# Patient Record
Sex: MALE | Race: WHITE | NOT HISPANIC OR LATINO | Employment: OTHER | URBAN - METROPOLITAN AREA
[De-identification: names, ages, dates, MRNs, and addresses within clinical notes are randomized per-mention and may not be internally consistent; named-entity substitution may affect disease eponyms.]

---

## 2019-05-16 ENCOUNTER — OFFICE VISIT (OUTPATIENT)
Dept: PODIATRY | Facility: CLINIC | Age: 84
End: 2019-05-16
Payer: MEDICARE

## 2019-05-16 VITALS — WEIGHT: 275 LBS | BODY MASS INDEX: 33.49 KG/M2 | OXYGEN SATURATION: 94 % | HEIGHT: 76 IN | HEART RATE: 74 BPM

## 2019-05-16 DIAGNOSIS — B35.1 ONYCHOMYCOSIS DUE TO DERMATOPHYTE: ICD-10-CM

## 2019-05-16 DIAGNOSIS — E11.49 TYPE II DIABETES MELLITUS WITH NEUROLOGICAL MANIFESTATIONS: Primary | ICD-10-CM

## 2019-05-16 PROCEDURE — 1101F PR PT FALLS ASSESS DOC 0-1 FALLS W/OUT INJ PAST YR: ICD-10-PCS | Mod: ,,, | Performed by: PODIATRIST

## 2019-05-16 PROCEDURE — 99213 OFFICE O/P EST LOW 20 MIN: CPT | Mod: ,,, | Performed by: PODIATRIST

## 2019-05-16 PROCEDURE — 1101F PT FALLS ASSESS-DOCD LE1/YR: CPT | Mod: ,,, | Performed by: PODIATRIST

## 2019-05-16 PROCEDURE — 99213 PR OFFICE/OUTPT VISIT, EST, LEVL III, 20-29 MIN: ICD-10-PCS | Mod: ,,, | Performed by: PODIATRIST

## 2019-05-16 RX ORDER — BLOOD SUGAR DIAGNOSTIC
STRIP MISCELLANEOUS
COMMUNITY
Start: 2019-03-09

## 2019-05-16 RX ORDER — PIRFENIDONE 801 MG/1
1 TABLET, COATED ORAL 3 TIMES DAILY
COMMUNITY
Start: 2019-04-19 | End: 2021-12-30 | Stop reason: SDUPTHER

## 2019-05-16 RX ORDER — HUMAN INSULIN 100 [IU]/ML
30 INJECTION, SUSPENSION SUBCUTANEOUS 2 TIMES DAILY
Refills: 5 | COMMUNITY
Start: 2019-05-03

## 2019-05-16 RX ORDER — FUROSEMIDE 40 MG/1
40 TABLET ORAL EVERY OTHER DAY
COMMUNITY
Start: 2019-02-28

## 2019-05-16 RX ORDER — CLOTRIMAZOLE AND BETAMETHASONE DIPROPIONATE 10; .64 MG/G; MG/G
CREAM TOPICAL
Refills: 4 | COMMUNITY
Start: 2019-02-25 | End: 2021-08-09

## 2019-05-16 RX ORDER — LEVOTHYROXINE SODIUM 75 UG/1
TABLET ORAL
Refills: 3 | COMMUNITY
Start: 2019-04-12 | End: 2021-08-09

## 2019-05-16 RX ORDER — BICALUTAMIDE 50 MG/1
TABLET, FILM COATED ORAL
Refills: 3 | COMMUNITY
Start: 2019-02-24 | End: 2021-08-09 | Stop reason: CLARIF

## 2019-05-16 RX ORDER — APIXABAN 5 MG/1
5 TABLET, FILM COATED ORAL 2 TIMES DAILY
Status: ON HOLD | COMMUNITY
Start: 2019-05-14 | End: 2020-09-04 | Stop reason: SDUPTHER

## 2019-05-16 RX ORDER — ESCITALOPRAM OXALATE 10 MG/1
20 TABLET ORAL EVERY MORNING
COMMUNITY
Start: 2019-05-10 | End: 2021-08-09

## 2019-05-16 RX ORDER — METOPROLOL SUCCINATE 50 MG/1
TABLET, EXTENDED RELEASE ORAL
COMMUNITY
Start: 2019-02-28 | End: 2019-09-23

## 2019-05-16 NOTE — PROGRESS NOTES
[address] [facility] [address] [place] [address]  Phone: [phone]   [phone]    Patient's PCP:Primary Doctor No  Referring Provider: No ref. provider found    Subjective:      Chief Complaint:: Toe Pain (bilateral great toenails)    HPI  Manuel Casas is a 86 y.o. male who presents with a complaint of painful great toenails. Current symptoms include painful (when pt can feel it). Treatment to date have included periodic pedicures. Patients rates pain 3/10 on pain scale.    Systemic Doctor: Dr. Michaela Priest  Date Last Seen: 1 month ago  Blood Sugar: 98  Hemoglobin A1c: 6.6 (just about)    Vitals:    05/16/19 1621   Pulse: 74     Shoe Size: 11.5 D/E wide    Past Surgical History:   Procedure Laterality Date    ADENOIDECTOMY      CARDIAC PACEMAKER PLACEMENT      HERNIA REPAIR      left, umbilical    TONSILLECTOMY       Past Medical History:   Diagnosis Date    Cancer     prostate    Congestive heart failure     Diabetes mellitus, type 2     Heart failure     right sided    Hyperlipidemia     Pulmonary fibrosis      Family History   Problem Relation Age of Onset    Heart disease Mother     Diabetes Mother     Hypertension Mother         Social History:   Marital Status:   Alcohol History:  has no alcohol history on file.  Tobacco History:  reports that he has quit smoking. He does not have any smokeless tobacco history on file.  Drug History:  has no drug history on file.    Review of patient's allergies indicates:  No Known Allergies    Current Outpatient Medications   Medication Sig Dispense Refill    ACCU-CHEK SMARTVIEW TEST STRIP Strp       bicalutamide (CASODEX) 50 MG Tab TK 1 T PO QD  3    clotrimazole-betamethasone 1-0.05% (LOTRISONE) cream AIRAM EXT AA BID  4    ELIQUIS 5 mg Tab       ESBRIET 801 mg Tab       escitalopram oxalate (LEXAPRO) 10 MG tablet       furosemide (LASIX) 40 MG tablet       levothyroxine (SYNTHROID) 75 MCG tablet TK 1 T PO  D ON EMPTY STOMACH 1 H  APART FROM FOOD/MEDS  3    metoprolol succinate (TOPROL-XL) 50 MG 24 hr tablet       NOVOLIN N NPH U-100 INSULIN 100 unit/mL injection ADM 20 UNI SC BID  5     No current facility-administered medications for this visit.        Review of Systems      Objective:        Physical Exam:   Foot Exam    General  General Appearance: appears stated age and healthy   Orientation: alert and oriented to person, place, and time   Affect: appropriate   Assistance: cane use       Right Foot/Ankle     Inspection and Palpation  Ecchymosis: none  Tenderness: none   Swelling: (Moderate edema to the lower extremity)  Skin Exam: dry skin; no ulcer and no erythema     Neurovascular  Dorsalis pedis: 1+  Posterior tibial: 1+  Saphenous nerve sensation: diminished  Tibial nerve sensation: diminished  Superficial peroneal nerve sensation: diminished  Deep peroneal nerve sensation: diminished  Sural nerve sensation: diminished    Muscle Strength  Ankle dorsiflexion: 5  Ankle plantar flexion: 5  Ankle inversion: 5  Ankle eversion: 5  Great toe extension: 5  Great toe flexion: 5    Comments  Nails 1 through 5 are thickened, discolored, dystrophic, and elongated with subungual debris      There is slight incurvation of the lateral border of the great toenail. No edema. No erythema. No tenderness to palpation. No purulence.    Left Foot/Ankle      Inspection and Palpation  Ecchymosis: none  Tenderness: none   Swelling: (Moderate edema to the lower extremity)  Skin Exam: dry skin; no ulcer and no erythema     Neurovascular  Dorsalis pedis: 1+  Posterior tibial: absent  Saphenous nerve sensation: diminished  Tibial nerve sensation: diminished  Superficial peroneal nerve sensation: diminished  Deep peroneal nerve sensation: diminished  Sural nerve sensation: diminished    Muscle Strength  Ankle dorsiflexion: 5  Ankle plantar flexion: 5  Ankle inversion: 5  Ankle eversion: 5  Great toe extension: 5  Great toe flexion: 5    Comments  Nails 1  through 5 are thickened, discolored, dystrophic, and elongated with subungual debris      Physical Exam   Cardiovascular:   Pulses:       Dorsalis pedis pulses are 1+ on the right side, and 1+ on the left side.        Posterior tibial pulses are 1+ on the right side, and Absent on the left side.   Feet:   Right Foot:   Skin Integrity: Positive for dry skin. Negative for ulcer or erythema.   Left Foot:   Skin Integrity: Positive for dry skin. Negative for ulcer or erythema.       Imaging: none            Assessment:       1. Type II diabetes mellitus with neurological manifestations    2. Onychomycosis due to dermatophyte      Plan:   Type II diabetes mellitus with neurological manifestations    Onychomycosis due to dermatophyte      Follow up if symptoms worsen or fail to improve.    Procedures - None    Recommend patient continue to get his regular foot care. Explained that if pain returns or if any signs of infection are present to return to clinic for further treatment.    Counseling:     I provided patient education verbally regarding:   Patient diagnosis, treatment options, as well as alternatives, risks, and benefits.     Counseled patient on the aspects of diabetes and how it pertains to the feet.  I explained the importance of proper diabetic foot care and how it is essential for the health of their feet.      Shoe inspection. Patient instructed on proper foot hygeine. We discussed wearing proper shoe gear, daily foot inspections, never walking without protective shoe gear, never putting sharp instruments to feet, routine podiatric nail visits every 2-3 months.      Fungal infection of toenails explained. Treatment options including no treatment, periodic debridement, topical medications, oral medications, and removal of the nail were discussed, as well as success rates and risks of recurrence. We agreed on periodic debridement     This note was created using Dragon voice recognition software that  occasionally misinterpreted phrases or words.

## 2019-05-16 NOTE — PATIENT INSTRUCTIONS
Diabetic Foot Care  Diabetes can lead to a number of foot complications. Fortunately, you can prevent most of these with a little extra foot care. If diabetes is not well controlled, it can cause damage to blood vessels and result in poor circulation to the foot. When the skin does not get enough blood flow, it becomes prone to pressure sores and ulcers, which heal slowly.  Diabetes can also damage nerves, interfering with the ability to feel pain and pressure. When you cant feel your foot normally, it is easy to injure your skin, bones, and joints without knowing it. For these reasons diabetes increases the risk of fungal infections, bunions, and ulcers. An ulcer is a sore or break in the skin. With ulcers, often the skin seems to have worn away. Deep ulcers can lead to bone infection.  Gangrene is the most serious foot complication of diabetes. It usually occurs on the tips of the toes as blackened areas of skin. The black area is dead tissue. In severe cases, gangrene spreads to involve the entire toe, other toes, and the entire foot. Foot or toe amputation may be required. Good foot care and blood sugar control can prevent this.  Home care  · Wear comfortable, well-fitting shoes.  · Wash your feet daily with warm water and mild soap.  · After drying, apply a moisturizing cream or lotion to the top and bottom of your feet. Don't put lotion between toes.  · Check your feet daily for skin breaks, blisters, swelling, or redness. Look between your toes as well. If you cannot see the bottoms of your feet, ask someone to look or use a mirror.  · Wear cotton socks and change them every day.  · Trim toenails carefully, and do not cut your cuticles.  · Strive to keep your blood sugar under control with a combination of medicines, diet, and activity.  · If you smoke and have diabetes, it is very important that you stop. Smoking reduces blood flow to your foot.  · Schedule foot exams at least every year, or more often if  you have foot problems.  · Put your feet up when sitting, wiggle toes, and move ankles to help improve blood flow.  Avoid activities that increase your risk of foot injury:  · Do not walk barefoot.  · Do not use heating pads or hot water bottles on your feet.  · Do not put your foot in a hot tub without first checking the temperature with your hand.  Follow-up care  Follow up with your healthcare provider, or as advised. Be sure to take off your shoes and socks before your appointment starts so your healthcare provider will be sure to check your feet. Report any cut, puncture, scrape, blister, or other injury to your foot. Also report if you have a bunion, hammertoes, ingrown toenail, or ulcer on your foot.  When to seek medical advice  Call your healthcare provider right away if any of these occur:  · Black skin color anywhere on the foot  · Open ulcer with pus draining from the wound  · Increasing foot or leg pain  · New areas of redness or swelling or tender areas of the foot  · Fever of 100.4°F (38°C) or greater  Date Last Reviewed: 5/25/2016  © 6209-2724 [employer]. [address] [place] [address]. All rights reserved. This information is not intended as a substitute for professional medical care. Always follow your healthcare professional's instructions.          Nail Fungal Infection  A nail fungal infection changes the way fingernails and toenails look. They may thicken, discolor, change shape, or split. This condition is hard to treat because nails grow slowly and have limited blood supply. The infection often comes back after treatment.  There are 2 types of medicines used to treat this condition:  · Topical anti-fungal medicines. These are applied to the surface of the skin and nail area. These medicines are not very effective because they cant get deep into the nail.  · Oral antifungal medicines. These medicines work better because they go into the nail from the inside out. But  the infection may still come back. It may take 9 to 12 months for your nail to look normal again. This means you are cured. You can repeat treatment if needed. Most people take these medicines without any problems. It is rare to stop therapy because of side effects. But your healthcare provider may give you some monitoring tests. Talk about possible side effects with your provider before starting treatment.  If medicines fail, the nail can be removed surgically or chemically. These methods physically remove the fungus from the body. This helps medical treatment be more effective.  Home care  · Use medicines exactly as directed for as long as directed. Treating a fungal infection can take longer than other kinds of infections.  · Smoking is a risk factor for fungal infection. This is one more reason to quit.  · Wear absorbent socks, and shoes that let your feet breathe. Sweaty feet increase your risk of fungal infection. They also make an existing infection harder to treat.  · Use footwear when in damp public places like swimming pools, gyms, and shower rooms. This will help you avoid the fungus that grows there.  · Don't share nail clippers or scissors with others.  Follow-up care  Follow up with your healthcare provider, or as advised.  When to seek medical advice  Call your healthcare provider right away if any of these occur:  · Skin by the nail becomes red, swollen, painful, or drains pus (a creamy yellow or white liquid)  · Side effects from oral anti-fungal medicines  Date Last Reviewed: 8/1/2016  © 0844-2172 [employer]. [address] [place] [address], [place] [address]. All rights reserved. This information is not intended as a substitute for professional medical care. Always follow your healthcare professional's instructions.          Treating Peripheral Neuropathy  Peripheral neuropathy is a disease of the nerves. It most often starts in your feet and may also eventually affect the arms. It may cause  pain or may make you unable to sense pain. Sometimes, weakness occurs as well. Lack of pain and weakness makes you more likely to injure yourself without knowing it.  Learn ways to protect your feet. Check your feet daily for wounds you may not have felt. Avoid burns by testing bath water with your elbow before stepping in. Also, always wear shoes to prevent injury.    Regular foot care  If you have foot numbness, you may not notice cutting yourself while trimming your nails. To prevent problems, your doctor may ask you to visit for nail and callus trimming. See your doctor for foot care as often as suggested.  Check your feet daily  Catch problems early by checking your feet every day for changes. Look at the top and bottom of your feet, your heels, and between your toes. It may help to use a mirror. If this is hard, ask someone to check for you. Call your doctor if you notice a wound, ulceration, ingrown nail, or any changes in your feet. This includes increased heat, swelling, and redness.  Wear proper footwear  Always wear shoes and socks, even indoors. Ask your doctor how to choose the right shoe. After buying shoes, bring them to your doctor to be checked for fit. Take new shoes off every hour or so to check for red pressure areas on your feet. Each time you put on your shoes, use your fingers first to feel inside for foreign objects.  Common causes of peripheral neuropathy  Some common causes of peripheral neuropathy include:  · Diabetes or other endocrine disorders  · Toxins (such as alcohol)  · Nutritional deficiencies (such as Vitamin B-12)  · Kidney disease  · Injury  · Repetitive stress (such as carpal tunnel syndrome)  · Autoimmune disease  · Cancer and tumors  · Infection  Diagnosis and treatment  Diagnosis of peripheral neuropathy includes a complete history and physical exam.  Lab tests including blood work and imaging often help determine the cause.  Special nerve tests are often helpful including  nerve conduction velocity studies (NCV), and electromyelography (EMG).  Treatment focuses on treating the underlying disorder and treating symptoms through the use of medicines, injections, TENS (transcutaneous electrical nerve stimulation), acupuncture, massage, and other methods.  Date Last Reviewed: 10/19/2015  © 1932-0609 [employer]. [address] [place] [address], [place] [address]. All rights reserved. This information is not intended as a substitute for professional medical care. Always follow your healthcare professional's instructions.

## 2019-09-23 ENCOUNTER — OFFICE VISIT (OUTPATIENT)
Dept: PODIATRY | Facility: CLINIC | Age: 84
End: 2019-09-23
Payer: MEDICARE

## 2019-09-23 VITALS
WEIGHT: 275 LBS | DIASTOLIC BLOOD PRESSURE: 65 MMHG | HEART RATE: 70 BPM | SYSTOLIC BLOOD PRESSURE: 109 MMHG | HEIGHT: 76 IN | BODY MASS INDEX: 33.49 KG/M2

## 2019-09-23 DIAGNOSIS — B35.1 ONYCHOMYCOSIS DUE TO DERMATOPHYTE: ICD-10-CM

## 2019-09-23 DIAGNOSIS — E11.49 TYPE II DIABETES MELLITUS WITH NEUROLOGICAL MANIFESTATIONS: Primary | ICD-10-CM

## 2019-09-23 PROCEDURE — 99999 PR PBB SHADOW E&M-EST. PATIENT-LVL III: CPT | Mod: PBBFAC,,, | Performed by: PODIATRIST

## 2019-09-23 PROCEDURE — 99999 PR PBB SHADOW E&M-EST. PATIENT-LVL III: ICD-10-PCS | Mod: PBBFAC,,, | Performed by: PODIATRIST

## 2019-09-23 PROCEDURE — 1101F PT FALLS ASSESS-DOCD LE1/YR: CPT | Mod: S$GLB,,, | Performed by: PODIATRIST

## 2019-09-23 PROCEDURE — 99213 OFFICE O/P EST LOW 20 MIN: CPT | Mod: S$GLB,,, | Performed by: PODIATRIST

## 2019-09-23 PROCEDURE — 99213 PR OFFICE/OUTPT VISIT, EST, LEVL III, 20-29 MIN: ICD-10-PCS | Mod: S$GLB,,, | Performed by: PODIATRIST

## 2019-09-23 PROCEDURE — 1101F PR PT FALLS ASSESS DOC 0-1 FALLS W/OUT INJ PAST YR: ICD-10-PCS | Mod: S$GLB,,, | Performed by: PODIATRIST

## 2019-09-23 RX ORDER — PREGABALIN 150 MG/1
CAPSULE ORAL NIGHTLY
COMMUNITY
Start: 2019-05-03 | End: 2021-08-09

## 2019-09-23 RX ORDER — SYRINGE AND NEEDLE,INSULIN,1ML 29 G X1/2"
SYRINGE, EMPTY DISPOSABLE MISCELLANEOUS
COMMUNITY
Start: 2019-09-13

## 2019-09-23 RX ORDER — POTASSIUM CHLORIDE 750 MG/1
10 TABLET, EXTENDED RELEASE ORAL EVERY OTHER DAY
Refills: 11 | COMMUNITY
Start: 2019-07-19

## 2019-09-23 RX ORDER — ALBUTEROL SULFATE 90 UG/1
1-2 AEROSOL, METERED RESPIRATORY (INHALATION)
Refills: 0 | Status: ON HOLD | COMMUNITY
Start: 2019-07-09 | End: 2022-01-26 | Stop reason: CLARIF

## 2019-09-23 NOTE — PROGRESS NOTES
[address] [facility] [address]  [place] [address]  Phone: [phone]   [phone]    Patient's PCP:Primary Doctor No  Referring Provider: No ref. provider found    Subjective:      Chief Complaint:: Ingrown Toenail (left 1st medial border)    HPI  Manuel Casas is a 86 y.o. male who presents with a complaint of possibly ingrown left first medial border lasting for a couple of weeks intermittently and dry skin. Current symptoms include painful when pressure applied.  Aggravating factors are pressure applied. Treatment to date have included topicals for dry skin.     Systemic Doctor: Dr. Michaela Priest  Date Last Seen: about 6 months  Blood Sugar: 92  Hemoglobin A1c: unknown    Vitals:    09/23/19 1147   BP: 109/65   Pulse: 70     Shoe Size: 11.5 wide    Past Surgical History:   Procedure Laterality Date    ADENOIDECTOMY      CARDIAC PACEMAKER PLACEMENT      HERNIA REPAIR      left, umbilical    TONSILLECTOMY       Past Medical History:   Diagnosis Date    Cancer     prostate    Congestive heart failure     Diabetes mellitus, type 2     Heart failure     right sided    Hyperlipidemia     Pulmonary fibrosis      Family History   Problem Relation Age of Onset    Heart disease Mother     Diabetes Mother     Hypertension Mother         Social History:   Marital Status:   Alcohol History:  has no alcohol history on file.  Tobacco History:  reports that he has quit smoking. He does not have any smokeless tobacco history on file.  Drug History:  has no drug history on file.    Review of patient's allergies indicates:  No Known Allergies    Current Outpatient Medications   Medication Sig Dispense Refill    ACCU-CHEK SMARTVIEW TEST STRIP Strp       bicalutamide (CASODEX) 50 MG Tab TK 1 T PO QD  3    clotrimazole-betamethasone 1-0.05% (LOTRISONE) cream AIRAM EXT AA BID  4    ELIQUIS 5 mg Tab 5 mg 2 (two) times daily.       ESBRIET 801 mg Tab 3 (three) times daily.       escitalopram oxalate  "(LEXAPRO) 10 MG tablet 20 mg every morning.       furosemide (LASIX) 40 MG tablet 40 mg every other day.       levothyroxine (SYNTHROID) 75 MCG tablet TK 1 T PO  D ON EMPTY STOMACH 1 H APART FROM FOOD/MEDS  3    NOVOLIN N NPH U-100 INSULIN 100 unit/mL injection 45 Units 2 (two) times daily.   5    potassium chloride (KLOR-CON) 10 MEQ TbSR every other day.   11    pregabalin (LYRICA) 150 MG capsule Take by mouth every evening.       ULTRA COMFORT INSULIN SYRINGE 1 mL 29 gauge x 1/2" Syrg       VENTOLIN HFA 90 mcg/actuation inhaler INL 1 TO 2 PFS PO Q 4 TO 6 H PRN  0     No current facility-administered medications for this visit.        Review of Systems      Objective:        Physical Exam:   Foot Exam    General  General Appearance: appears stated age and healthy   Orientation: alert and oriented to person, place, and time   Affect: appropriate   Assistance: cane use       Right Foot/Ankle     Inspection and Palpation  Ecchymosis: none  Tenderness: none   Swelling: (Moderate edema to the lower extremity)  Skin Exam: dry skin; no ulcer and no erythema     Neurovascular  Dorsalis pedis: 1+  Posterior tibial: 1+  Saphenous nerve sensation: diminished  Tibial nerve sensation: diminished  Superficial peroneal nerve sensation: diminished  Deep peroneal nerve sensation: diminished  Sural nerve sensation: diminished    Muscle Strength  Ankle dorsiflexion: 5  Ankle plantar flexion: 5  Ankle inversion: 5  Ankle eversion: 5  Great toe extension: 5  Great toe flexion: 5    Comments  Nails 1 through 5 are thickened, discolored, dystrophic, and elongated with subungual debris          Left Foot/Ankle      Inspection and Palpation  Ecchymosis: none  Tenderness: none   Swelling: (Moderate edema to the lower extremity)  Skin Exam: dry skin; no ulcer and no erythema     Neurovascular  Dorsalis pedis: 1+  Posterior tibial: absent  Saphenous nerve sensation: diminished  Tibial nerve sensation: diminished  Superficial peroneal " nerve sensation: diminished  Deep peroneal nerve sensation: diminished  Sural nerve sensation: diminished    Muscle Strength  Ankle dorsiflexion: 5  Ankle plantar flexion: 5  Ankle inversion: 5  Ankle eversion: 5  Great toe extension: 5  Great toe flexion: 5    Comments  Nails 1 through 5 are thickened, discolored, dystrophic, and elongated with subungual debris    There is an excess accumulation of subungual debris on the medial aspect of the great toenail.  No signs of ingrowing or infection.    Physical Exam   Cardiovascular:   Pulses:       Dorsalis pedis pulses are 1+ on the right side, and 1+ on the left side.        Posterior tibial pulses are 1+ on the right side, and Absent on the left side.   Feet:   Right Foot:   Skin Integrity: Positive for dry skin. Negative for ulcer or erythema.   Left Foot:   Skin Integrity: Positive for dry skin. Negative for ulcer or erythema.       Imaging: none            Assessment:       1. Type II diabetes mellitus with neurological manifestations    2. Onychomycosis due to dermatophyte      Plan:   Type II diabetes mellitus with neurological manifestations    Onychomycosis due to dermatophyte      Follow up if symptoms worsen or fail to improve.    Procedures - None    Recommend patient continue to get his regular foot care. Explained that if pain returns or if any signs of infection are present to return to clinic for further treatment.    Counseling:     I provided patient education verbally regarding:   Patient diagnosis, treatment options, as well as alternatives, risks, and benefits.     This note was created using Dragon voice recognition software that occasionally misinterpreted phrases or words.

## 2020-08-24 ENCOUNTER — OFFICE VISIT (OUTPATIENT)
Dept: PULMONOLOGY | Facility: CLINIC | Age: 85
End: 2020-08-24
Payer: MEDICARE

## 2020-08-24 DIAGNOSIS — G47.33 OBSTRUCTIVE SLEEP APNEA SYNDROME: ICD-10-CM

## 2020-08-24 DIAGNOSIS — E66.9 OBESITY WITH BODY MASS INDEX 30 OR GREATER: ICD-10-CM

## 2020-08-24 DIAGNOSIS — I48.92 ATRIAL FLUTTER, UNSPECIFIED TYPE: ICD-10-CM

## 2020-08-24 DIAGNOSIS — I50.9 CONGESTIVE HEART FAILURE, UNSPECIFIED HF CHRONICITY, UNSPECIFIED HEART FAILURE TYPE: ICD-10-CM

## 2020-08-24 DIAGNOSIS — R09.02 HYPOXEMIA: ICD-10-CM

## 2020-08-24 DIAGNOSIS — J84.10 FIBROSIS OF LUNG: ICD-10-CM

## 2020-08-24 DIAGNOSIS — Z95.0 PRESENCE OF CARDIAC PACEMAKER: ICD-10-CM

## 2020-08-24 PROBLEM — G47.30 SLEEP APNEA: Status: ACTIVE | Noted: 2020-08-24

## 2020-08-24 PROCEDURE — 99204 OFFICE O/P NEW MOD 45 MIN: CPT | Mod: S$GLB,,, | Performed by: INTERNAL MEDICINE

## 2020-08-24 PROCEDURE — 1159F MED LIST DOCD IN RCRD: CPT | Mod: S$GLB,,, | Performed by: INTERNAL MEDICINE

## 2020-08-24 PROCEDURE — 1159F PR MEDICATION LIST DOCUMENTED IN MEDICAL RECORD: ICD-10-PCS | Mod: S$GLB,,, | Performed by: INTERNAL MEDICINE

## 2020-08-24 PROCEDURE — 99204 PR OFFICE/OUTPT VISIT, NEW, LEVL IV, 45-59 MIN: ICD-10-PCS | Mod: S$GLB,,, | Performed by: INTERNAL MEDICINE

## 2020-08-24 RX ORDER — CELECOXIB 200 MG/1
200 CAPSULE ORAL
COMMUNITY
End: 2021-08-09

## 2020-08-24 RX ORDER — MONTELUKAST SODIUM 10 MG/1
10 TABLET ORAL NIGHTLY
COMMUNITY
End: 2021-08-09

## 2020-08-24 RX ORDER — FAMOTIDINE 20 MG/1
20 TABLET, FILM COATED ORAL 2 TIMES DAILY
Status: ON HOLD | COMMUNITY
End: 2022-01-26 | Stop reason: CLARIF

## 2020-08-24 RX ORDER — PREDNISONE 10 MG/1
TABLET ORAL
Qty: 30 TABLET | Refills: 0 | Status: ON HOLD | OUTPATIENT
Start: 2020-08-24 | End: 2020-09-04 | Stop reason: SDUPTHER

## 2020-08-24 NOTE — ASSESSMENT & PLAN NOTE
· Has ? Recent infection (reviewed CXR but no old films for comparison)  · Still with some increased dyspnea - steroid taper and will see how he does  · RTC 2-3 weeks for recheck  · Continue with ESBRIET  · May need to get repeat studies in 11/2020

## 2020-08-24 NOTE — PROGRESS NOTES
New Office Visit/Consultation Note *    Patient Name: Manuel Casas  MRN: [id number]  : 3/23/1933      Reason for visit: To transfer care, IPF/UIP, sleep apnea, chronic hypoxemia    HPI:     2020 - Pt with h/o IPF/UIP - diagnosed about 3-4 years ago initially on OFEV (had GI side effects), changed to ESBRIET and has no issues with it.  He has been stable (Dr Wade's last note has been reviewed PFT - - TLC -73%, DLCO 80%).   About 2 weeks ago he was sick with respiratory issues - was seen at Urgent Care and was treated with zithromax and feels better now.  He does feel that since stopping zithromax about 1 week ago he has increased SOB, no increased congestion but has had some increased cough.    Past Medical History    Past Medical History:   Diagnosis Date    Atrial flutter     Cancer     prostate    Congestive heart failure     Diabetes mellitus, type 2     Heart failure     right sided    Hyperlipidemia     Pulmonary fibrosis     Sleep apnea        Past Surgical History    Past Surgical History:   Procedure Laterality Date    ADENOIDECTOMY      CARDIAC PACEMAKER PLACEMENT      HERNIA REPAIR      left, umbilical    TONSILLECTOMY         Medications      Current Outpatient Medications:     ACCU-CHEK SMARTVIEW TEST STRIP Strp, , Disp: , Rfl:     bicalutamide (CASODEX) 50 MG Tab, TK 1 T PO QD, Disp: , Rfl: 3    celecoxib (CELEBREX) 200 MG capsule, Take 200 mg by mouth., Disp: , Rfl:     clotrimazole-betamethasone 1-0.05% (LOTRISONE) cream, AIRAM EXT AA BID, Disp: , Rfl: 4    ELIQUIS 5 mg Tab, 5 mg 2 (two) times daily. , Disp: , Rfl:     ESBRIET 801 mg Tab, 3 (three) times daily. , Disp: , Rfl:     escitalopram oxalate (LEXAPRO) 10 MG tablet, 20 mg every morning. , Disp: , Rfl:     famotidine (PEPCID) 20 MG tablet, Take 20 mg by mouth 2 (two) times daily., Disp: , Rfl:     furosemide (LASIX) 40 MG tablet, 40 mg every other day. , Disp: , Rfl:     levothyroxine (SYNTHROID) 75 MCG  "tablet, TK 1 T PO  D ON EMPTY STOMACH 1 H APART FROM FOOD/MEDS, Disp: , Rfl: 3    montelukast (SINGULAIR) 10 mg tablet, Take 10 mg by mouth every evening., Disp: , Rfl:     NOVOLIN N NPH U-100 INSULIN 100 unit/mL injection, 45 Units 2 (two) times daily. , Disp: , Rfl: 5    potassium chloride (KLOR-CON) 10 MEQ TbSR, every other day. , Disp: , Rfl: 11    pregabalin (LYRICA) 150 MG capsule, Take by mouth every evening. , Disp: , Rfl:     ULTRA COMFORT INSULIN SYRINGE 1 mL 29 gauge x 1/2" Syrg, , Disp: , Rfl:     predniSONE (DELTASONE) 10 MG tablet, Take 3 a day x 5 days then 2 a day x 5 days then 1 a day x 5 days, Disp: 30 tablet, Rfl: 0    VENTOLIN HFA 90 mcg/actuation inhaler, INL 1 TO 2 PFS PO Q 4 TO 6 H PRN, Disp: , Rfl: 0    Allergies    Review of patient's allergies indicates:  No Known Allergies    SocHx    Social History     Tobacco Use   Smoking Status Former Smoker   about 30 pack years, quit about 50 years ago    Social History     Substance and Sexual Activity   Alcohol Use None       Drug Use - no  Occupation - retired urologist  Asbestos exposure - no  Pets - cat    FMHx    Family History   Problem Relation Age of Onset    Heart disease Mother     Diabetes Mother     Hypertension Mother          Review of Systems  Review of Systems   Constitutional: Negative for chills, diaphoresis, fever, malaise/fatigue and weight loss.   HENT: Negative for congestion.    Eyes: Negative for pain.   Respiratory: Positive for cough and shortness of breath. Negative for hemoptysis, sputum production, wheezing and stridor.    Cardiovascular: Negative for chest pain, palpitations, orthopnea, claudication, leg swelling and PND.   Gastrointestinal: Negative for abdominal pain, blood in stool, constipation, diarrhea, heartburn, nausea and vomiting.   Genitourinary: Negative for dysuria, frequency, hematuria and urgency.   Musculoskeletal: Negative for back pain, falls, joint pain, myalgias and neck pain.   Skin: " Negative for itching and rash.   Neurological: Negative for dizziness, tingling, tremors, sensory change, speech change, focal weakness, seizures, loss of consciousness, weakness and headaches.   Endo/Heme/Allergies: Negative for environmental allergies.   Psychiatric/Behavioral: Negative for depression, substance abuse and suicidal ideas. The patient is not nervous/anxious.        Physical Exam    Vitals:    08/24/20 0942   BP: (P) 132/76   Pulse: (P) 73   Temp: (P) 97 °F (36.1 °C)   SpO2: (!) (P) 89%   Weight: (P) 130.2 kg (287 lb)       Physical Exam  Vitals signs and nursing note reviewed.   Constitutional:       General: He is not in acute distress.     Appearance: He is well-developed. He is obese. He is not ill-appearing, toxic-appearing or diaphoretic.      Comments: Wearing O2 and mask   HENT:      Head: Normocephalic and atraumatic.      Right Ear: External ear normal.      Left Ear: External ear normal.      Nose: Nose normal.   Eyes:      General: No scleral icterus.        Right eye: No discharge.         Left eye: No discharge.      Extraocular Movements: Extraocular movements intact.      Conjunctiva/sclera: Conjunctivae normal.      Pupils: Pupils are equal, round, and reactive to light.   Neck:      Musculoskeletal: Normal range of motion and neck supple. No neck rigidity or muscular tenderness.      Thyroid: No thyromegaly.      Vascular: No carotid bruit or JVD.      Trachea: No tracheal deviation.   Cardiovascular:      Rate and Rhythm: Normal rate and regular rhythm.      Pulses: Normal pulses.      Heart sounds: Normal heart sounds. No murmur. No friction rub. No gallop.    Pulmonary:      Effort: Pulmonary effort is normal. No respiratory distress.      Breath sounds: No stridor. Rales present. No wheezing or rhonchi.   Chest:      Chest wall: No tenderness.   Abdominal:      General: Bowel sounds are normal. There is no distension.      Palpations: Abdomen is soft.      Tenderness: There is  no abdominal tenderness. There is no guarding.      Comments: obese   Musculoskeletal: Normal range of motion.         General: No swelling, tenderness or deformity.      Right lower leg: Edema (trace) present.      Left lower leg: Edema (trace) present.   Lymphadenopathy:      Cervical: No cervical adenopathy.   Skin:     General: Skin is warm and dry.      Coloration: Skin is not jaundiced.      Findings: No bruising.   Neurological:      General: No focal deficit present.      Mental Status: He is alert and oriented to person, place, and time. Mental status is at baseline.      Cranial Nerves: No cranial nerve deficit.      Deep Tendon Reflexes: Reflexes are normal and symmetric.      Comments: In wheelchair   Psychiatric:         Mood and Affect: Mood normal.         Behavior: Behavior normal.         Thought Content: Thought content normal.         Judgment: Judgment normal.         Labs    No results found for: WBC, HGB, HCT, PLT    No results found for: NA, K, CL, CO2, GLU, BUN, CREATININE, CALCIUM, PROT, ALBUMIN, BILITOT, ALKPHOS, AST, ALT, ANIONGAP    Xrays        Impression/Plan    Problem List Items Addressed This Visit        Pulmonary    Fibrosis of lung     · Has ? Recent infection (reviewed CXR but no old films for comparison)  · Still with some increased dyspnea - steroid taper and will see how he does  · RTC 2-3 weeks for recheck  · Continue with ESBRIET  · May need to get repeat studies in 11/2020           Hypoxemia     · Continue present O2 and reassess as needed            Cardiac/Vascular    Atrial flutter     · By history  · Rate controlled at current visit         Congestive heart failure     · Seems stable  · May need old ECHO reports to evaluate         Presence of cardiac pacemaker     · Aware             Endocrine    Obesity with body mass index 30 or greater     · Could benefit from exercise and weight loss            Other    Sleep apnea     · Has CPAP at home and reports good  compliance               I have spent about 45 minutes with the patient taking the history and examining the patient.  We have discussed the diagnoses and current plan and all questions have been answered.  We have discussed the follow up plan.  The patient and family (if present) know to contact the office with any questions they may have.        Timo Ambrocio MD

## 2020-08-26 ENCOUNTER — HOSPITAL ENCOUNTER (INPATIENT)
Facility: HOSPITAL | Age: 85
LOS: 8 days | Discharge: HOME HEALTH | DRG: 871 | End: 2020-09-04
Attending: EMERGENCY MEDICINE | Admitting: FAMILY MEDICINE
Payer: MEDICARE

## 2020-08-26 DIAGNOSIS — J18.9 PNEUMONIA OF RIGHT LOWER LOBE DUE TO INFECTIOUS ORGANISM: ICD-10-CM

## 2020-08-26 DIAGNOSIS — Z87.09 HISTORY OF PULMONARY FIBROSIS: ICD-10-CM

## 2020-08-26 DIAGNOSIS — E66.9 OBESITY WITH BODY MASS INDEX 30 OR GREATER: ICD-10-CM

## 2020-08-26 DIAGNOSIS — A41.9 SEPSIS, DUE TO UNSPECIFIED ORGANISM, UNSPECIFIED WHETHER ACUTE ORGAN DYSFUNCTION PRESENT: Primary | ICD-10-CM

## 2020-08-26 DIAGNOSIS — J96.21 ACUTE ON CHRONIC RESPIRATORY FAILURE WITH HYPOXIA: ICD-10-CM

## 2020-08-26 DIAGNOSIS — R06.02 SOB (SHORTNESS OF BREATH): ICD-10-CM

## 2020-08-26 DIAGNOSIS — J84.10 PULMONARY FIBROSIS: ICD-10-CM

## 2020-08-26 DIAGNOSIS — Z20.822 SUSPECTED COVID-19 VIRUS INFECTION: ICD-10-CM

## 2020-08-26 DIAGNOSIS — J18.9 PNEUMONIA: ICD-10-CM

## 2020-08-26 DIAGNOSIS — R04.2 HEMOPTYSIS: ICD-10-CM

## 2020-08-26 DIAGNOSIS — R09.02 HYPOXEMIA: ICD-10-CM

## 2020-08-26 DIAGNOSIS — A41.9 SEPSIS: ICD-10-CM

## 2020-08-26 DIAGNOSIS — D69.6 THROMBOCYTOPENIA: ICD-10-CM

## 2020-08-26 DIAGNOSIS — I48.92 ATRIAL FLUTTER, UNSPECIFIED TYPE: ICD-10-CM

## 2020-08-26 DIAGNOSIS — J96.01 ACUTE RESPIRATORY FAILURE WITH HYPOXIA: ICD-10-CM

## 2020-08-26 PROCEDURE — 99291 CRITICAL CARE FIRST HOUR: CPT

## 2020-08-27 ENCOUNTER — CLINICAL SUPPORT (OUTPATIENT)
Dept: CARDIOLOGY | Facility: HOSPITAL | Age: 85
DRG: 871 | End: 2020-08-27
Attending: HOSPITALIST
Payer: MEDICARE

## 2020-08-27 PROBLEM — J18.9 PNEUMONIA: Status: ACTIVE | Noted: 2020-08-27

## 2020-08-27 PROBLEM — E11.9 DIABETES MELLITUS: Status: ACTIVE | Noted: 2020-08-27

## 2020-08-27 PROBLEM — E03.9 HYPOTHYROIDISM: Status: ACTIVE | Noted: 2020-08-27

## 2020-08-27 PROBLEM — D69.6 THROMBOCYTOPENIA: Status: ACTIVE | Noted: 2020-08-27

## 2020-08-27 PROBLEM — J96.21 ACUTE ON CHRONIC RESPIRATORY FAILURE WITH HYPOXIA: Status: ACTIVE | Noted: 2020-08-27

## 2020-08-27 PROBLEM — A41.9 SEPSIS: Status: ACTIVE | Noted: 2020-08-27

## 2020-08-27 LAB
ABO + RH BLD: NORMAL
ALBUMIN SERPL BCP-MCNC: 4.7 G/DL (ref 3.5–5.2)
ALLENS TEST: ABNORMAL
ALLENS TEST: ABNORMAL
ALP SERPL-CCNC: 80 U/L (ref 55–135)
ALT SERPL W/O P-5'-P-CCNC: 39 U/L (ref 10–44)
ANION GAP SERPL CALC-SCNC: 18 MMOL/L (ref 8–16)
APTT PPP: 23.8 SEC (ref 23.6–33.3)
AST SERPL-CCNC: 33 U/L (ref 10–40)
BASOPHILS NFR BLD: 0 % (ref 0–1.9)
BILIRUB SERPL-MCNC: 1.4 MG/DL (ref 0.1–1)
BILIRUB UR QL STRIP: NEGATIVE
BLD GP AB SCN CELLS X3 SERPL QL: NORMAL
BNP SERPL-MCNC: 134 PG/ML (ref 0–99)
BUN SERPL-MCNC: 26 MG/DL (ref 8–23)
CALCIUM SERPL-MCNC: 9.5 MG/DL (ref 8.7–10.5)
CHLORIDE SERPL-SCNC: 99 MMOL/L (ref 95–110)
CK SERPL-CCNC: 55 U/L (ref 20–200)
CLARITY UR: CLEAR
CO2 SERPL-SCNC: 23 MMOL/L (ref 23–29)
COLOR UR: YELLOW
CREAT SERPL-MCNC: 1.4 MG/DL (ref 0.5–1.4)
CRP SERPL-MCNC: 0.25 MG/DL
DELSYS: ABNORMAL
DELSYS: ABNORMAL
DIFFERENTIAL METHOD: ABNORMAL
EOSINOPHIL NFR BLD: 0 % (ref 0–8)
EP: 8
ERYTHROCYTE [DISTWIDTH] IN BLOOD BY AUTOMATED COUNT: 13.6 % (ref 11.5–14.5)
ERYTHROCYTE [SEDIMENTATION RATE] IN BLOOD BY WESTERGREN METHOD: 18 MM/H
EST. GFR  (AFRICAN AMERICAN): 51.9 ML/MIN/1.73 M^2
EST. GFR  (NON AFRICAN AMERICAN): 44.9 ML/MIN/1.73 M^2
FERRITIN SERPL-MCNC: 364 NG/ML (ref 20–300)
FIO2: 95
GLUCOSE SERPL-MCNC: 155 MG/DL (ref 70–110)
GLUCOSE SERPL-MCNC: 175 MG/DL (ref 70–110)
GLUCOSE SERPL-MCNC: 249 MG/DL (ref 70–110)
GLUCOSE SERPL-MCNC: 275 MG/DL (ref 70–110)
GLUCOSE SERPL-MCNC: 297 MG/DL (ref 70–110)
GLUCOSE UR QL STRIP: NEGATIVE
HCO3 UR-SCNC: 18.8 MMOL/L (ref 24–28)
HCO3 UR-SCNC: 22.2 MMOL/L (ref 24–28)
HCT VFR BLD AUTO: 51.9 % (ref 40–54)
HCT VFR BLD CALC: 52 %PCV (ref 36–54)
HGB BLD-MCNC: 17.5 G/DL (ref 14–18)
HGB UR QL STRIP: ABNORMAL
IMM GRANULOCYTES # BLD AUTO: ABNORMAL K/UL (ref 0–0.04)
IMM GRANULOCYTES NFR BLD AUTO: ABNORMAL % (ref 0–0.5)
INR PPP: 1.3
IP: 16
KETONES UR QL STRIP: NEGATIVE
LACTATE SERPL-SCNC: 3 MMOL/L (ref 0.5–1.9)
LACTATE SERPL-SCNC: 3.2 MMOL/L (ref 0.5–1.9)
LACTATE SERPL-SCNC: 3.7 MMOL/L (ref 0.5–1.9)
LACTATE SERPL-SCNC: 4.7 MMOL/L (ref 0.5–1.9)
LACTATE SERPL-SCNC: 4.8 MMOL/L (ref 0.5–1.9)
LACTATE SERPL-SCNC: 8 MMOL/L (ref 0.5–1.9)
LDH SERPL L TO P-CCNC: 296 U/L (ref 110–260)
LEUKOCYTE ESTERASE UR QL STRIP: NEGATIVE
LIPASE SERPL-CCNC: 48 U/L (ref 4–60)
LYMPHOCYTES NFR BLD: 68 % (ref 18–48)
MAGNESIUM SERPL-MCNC: 1.6 MG/DL (ref 1.6–2.6)
MCH RBC QN AUTO: 33.5 PG (ref 27–31)
MCHC RBC AUTO-ENTMCNC: 33.7 G/DL (ref 32–36)
MCV RBC AUTO: 99 FL (ref 82–98)
MODE: ABNORMAL
MONOCYTES NFR BLD: 3 % (ref 4–15)
NEUTROPHILS NFR BLD: 29 % (ref 38–73)
NITRITE UR QL STRIP: NEGATIVE
NRBC BLD-RTO: 1 /100 WBC
PCO2 BLDA: 29.1 MMHG (ref 35–45)
PCO2 BLDA: 29.7 MMHG (ref 35–45)
PH SMN: 7.41 [PH] (ref 7.35–7.45)
PH SMN: 7.49 [PH] (ref 7.35–7.45)
PH UR STRIP: 6 [PH] (ref 5–8)
PLATELET # BLD AUTO: 133 K/UL (ref 150–350)
PMV BLD AUTO: 10.8 FL (ref 9.2–12.9)
PO2 BLDA: 59 MMHG (ref 80–100)
PO2 BLDA: 65 MMHG (ref 80–100)
POC BE: -1 MMOL/L
POC BE: -6 MMOL/L
POC IONIZED CALCIUM: 1.19 MMOL/L (ref 1.06–1.42)
POC SATURATED O2: 91 % (ref 95–100)
POC SATURATED O2: 94 % (ref 95–100)
POC TCO2: 20 MMOL/L (ref 23–27)
POC TCO2: 23 MMOL/L (ref 23–27)
POTASSIUM BLD-SCNC: 4.1 MMOL/L (ref 3.5–5.1)
POTASSIUM SERPL-SCNC: 4.1 MMOL/L (ref 3.5–5.1)
PROCALCITONIN SERPL IA-MCNC: 0.05 NG/ML (ref 0–0.5)
PROT SERPL-MCNC: 6.8 G/DL (ref 6–8.4)
PROT UR QL STRIP: NEGATIVE
PROTHROMBIN TIME: 15.2 SEC (ref 10.6–14.8)
RBC # BLD AUTO: 5.23 M/UL (ref 4.6–6.2)
SAMPLE: ABNORMAL
SAMPLE: ABNORMAL
SARS-COV-2 RDRP RESP QL NAA+PROBE: NEGATIVE
SARS-COV-2 RNA RESP QL NAA+PROBE: NOT DETECTED
SITE: ABNORMAL
SITE: ABNORMAL
SODIUM BLD-SCNC: 138 MMOL/L (ref 136–145)
SODIUM SERPL-SCNC: 140 MMOL/L (ref 136–145)
SP GR UR STRIP: 1.01 (ref 1–1.03)
SP02: 89
SPONT RATE: 19
TROPONIN I SERPL DL<=0.01 NG/ML-MCNC: 0.04 NG/ML
URN SPEC COLLECT METH UR: ABNORMAL
UROBILINOGEN UR STRIP-ACNC: NEGATIVE EU/DL
WBC # BLD AUTO: 12.24 K/UL (ref 3.9–12.7)

## 2020-08-27 PROCEDURE — 83605 ASSAY OF LACTIC ACID: CPT | Mod: 91

## 2020-08-27 PROCEDURE — 99291 CRITICAL CARE FIRST HOUR: CPT | Mod: ,,, | Performed by: INTERNAL MEDICINE

## 2020-08-27 PROCEDURE — 25000003 PHARM REV CODE 250: Performed by: HOSPITALIST

## 2020-08-27 PROCEDURE — 63600175 PHARM REV CODE 636 W HCPCS: Performed by: STUDENT IN AN ORGANIZED HEALTH CARE EDUCATION/TRAINING PROGRAM

## 2020-08-27 PROCEDURE — 82550 ASSAY OF CK (CPK): CPT

## 2020-08-27 PROCEDURE — 93306 ECHO (CUPID ONLY): ICD-10-PCS | Mod: 26,,, | Performed by: INTERNAL MEDICINE

## 2020-08-27 PROCEDURE — 82803 BLOOD GASES ANY COMBINATION: CPT

## 2020-08-27 PROCEDURE — 85007 BL SMEAR W/DIFF WBC COUNT: CPT

## 2020-08-27 PROCEDURE — 85610 PROTHROMBIN TIME: CPT

## 2020-08-27 PROCEDURE — 94761 N-INVAS EAR/PLS OXIMETRY MLT: CPT

## 2020-08-27 PROCEDURE — 85014 HEMATOCRIT: CPT

## 2020-08-27 PROCEDURE — 84295 ASSAY OF SERUM SODIUM: CPT

## 2020-08-27 PROCEDURE — 83615 LACTATE (LD) (LDH) ENZYME: CPT

## 2020-08-27 PROCEDURE — 96374 THER/PROPH/DIAG INJ IV PUSH: CPT

## 2020-08-27 PROCEDURE — 25000242 PHARM REV CODE 250 ALT 637 W/ HCPCS: Performed by: STUDENT IN AN ORGANIZED HEALTH CARE EDUCATION/TRAINING PROGRAM

## 2020-08-27 PROCEDURE — 25000003 PHARM REV CODE 250

## 2020-08-27 PROCEDURE — 83605 ASSAY OF LACTIC ACID: CPT

## 2020-08-27 PROCEDURE — 85027 COMPLETE CBC AUTOMATED: CPT

## 2020-08-27 PROCEDURE — 25000003 PHARM REV CODE 250: Performed by: NURSE PRACTITIONER

## 2020-08-27 PROCEDURE — 94660 CPAP INITIATION&MGMT: CPT

## 2020-08-27 PROCEDURE — 82962 GLUCOSE BLOOD TEST: CPT

## 2020-08-27 PROCEDURE — 63600175 PHARM REV CODE 636 W HCPCS: Performed by: NURSE PRACTITIONER

## 2020-08-27 PROCEDURE — 87077 CULTURE AEROBIC IDENTIFY: CPT

## 2020-08-27 PROCEDURE — 36600 WITHDRAWAL OF ARTERIAL BLOOD: CPT

## 2020-08-27 PROCEDURE — 84145 PROCALCITONIN (PCT): CPT

## 2020-08-27 PROCEDURE — 94640 AIRWAY INHALATION TREATMENT: CPT

## 2020-08-27 PROCEDURE — U0003 INFECTIOUS AGENT DETECTION BY NUCLEIC ACID (DNA OR RNA); SEVERE ACUTE RESPIRATORY SYNDROME CORONAVIRUS 2 (SARS-COV-2) (CORONAVIRUS DISEASE [COVID-19]), AMPLIFIED PROBE TECHNIQUE, MAKING USE OF HIGH THROUGHPUT TECHNOLOGIES AS DESCRIBED BY CMS-2020-01-R: HCPCS

## 2020-08-27 PROCEDURE — 99291 PR CRITICAL CARE, E/M 30-74 MINUTES: ICD-10-PCS | Mod: ,,, | Performed by: INTERNAL MEDICINE

## 2020-08-27 PROCEDURE — 87186 SC STD MICRODIL/AGAR DIL: CPT

## 2020-08-27 PROCEDURE — 96375 TX/PRO/DX INJ NEW DRUG ADDON: CPT

## 2020-08-27 PROCEDURE — 82728 ASSAY OF FERRITIN: CPT

## 2020-08-27 PROCEDURE — 93306 TTE W/DOPPLER COMPLETE: CPT | Mod: 26,,, | Performed by: INTERNAL MEDICINE

## 2020-08-27 PROCEDURE — U0002 COVID-19 LAB TEST NON-CDC: HCPCS

## 2020-08-27 PROCEDURE — 25500020 PHARM REV CODE 255: Performed by: EMERGENCY MEDICINE

## 2020-08-27 PROCEDURE — 63600175 PHARM REV CODE 636 W HCPCS: Performed by: EMERGENCY MEDICINE

## 2020-08-27 PROCEDURE — 84132 ASSAY OF SERUM POTASSIUM: CPT

## 2020-08-27 PROCEDURE — 25000003 PHARM REV CODE 250: Performed by: STUDENT IN AN ORGANIZED HEALTH CARE EDUCATION/TRAINING PROGRAM

## 2020-08-27 PROCEDURE — 36415 COLL VENOUS BLD VENIPUNCTURE: CPT

## 2020-08-27 PROCEDURE — 86140 C-REACTIVE PROTEIN: CPT

## 2020-08-27 PROCEDURE — 27100171 HC OXYGEN HIGH FLOW UP TO 24 HOURS

## 2020-08-27 PROCEDURE — 81003 URINALYSIS AUTO W/O SCOPE: CPT

## 2020-08-27 PROCEDURE — 83880 ASSAY OF NATRIURETIC PEPTIDE: CPT

## 2020-08-27 PROCEDURE — 83735 ASSAY OF MAGNESIUM: CPT

## 2020-08-27 PROCEDURE — 80053 COMPREHEN METABOLIC PANEL: CPT

## 2020-08-27 PROCEDURE — 85730 THROMBOPLASTIN TIME PARTIAL: CPT

## 2020-08-27 PROCEDURE — 99900035 HC TECH TIME PER 15 MIN (STAT)

## 2020-08-27 PROCEDURE — 25500020 PHARM REV CODE 255: Performed by: INTERNAL MEDICINE

## 2020-08-27 PROCEDURE — 82330 ASSAY OF CALCIUM: CPT

## 2020-08-27 PROCEDURE — 93005 ELECTROCARDIOGRAM TRACING: CPT | Performed by: INTERNAL MEDICINE

## 2020-08-27 PROCEDURE — 83690 ASSAY OF LIPASE: CPT

## 2020-08-27 PROCEDURE — 87040 BLOOD CULTURE FOR BACTERIA: CPT | Mod: 59

## 2020-08-27 PROCEDURE — 20000000 HC ICU ROOM

## 2020-08-27 PROCEDURE — 86850 RBC ANTIBODY SCREEN: CPT

## 2020-08-27 PROCEDURE — 84484 ASSAY OF TROPONIN QUANT: CPT

## 2020-08-27 RX ORDER — IBUPROFEN 200 MG
16 TABLET ORAL
Status: DISCONTINUED | OUTPATIENT
Start: 2020-08-27 | End: 2020-09-04 | Stop reason: HOSPADM

## 2020-08-27 RX ORDER — SODIUM CHLORIDE, SODIUM LACTATE, POTASSIUM CHLORIDE, CALCIUM CHLORIDE 600; 310; 30; 20 MG/100ML; MG/100ML; MG/100ML; MG/100ML
INJECTION, SOLUTION INTRAVENOUS CONTINUOUS
Status: DISCONTINUED | OUTPATIENT
Start: 2020-08-27 | End: 2020-08-28

## 2020-08-27 RX ORDER — ACETAMINOPHEN 500 MG
1000 TABLET ORAL
Status: COMPLETED | OUTPATIENT
Start: 2020-08-27 | End: 2020-08-27

## 2020-08-27 RX ORDER — MUPIROCIN 20 MG/G
OINTMENT TOPICAL 2 TIMES DAILY
Status: DISCONTINUED | OUTPATIENT
Start: 2020-08-27 | End: 2020-08-28

## 2020-08-27 RX ORDER — LEVOTHYROXINE SODIUM 25 UG/1
75 TABLET ORAL
Status: DISCONTINUED | OUTPATIENT
Start: 2020-08-27 | End: 2020-09-04 | Stop reason: HOSPADM

## 2020-08-27 RX ORDER — GLUCAGON 1 MG
1 KIT INJECTION
Status: DISCONTINUED | OUTPATIENT
Start: 2020-08-27 | End: 2020-09-04 | Stop reason: HOSPADM

## 2020-08-27 RX ORDER — POTASSIUM CHLORIDE 1.5 G/1.58G
40 POWDER, FOR SOLUTION ORAL
Status: DISCONTINUED | OUTPATIENT
Start: 2020-08-27 | End: 2020-08-30

## 2020-08-27 RX ORDER — SODIUM,POTASSIUM PHOSPHATES 280-250MG
2 POWDER IN PACKET (EA) ORAL
Status: DISCONTINUED | OUTPATIENT
Start: 2020-08-27 | End: 2020-09-04 | Stop reason: HOSPADM

## 2020-08-27 RX ORDER — IBUPROFEN 200 MG
24 TABLET ORAL
Status: DISCONTINUED | OUTPATIENT
Start: 2020-08-27 | End: 2020-09-04 | Stop reason: HOSPADM

## 2020-08-27 RX ORDER — CHLORHEXIDINE GLUCONATE ORAL RINSE 1.2 MG/ML
15 SOLUTION DENTAL 2 TIMES DAILY
Status: DISCONTINUED | OUTPATIENT
Start: 2020-08-27 | End: 2020-08-28

## 2020-08-27 RX ORDER — SODIUM CHLORIDE 0.9 % (FLUSH) 0.9 %
10 SYRINGE (ML) INJECTION
Status: DISCONTINUED | OUTPATIENT
Start: 2020-08-27 | End: 2020-09-04 | Stop reason: HOSPADM

## 2020-08-27 RX ORDER — INSULIN ASPART 100 [IU]/ML
0-5 INJECTION, SOLUTION INTRAVENOUS; SUBCUTANEOUS
Status: DISCONTINUED | OUTPATIENT
Start: 2020-08-27 | End: 2020-09-04 | Stop reason: HOSPADM

## 2020-08-27 RX ORDER — LANOLIN ALCOHOL/MO/W.PET/CERES
800 CREAM (GRAM) TOPICAL
Status: DISCONTINUED | OUTPATIENT
Start: 2020-08-27 | End: 2020-09-04 | Stop reason: HOSPADM

## 2020-08-27 RX ORDER — BISACODYL 10 MG
10 SUPPOSITORY, RECTAL RECTAL DAILY PRN
Status: DISCONTINUED | OUTPATIENT
Start: 2020-08-27 | End: 2020-09-04 | Stop reason: HOSPADM

## 2020-08-27 RX ORDER — SODIUM CHLORIDE, SODIUM LACTATE, POTASSIUM CHLORIDE, CALCIUM CHLORIDE 600; 310; 30; 20 MG/100ML; MG/100ML; MG/100ML; MG/100ML
1000 INJECTION, SOLUTION INTRAVENOUS
Status: DISCONTINUED | OUTPATIENT
Start: 2020-08-27 | End: 2020-08-27

## 2020-08-27 RX ORDER — FAMOTIDINE 20 MG/1
20 TABLET, FILM COATED ORAL 2 TIMES DAILY
Status: DISCONTINUED | OUTPATIENT
Start: 2020-08-27 | End: 2020-09-04 | Stop reason: HOSPADM

## 2020-08-27 RX ORDER — PIRFENIDONE 801 MG/1
801 TABLET, FILM COATED ORAL 3 TIMES DAILY
Status: DISCONTINUED | OUTPATIENT
Start: 2020-08-27 | End: 2020-09-04 | Stop reason: HOSPADM

## 2020-08-27 RX ORDER — DOXYCYCLINE 100 MG/1
100 CAPSULE ORAL EVERY 12 HOURS
Status: DISCONTINUED | OUTPATIENT
Start: 2020-08-27 | End: 2020-08-30

## 2020-08-27 RX ORDER — VANCOMYCIN HCL IN 5 % DEXTROSE 1G/250ML
2000 PLASTIC BAG, INJECTION (ML) INTRAVENOUS ONCE
Status: COMPLETED | OUTPATIENT
Start: 2020-08-27 | End: 2020-08-27

## 2020-08-27 RX ORDER — IPRATROPIUM BROMIDE AND ALBUTEROL SULFATE 2.5; .5 MG/3ML; MG/3ML
3 SOLUTION RESPIRATORY (INHALATION)
Status: COMPLETED | OUTPATIENT
Start: 2020-08-27 | End: 2020-08-27

## 2020-08-27 RX ORDER — CLOTRIMAZOLE AND BETAMETHASONE DIPROPIONATE 10; .64 MG/G; MG/G
CREAM TOPICAL 2 TIMES DAILY
Status: DISCONTINUED | OUTPATIENT
Start: 2020-08-27 | End: 2020-09-04 | Stop reason: HOSPADM

## 2020-08-27 RX ORDER — BICALUTAMIDE 50 MG/1
50 TABLET, FILM COATED ORAL DAILY
Status: DISCONTINUED | OUTPATIENT
Start: 2020-08-27 | End: 2020-09-04 | Stop reason: HOSPADM

## 2020-08-27 RX ORDER — METHYLPREDNISOLONE SOD SUCC 125 MG
125 VIAL (EA) INJECTION
Status: COMPLETED | OUTPATIENT
Start: 2020-08-27 | End: 2020-08-27

## 2020-08-27 RX ORDER — CEFEPIME HYDROCHLORIDE 1 G/50ML
2 INJECTION, SOLUTION INTRAVENOUS
Status: DISCONTINUED | OUTPATIENT
Start: 2020-08-27 | End: 2020-08-30

## 2020-08-27 RX ORDER — MONTELUKAST SODIUM 10 MG/1
10 TABLET ORAL NIGHTLY
Status: DISCONTINUED | OUTPATIENT
Start: 2020-08-27 | End: 2020-09-04 | Stop reason: HOSPADM

## 2020-08-27 RX ORDER — ALBUTEROL SULFATE 90 UG/1
2 AEROSOL, METERED RESPIRATORY (INHALATION) EVERY 4 HOURS PRN
Status: DISCONTINUED | OUTPATIENT
Start: 2020-08-27 | End: 2020-09-04 | Stop reason: HOSPADM

## 2020-08-27 RX ORDER — ACETAMINOPHEN 325 MG/1
650 TABLET ORAL EVERY 4 HOURS PRN
Status: DISCONTINUED | OUTPATIENT
Start: 2020-08-27 | End: 2020-09-04 | Stop reason: HOSPADM

## 2020-08-27 RX ORDER — ESCITALOPRAM OXALATE 10 MG/1
20 TABLET ORAL DAILY
Status: DISCONTINUED | OUTPATIENT
Start: 2020-08-27 | End: 2020-08-30

## 2020-08-27 RX ORDER — PREGABALIN 75 MG/1
150 CAPSULE ORAL NIGHTLY
Status: DISCONTINUED | OUTPATIENT
Start: 2020-08-27 | End: 2020-09-04 | Stop reason: HOSPADM

## 2020-08-27 RX ORDER — ACETAMINOPHEN 325 MG/1
650 TABLET ORAL EVERY 8 HOURS PRN
Status: DISCONTINUED | OUTPATIENT
Start: 2020-08-27 | End: 2020-09-04 | Stop reason: HOSPADM

## 2020-08-27 RX ORDER — ONDANSETRON 2 MG/ML
4 INJECTION INTRAMUSCULAR; INTRAVENOUS EVERY 8 HOURS PRN
Status: DISCONTINUED | OUTPATIENT
Start: 2020-08-27 | End: 2020-09-04 | Stop reason: HOSPADM

## 2020-08-27 RX ORDER — PREDNISONE 20 MG/1
40 TABLET ORAL DAILY
Status: DISCONTINUED | OUTPATIENT
Start: 2020-08-27 | End: 2020-08-31

## 2020-08-27 RX ADMIN — PIRFENIDONE 801 MG: 801 TABLET, FILM COATED ORAL at 08:08

## 2020-08-27 RX ADMIN — SODIUM CHLORIDE, SODIUM LACTATE, POTASSIUM CHLORIDE, AND CALCIUM CHLORIDE: .6; .31; .03; .02 INJECTION, SOLUTION INTRAVENOUS at 05:08

## 2020-08-27 RX ADMIN — PREGABALIN 150 MG: 75 CAPSULE ORAL at 08:08

## 2020-08-27 RX ADMIN — APIXABAN 2.5 MG: 2.5 TABLET, FILM COATED ORAL at 08:08

## 2020-08-27 RX ADMIN — SODIUM CHLORIDE, SODIUM LACTATE, POTASSIUM CHLORIDE, AND CALCIUM CHLORIDE: .6; .31; .03; .02 INJECTION, SOLUTION INTRAVENOUS at 08:08

## 2020-08-27 RX ADMIN — FAMOTIDINE 20 MG: 20 TABLET ORAL at 10:08

## 2020-08-27 RX ADMIN — CLOTRIMAZOLE AND BETAMETHASONE DIPROPIONATE: 10; .5 CREAM TOPICAL at 10:08

## 2020-08-27 RX ADMIN — APIXABAN 2.5 MG: 2.5 TABLET, FILM COATED ORAL at 10:08

## 2020-08-27 RX ADMIN — PIRFENIDONE 801 MG: 801 TABLET, FILM COATED ORAL at 02:08

## 2020-08-27 RX ADMIN — PERFLUTREN 0.4 ML: 6.52 INJECTION, SUSPENSION INTRAVENOUS at 02:08

## 2020-08-27 RX ADMIN — LEVOTHYROXINE SODIUM 75 MCG: 25 TABLET ORAL at 10:08

## 2020-08-27 RX ADMIN — MUPIROCIN: 20 OINTMENT TOPICAL at 10:08

## 2020-08-27 RX ADMIN — CHLORHEXIDINE GLUCONATE 15 ML: 1.2 RINSE ORAL at 08:08

## 2020-08-27 RX ADMIN — INSULIN ASPART 3 UNITS: 100 INJECTION, SOLUTION INTRAVENOUS; SUBCUTANEOUS at 05:08

## 2020-08-27 RX ADMIN — METHYLPREDNISOLONE SODIUM SUCCINATE 125 MG: 125 INJECTION, POWDER, FOR SOLUTION INTRAMUSCULAR; INTRAVENOUS at 12:08

## 2020-08-27 RX ADMIN — IPRATROPIUM BROMIDE AND ALBUTEROL SULFATE 3 ML: .5; 3 SOLUTION RESPIRATORY (INHALATION) at 01:08

## 2020-08-27 RX ADMIN — ESCITALOPRAM OXALATE 20 MG: 10 TABLET ORAL at 10:08

## 2020-08-27 RX ADMIN — FAMOTIDINE 20 MG: 20 TABLET ORAL at 08:08

## 2020-08-27 RX ADMIN — CHLORHEXIDINE GLUCONATE 15 ML: 1.2 RINSE ORAL at 10:08

## 2020-08-27 RX ADMIN — IOHEXOL 100 ML: 350 INJECTION, SOLUTION INTRAVENOUS at 02:08

## 2020-08-27 RX ADMIN — CLOTRIMAZOLE AND BETAMETHASONE DIPROPIONATE: 10; .5 CREAM TOPICAL at 08:08

## 2020-08-27 RX ADMIN — SODIUM CHLORIDE, SODIUM LACTATE, POTASSIUM CHLORIDE, AND CALCIUM CHLORIDE: .6; .31; .03; .02 INJECTION, SOLUTION INTRAVENOUS at 02:08

## 2020-08-27 RX ADMIN — DOXYCYCLINE HYCLATE 100 MG: 100 CAPSULE ORAL at 10:08

## 2020-08-27 RX ADMIN — DOXYCYCLINE HYCLATE 100 MG: 100 CAPSULE ORAL at 08:08

## 2020-08-27 RX ADMIN — HUMAN INSULIN 45 UNITS: 100 INJECTION, SUSPENSION SUBCUTANEOUS at 10:08

## 2020-08-27 RX ADMIN — CEFEPIME HYDROCHLORIDE 2 G: 2 INJECTION, SOLUTION INTRAVENOUS at 12:08

## 2020-08-27 RX ADMIN — HUMAN INSULIN 45 UNITS: 100 INJECTION, SUSPENSION SUBCUTANEOUS at 09:08

## 2020-08-27 RX ADMIN — INSULIN ASPART 3 UNITS: 100 INJECTION, SOLUTION INTRAVENOUS; SUBCUTANEOUS at 12:08

## 2020-08-27 RX ADMIN — ACETAMINOPHEN 1000 MG: 500 TABLET, FILM COATED ORAL at 12:08

## 2020-08-27 RX ADMIN — PREDNISONE 40 MG: 20 TABLET ORAL at 10:08

## 2020-08-27 RX ADMIN — VANCOMYCIN HYDROCHLORIDE 2000 MG: 1 INJECTION, POWDER, LYOPHILIZED, FOR SOLUTION INTRAVENOUS at 03:08

## 2020-08-27 RX ADMIN — MONTELUKAST SODIUM 10 MG: 10 TABLET, COATED ORAL at 08:08

## 2020-08-27 RX ADMIN — CEFEPIME HYDROCHLORIDE 2 G: 2 INJECTION, SOLUTION INTRAVENOUS at 01:08

## 2020-08-27 RX ADMIN — MUPIROCIN: 20 OINTMENT TOPICAL at 08:08

## 2020-08-27 NOTE — PLAN OF CARE
Problem: Oral Intake Inadequate  Goal: Improved Oral Intake  Outcome: Ongoing, Progressing  Intervention: Promote and Optimize Oral Intake  Flowsheets (Taken 8/27/2020 8106)  Oral Nutrition Promotion: (Recommend diet be initiated as medically able. Will monitor NPO status and ability to feed.) other (see comments)

## 2020-08-27 NOTE — ASSESSMENT & PLAN NOTE
In the background of pulmonary fibrosis and chronic hypoxic respiratory failure  Continue Esbriet, his wife will bring medication from home  Currently on BiPAP, titrate as needed  IV solumedrol given in the ED  Prednisone  40 mg PO daily  Follow blood cultures  Consult pulmonology

## 2020-08-27 NOTE — PROGRESS NOTES
Pharmacokinetic Initial Assessment: IV Vancomycin    Assessment/Plan:    Initiate intravenous vancomycin with loading dose of 2000 mg once followed by a maintenance dose of vancomycin 2000 mg IV every 24 hours  Desired empiric serum trough concentration is 10 to 15 mcg/mL  Draw vancomycin trough level 60 min prior to fourth dose on 8/30 at approximately 0200.  Pharmacy will continue to follow and monitor vancomycin.      Please contact pharmacy at extension [phone] with any questions regarding this assessment.     Thank you for the consult,   Modesto Morillo       Patient brief summary:  Manuel Casas is a 87 y.o. male initiated on antimicrobial therapy with IV Vancomycin for treatment of suspected  sepsis, pneumonia    Drug Allergies:   Review of patient's allergies indicates:  No Known Allergies    Actual Body Weight:   131.2 kg    Renal Function:   Estimated Creatinine Clearance: 55 mL/min (based on SCr of 1.4 mg/dL).,     Dialysis Method (if applicable):  N/A    CBC (last 72 hours):  Recent Labs   Lab Result Units 08/27/20  0024   WBC K/uL 12.24   Hemoglobin g/dL 17.5   Hematocrit % 51.9   Platelets K/uL 133*   Gran% % 29.0*   Lymph% % 68.0*   Mono% % 3.0*   Eosinophil% % 0.0   Basophil% % 0.0   Differential Method  Manual       Metabolic Panel (last 72 hours):  Recent Labs   Lab Result Units 08/27/20  0024 08/27/20  0235   Sodium mmol/L 140  --    Potassium mmol/L 4.1  --    Chloride mmol/L 99  --    CO2 mmol/L 23  --    Glucose mg/dL 175*  --    Glucose, UA   --  Negative   BUN, Bld mg/dL 26*  --    Creatinine mg/dL 1.4  --    Albumin g/dL 4.7  --    Total Bilirubin mg/dL 1.4*  --    Alkaline Phosphatase U/L 80  --    AST U/L 33  --    ALT U/L 39  --    Magnesium mg/dL 1.6  --        Drug levels (last 3 results):  No results for input(s): VANCOMYCINRA, VANCOMYCINPE, VANCOMYCINTR in the last 72 hours.    Microbiologic Results:  Microbiology Results (last 7 days)       Procedure Component Value Units Date/Time     Blood culture x two cultures. Draw prior to antibiotics. [[id number]] Collected: 08/27/20 0026    Order Status: Completed Specimen: Blood from Peripheral, Forearm, Right Updated: 08/27/20 0717     Blood Culture, Routine No Growth to date    Narrative:      Aerobic and anaerobic    Blood culture x two cultures. Draw prior to antibiotics. [[id number]] Collected: 08/27/20 0030    Order Status: Completed Specimen: Blood from Peripheral, Forearm, Left Updated: 08/27/20 0717     Blood Culture, Routine No Growth to date    Narrative:      Aerobic and anaerobic

## 2020-08-27 NOTE — ASSESSMENT & PLAN NOTE
Monitor blood glucose  Continue basal insulin  Low-dose sliding scale insulin  Hypoglycemic measures as needed

## 2020-08-27 NOTE — ASSESSMENT & PLAN NOTE
As evidenced by fever, tachypneic, leukocytosis, right lower lobe infiltrates  Initial lactic acid 8.0, repeat lactic acid 4.8  COVID-19 screen negative  IV infusion at 130 mL/hr, IV bolus not given due to questionable history of heart failure and reports of edema and mely gain  IV cefepime and IV vancomycin start in the ED  Repeat lactic acid in 4 hours  NPO  Consult intensivist

## 2020-08-27 NOTE — PROGRESS NOTES
"Pulmonary/Critical Care Consult      Patient name: Manuel Casas  MRN: [id number]  Date: 08/27/2020    Admit Date: 8/26/2020  Consult Requested By: Sheila Addison MD    Reason for Consult: REspiratory failure, pneumonia, pulm fibrosis    HPI:    8/27/2020 - 88 yo male who I met 2 days ago has h/o pulmonary fibrosis (fairly mild but progressive, on ESBRIET, home O2) had recent respiratory illness treated at Urgent Care and when I saw him he was stablle but with some increased dyspnea and I placed him on a short prednisone taper.  Yesterday during the day he felt well, SOB was better and he was active.  That evening he had an episode of chills and rigors and coughed up some bloody sputum.  He then began to develop fever and came to ER.  Covid test was negative and xrays show a RLL infiltrate c/w CAP.  He has needed BiPAP with 100% O2 and is now admitted for further treatment.  He states that he would prefer to not be intubated unless "absolutely necessary".  ROS as below.  He has not had any corona virus exposures and has been practicing social distancing.    Review of Systems    Review of Systems   Constitutional: Positive for chills, fever and malaise/fatigue. Negative for diaphoresis and weight loss.   HENT: Negative for congestion, nosebleeds and sinus pain.    Eyes: Negative for pain.   Respiratory: Positive for cough, hemoptysis, sputum production and shortness of breath. Negative for wheezing and stridor.    Cardiovascular: Positive for leg swelling (chronic and not worse). Negative for chest pain, palpitations, orthopnea, claudication and PND.   Gastrointestinal: Positive for diarrhea. Negative for abdominal pain, blood in stool, constipation, heartburn, nausea and vomiting.   Genitourinary: Negative for dysuria, frequency, hematuria and urgency.   Musculoskeletal: Negative for back pain, falls, joint pain, myalgias and neck pain.   Skin: Negative for itching and rash.   Neurological: Positive for weakness " (some generalized). Negative for dizziness, tingling, tremors, sensory change, speech change, focal weakness, seizures, loss of consciousness and headaches.   Psychiatric/Behavioral: Negative for depression, substance abuse and suicidal ideas. The patient is not nervous/anxious.        Past Medical History    Past Medical History:   Diagnosis Date    Atrial flutter     Cancer     prostate    Congestive heart failure     Diabetes mellitus, type 2     Heart failure     right sided    Hyperlipidemia     Pulmonary fibrosis     Sleep apnea        Past Surgical History    Past Surgical History:   Procedure Laterality Date    ADENOIDECTOMY      CARDIAC PACEMAKER PLACEMENT      HERNIA REPAIR      left, umbilical    TONSILLECTOMY         Medications (scheduled):      bicalutamide  50 mg Oral Daily    ceFEPime (MAXIPIME) IVPB  2 g Intravenous Q12H    clotrimazole-betamethasone 1-0.05%   Topical (Top) BID    escitalopram oxalate  20 mg Oral Daily    famotidine  20 mg Oral BID    insulin NPH  45 Units Subcutaneous BID    levothyroxine  75 mcg Oral Before breakfast    montelukast  10 mg Oral QHS    pirfenidone   Oral TID    predniSONE  40 mg Oral Daily    pregabalin  150 mg Oral QHS       Medications (infusions):      lactated ringers 130 mL/hr at 08/27/20 0834       Medications (prn):     acetaminophen, acetaminophen, albuterol, bisacodyL, dextrose 50%, dextrose 50%, glucagon (human recombinant), glucose, glucose, insulin aspart U-100, magnesium oxide, magnesium oxide, ondansetron, potassium chloride, potassium chloride, potassium chloride, potassium, sodium phosphates, potassium, sodium phosphates, potassium, sodium phosphates, promethazine (PHENERGAN) IVPB, sodium chloride 0.9%, Pharmacy to dose Vancomycin consult **AND** vancomycin - pharmacy to dose    Family History:   Family History   Problem Relation Age of Onset    Heart disease Mother     Diabetes Mother     Hypertension Mother        Social  "History: Tobacco:   Social History     Tobacco Use   Smoking Status Former Smoker                                EtOH:   Social History     Substance and Sexual Activity   Alcohol Use None                                Drugs:   Social History     Substance and Sexual Activity   Drug Use Not on file                                Occupation: retired urologist                             Asbestos exposure: no    Physical Exam    Vital signs:  Temp:  [98.9 °F (37.2 °C)-104.9 °F (40.5 °C)]   Pulse:  [69-70]   Resp:  [17-28]   BP: (101-147)/(53-70)   SpO2:  [89 %-97 %]     Intake/Output: No intake or output data in the 24 hours ending 08/27/20 0848     BMI: Estimated body mass index is 34.93 kg/m² as calculated from the following:    Height as of this encounter: 6' 4" (1.93 m).    Weight as of this encounter: 130.2 kg (287 lb).    Physical Exam   Constitutional: He is oriented to person, place, and time. No distress.   Obese male, wearing BiPAP   HENT:   Head: Normocephalic and atraumatic.   Right Ear: External ear normal.   Left Ear: External ear normal.   Mouth/Throat: Oropharynx is clear and moist.   Wearing BiPAP   Eyes: Pupils are equal, round, and reactive to light. Conjunctivae are normal. Right eye exhibits no discharge. Left eye exhibits no discharge. No scleral icterus.   Neck: Normal range of motion. Neck supple. No JVD present. No tracheal deviation present. No thyromegaly present.   Cardiovascular: Normal rate, regular rhythm, normal heart sounds and intact distal pulses. Exam reveals no gallop and no friction rub.   No murmur heard.  Pulmonary/Chest: Effort normal. No stridor. No respiratory distress. He has no wheezes. He has rales (few posterior rales).   BiPAP  + bnronchial BS RLL   Abdominal: Soft. Bowel sounds are normal. He exhibits no distension. There is no abdominal tenderness. There is no rebound.   obese   Musculoskeletal: Normal range of motion.         General: Edema present. No tenderness. "   Lymphadenopathy:     He has no cervical adenopathy.   Neurological: He is alert and oriented to person, place, and time. GCS score is 15.   Some generalized weakness   Skin: Skin is warm and dry. No rash noted. He is not diaphoretic. No erythema.   Psychiatric: Mood, memory, affect and judgment normal.   Nursing note and vitals reviewed.      Laboratory    Recent Labs   Lab 08/27/20 0024 08/27/20  0054   WBC 12.24  --    RBC 5.23  --    HGB 17.5  --    HCT 51.9 52   *  --    MCV 99*  --    MCH 33.5*  --    MCHC 33.7  --        Recent Labs   Lab 08/27/20 0024   CALCIUM 9.5   PROT 6.8      K 4.1   CO2 23   CL 99   BUN 26*   CREATININE 1.4   ALKPHOS 80   ALT 39   AST 33   BILITOT 1.4*       Recent Labs   Lab 08/27/20 0024   INR 1.3   APTT 23.8       Recent Labs   Lab 08/27/20 0024   CPK 55   TROPONINI 0.036       Additional labs:     LA - 8 -- 4.8    Procalcitonin - 0.05    Covid - neg    UA - noted    Microbiology:       Microbiology Results (last 7 days)     Procedure Component Value Units Date/Time    Blood culture x two cultures. Draw prior to antibiotics. [[id number]] Collected: 08/27/20 0026    Order Status: Completed Specimen: Blood from Peripheral, Forearm, Right Updated: 08/27/20 0717     Blood Culture, Routine No Growth to date    Narrative:      Aerobic and anaerobic    Blood culture x two cultures. Draw prior to antibiotics. [[id number]] Collected: 08/27/20 0030    Order Status: Completed Specimen: Blood from Peripheral, Forearm, Left Updated: 08/27/20 0717     Blood Culture, Routine No Growth to date    Narrative:      Aerobic and anaerobic          Radiology    Imaging Results          CT Chest Abdomen Pelvis With Contrast (Final result)  Result time 08/27/20 01:26:00    Final result by Zulma Cruz MD (08/27/20 01:26:00)                 Narrative:    EXAM:  CT Chest, Abdomen and Pelvis With Intravenous Contrast    CLINICAL HISTORY:  The patient is 87 years old and is Male;  Sepsis    TECHNIQUE:  Axial computed tomography images of the chest, abdomen and pelvis with intravenous contrast.  Sagittal and coronal reformatted images were created and reviewed.  This CT exam was performed using one or more of the following dose reduction techniques:  automated exposure control, adjustment of the mA and/or kV according to patient size, and/or use of iterative reconstruction technique.    COMPARISON:  No relevant prior studies available.    FINDINGS:  CHEST:  LUNGS:  Patchy area of groundglass opacity within the right lower lobe with interlobular septal thickening is present. Dependent atelectasis within the lung bases is also noted.  PLEURAL SPACE:  Small moderate left pleural effusion is present.  No pneumothorax.  HEART:  No cardiomegaly.  No pericardial effusion.  MEDIASTINUM:  The tracheobronchial tree is widely patent.    ABDOMEN:  LIVER:  Unremarkable.  No mass.  GALLBLADDER AND BILE DUCTS:  No calcified stones.  No ductal dilation.  PANCREAS:  No ductal dilation.  No mass.  SPLEEN:  Unremarkable.  ADRENALS:  Unremarkable.  No mass.  KIDNEYS AND URETERS:  Unremarkable. The kidneys enhance symmetrically. No obstructing renal or ureteral calculus is seen. No hydronephrosis or hydroureter. No perinephric fluid or stranding.  STOMACH AND BOWEL:  The stomach is well distended with food contents. The small bowel is normal in caliber. Stool is present throughout the colon. Scattered colonic diverticula are noted without surrounding inflammation. There is no bowel obstruction.    PELVIS:  APPENDIX:  No findings to suggest acute appendicitis.  BLADDER:  The bladder is nearly empty.  REPRODUCTIVE:  Unremarkable as visualized.    CHEST, ABDOMEN and PELVIS:  INTRAPERITONEAL SPACE:  Unremarkable.  No significant fluid collection.  No free air.  BONES/JOINTS:  Multilevel degenerative change of the spine is present.  SOFT TISSUES:  Postsurgical change of the left inguinal region is  present.  VASCULATURE:  Atherosclerosis of the vasculature is present.  No aortic aneurysm.  LYMPH NODES:  Shotty mediastinal lymph nodes are present.  TUBES, LINES AND DEVICES:  A dual-lead left-sided pacemaker is present.    IMPRESSION:  1.  Left pleural effusion with associated bibasilar atelectasis and infectious process within the right lower lobe.  2.  Colonic diverticulosis.    Electronically signed by:  Zulma Cruz MD  8/27/2020 3:01 AM CDT Workstation: [id number]                             X-Ray Chest AP Portable (Final result)  Result time 08/27/20 00:39:02    Final result by Eduin Lam MD (08/27/20 00:39:02)                 Narrative:    HISTORY: Fever, cough, hemoptysis, suspected Covid 19 virus infection.    FINDINGS: Portable chest radiograph at 0041 hours compared to  07/15/2015 shows dual lead left subclavian CCD with distal lead tips  unchanged in position in the right atrium and right ventricle. The  cardiomediastinal silhouette and pulmonary vasculature are normal,  with aortic vascular calcifications.    The lungs are symmetrically expanded, with scattered reticulonodular  and groundglass opacities in both lower lungs, right greater than  left. There is no dense consolidation, large pleural effusion, or  evidence of pulmonary edema. No pneumothorax or acute osseous  abnormality.    IMPRESSION: Scattered bilateral lower lung interstitial opacities  right greater than left, which could reflect Covid 19 pneumonia in the  appropriate clinical setting.    Electronically Signed by Eduin SMALLS on 8/27/2020 6:51 AM                              Additional Studies    EKG (8/27)  Vent. Rate : 070 BPM     Atrial Rate : 119 BPM      P-R Int : 000 ms          QRS Dur : 124 ms       QT Int : 402 ms       P-R-T Axes : 000 -88 083 degrees      QTc Int : 434 ms     Ventricular-paced rhythm   Abnormal ECG   No previous ECGs available    Ventilator Information    Oxygen Concentration (%):  [100] 100          Recent Labs     08/27/20  0532   PH 7.409   PCO2 29.7*   PO2 59*   HCO3 18.8*   POCSATURATED 91*   BE -6         Impression    Active Hospital Problems    Diagnosis  POA    *Sepsis [A41.9]  Yes    Pneumonia [J18.9]  Yes    Hypothyroidism [E03.9]  Yes    Diabetes mellitus [E11.9]  Yes    Acute on chronic respiratory failure with hypoxia [J96.21]  Unknown    Thrombocytopenia [D69.6]  Unknown    Atrial flutter [I48.92]  Yes    Presence of cardiac pacemaker [Z95.0]  Yes    Sleep apnea [G47.30]  Yes    Pulmonary fibrosis [J84.10]  Unknown     · UIP on ESBRIET since about 2015  · PFT (11/19) - TLC - 74%, DLCO - 80%      Obesity with body mass index 30 or greater [E66.9]  Yes      Resolved Hospital Problems   No resolved problems to display.       Plan    · Continue antibiotics  · BiPAP as needed - wean O2 as able, vapotherm as tolerated, will try to avoid intubation  · Await culture results  · Continue oral steroids  · Continue ESBRIET  · Continue ELIQUIS And watch for further hemoptysis  · Monitor platelets  · ICU for now    He does not have Covid infection ion my opinion and has a negative rapid screen.  His presentation is most consistent with a community acquired pneumonia with sepsis and respiratory failure.  I do not know why a second Covid test was done in ER.  I will not place him in the Covid unit because I am concerned about the potential risk of exposure for this patient.    Critical Care Time    I have spent > 35 minutes providing critical care services for this pt for the above diagnoses.  These services have included pt evaluation, pt exam, BiPAP/oxygenation assessment, discussions with staff, chart review, data review, note preparation and .  The patient has life threatening illness with a high risk of decompensation and/or death.      Thank you for this consult.  I will follow with you while the patient is hospitalized.  Please call ([phone]) if you have any  questions.    Timo Ambrocio MD

## 2020-08-27 NOTE — ED PROVIDER NOTES
"Encounter Date: 8/26/2020       History     Chief Complaint   Patient presents with    Hemoptysis    Fever    Cough    Shortness of Breath     History difficult d/t patient confusion, hearing impaired, and acuity of situation.    Manuel Casas is a 87 y.o. male w/ hx of pulmonary fibrosis atrial flutter w/ cardiac pacer and on eloquis, pulmonary fibrosis, HLD, DM2 (per chart) who presents for shortness of breath.    Of note, chart says "CHF". His wife denies CHF and says that was what they thought he had prior to dx of pulmonary fibrosis.     At baseline he lives on 4 L of O2 and typically sats at 94%.   He also has intermittent hemoptysis for over a year, but none recently.    On the 12th he went to an urgent care for worsening cough and SOB. He was negative for COVID, but CXR showed right sided pneumonia; he was discharged on azithromycin.     Since then, he has had progressive SOB. Today he had onset of chills today and return of hemoptysis and worsening SOB. EMS was activated. When EMS found him he was satting 94% on 4L but noted to desat when he moved. His O2 was increased to 6L NC.     Again, he is a difficult historian, but tells me he is confused, SOB, and that he has had hemoptysis. He tells me he had diarrhea earlier today. He endorses baseline mild BLLE, no changes. No CP or abdominal pain.         Review of patient's allergies indicates:  No Known Allergies  Past Medical History:   Diagnosis Date    Atrial flutter     Cancer     prostate    Congestive heart failure     Diabetes mellitus, type 2     Heart failure     right sided    Hyperlipidemia     Pulmonary fibrosis     Sleep apnea      Past Surgical History:   Procedure Laterality Date    ADENOIDECTOMY      CARDIAC PACEMAKER PLACEMENT      HERNIA REPAIR      left, umbilical    TONSILLECTOMY       Family History   Problem Relation Age of Onset    Heart disease Mother     Diabetes Mother     Hypertension Mother      Social History "     Tobacco Use    Smoking status: Former Smoker   Substance Use Topics    Alcohol use: Not on file    Drug use: Not on file     Review of Systems   Unable to perform ROS: Mental status change   Constitutional: Positive for activity change and chills.   Respiratory: Positive for cough and shortness of breath.         Hemoptysis   Cardiovascular: Positive for leg swelling (Chronic, unchanged). Negative for chest pain.   Gastrointestinal: Positive for diarrhea. Negative for abdominal pain.   Psychiatric/Behavioral: Positive for confusion.       Physical Exam     Initial Vitals [08/26/20 2359]   BP Pulse Resp Temp SpO2   (!) 147/70 69 (!) 28 (!) 104.9 °F (40.5 °C) (!) 91 %      MAP       --         Physical Exam    Nursing note and vitals reviewed.  Constitutional: He appears well-developed and well-nourished. He is not diaphoretic.   Moderate respiratory distress. Looks very ill, but makes jokes.   HENT:   Head: Normocephalic and atraumatic.   Mouth/Throat: Oropharynx is clear and moist.   Eyes: EOM are normal. Pupils are equal, round, and reactive to light.   Neck: Normal range of motion. Neck supple.   Cardiovascular: Normal rate, normal heart sounds and intact distal pulses.   Pulmonary/Chest: He is in respiratory distress.   Diffusely coarse breath sounds. Satting 84 on RA and then only 86% on 4L NC -> 90% on 9L NC. Tachypnea.    Abdominal: Soft. There is no abdominal tenderness.   Obese, soft, non-tender.   Musculoskeletal: Normal range of motion. Edema (Mild pretibial edema) present.   Neurological: He is alert.   Appears mildly confused, but difficult to assess d/t baseline hearing impaired.   Skin: Capillary refill takes less than 2 seconds.   Skin very warm to touch         ED Course   Critical Care    Date/Time: 8/27/2020 3:41 AM  Performed by: Nuzhat Gutiérrez MD  Authorized by: Nuzhat Gutiérrez MD   Direct patient critical care time: 35 minutes  Additional history critical care time: 5  minutes  Ordering / reviewing critical care time: 5 minutes  Documentation critical care time: 5 minutes  Consulting other physicians critical care time: 5 minutes  Consult with family critical care time: 5 minutes  Total critical care time (exclusive of procedural time) : 60 minutes  Critical care time was exclusive of separately billable procedures and treating other patients and teaching time.  Critical care was necessary to treat or prevent imminent or life-threatening deterioration of the following conditions: respiratory failure and sepsis.  Critical care was time spent personally by me on the following activities: development of treatment plan with patient or surrogate, discussions with consultants, discussions with primary provider, interpretation of cardiac output measurements, evaluation of patient's response to treatment, examination of patient, obtaining history from patient or surrogate, ordering and performing treatments and interventions, ordering and review of laboratory studies, ordering and review of radiographic studies, pulse oximetry, re-evaluation of patient's condition and review of old charts.        Labs Reviewed   CBC W/ AUTO DIFFERENTIAL - Abnormal; Notable for the following components:       Result Value    Mean Corpuscular Volume 99 (*)     Mean Corpuscular Hemoglobin 33.5 (*)     Platelets 133 (*)     nRBC 1 (*)     Gran% 29.0 (*)     Lymph% 68.0 (*)     Mono% 3.0 (*)     All other components within normal limits   COMPREHENSIVE METABOLIC PANEL - Abnormal; Notable for the following components:    Glucose 175 (*)     BUN, Bld 26 (*)     Total Bilirubin 1.4 (*)     Anion Gap 18 (*)     eGFR if  51.9 (*)     eGFR if non  44.9 (*)     All other components within normal limits   FERRITIN - Abnormal; Notable for the following components:    Ferritin 364 (*)     All other components within normal limits   LACTATE DEHYDROGENASE - Abnormal; Notable for the  following components:     (*)     All other components within normal limits   LACTIC ACID, PLASMA - Abnormal; Notable for the following components:    Lactate (Lactic Acid) 8.0 (*)     All other components within normal limits    Narrative:     Waiting For ER nurse to call back.    Lactic Acid critical result(s) repeated. Called and verbal readback   obtained from Joshua Brock Rn/ER by AS2 08/27/2020 01:33   B-TYPE NATRIURETIC PEPTIDE - Abnormal; Notable for the following components:     (*)     All other components within normal limits   PROTIME-INR - Abnormal; Notable for the following components:    PT 15.2 (*)     All other components within normal limits   URINALYSIS, REFLEX TO URINE CULTURE - Abnormal; Notable for the following components:    Occult Blood UA Trace (*)     All other components within normal limits    Narrative:     Specimen Source->Urine   LACTIC ACID, PLASMA - Abnormal; Notable for the following components:    Lactate (Lactic Acid) 4.7 (*)     All other components within normal limits    Narrative:     Lactic Acid  critical result(s) repeated. Called and verbal readback   obtained from Dr Gutiérrez/ER by AS2 08/27/2020 02:24   ISTAT PROCEDURE - Abnormal; Notable for the following components:    POC PH 7.489 (*)     POC PCO2 29.1 (*)     POC PO2 65 (*)     POC HCO3 22.2 (*)     POC SATURATED O2 94 (*)     POC Glucose 155 (*)     All other components within normal limits   CULTURE, BLOOD   CULTURE, BLOOD   SARS-COV-2 RNA AMPLIFICATION, QUAL   C-REACTIVE PROTEIN   CK   TROPONIN I   PROCALCITONIN   APTT   LIPASE   MAGNESIUM   SARS-COV-2 (COVID-19) QUALITATIVE PCR   TYPE & SCREEN     EKG Readings: (Independently Interpreted)   Rhythm: Ventricularly paced  ythm. Heart Rate: 70. ST Segments: Normal ST Segments. Axis: Left Axis Deviation.       Imaging Results          CT Chest Abdomen Pelvis With Contrast (Final result)  Result time 08/27/20 01:26:00    Final result by Zulma Cruz,  MD (08/27/20 01:26:00)                 Narrative:    EXAM:  CT Chest, Abdomen and Pelvis With Intravenous Contrast    CLINICAL HISTORY:  The patient is 87 years old and is Male; Sepsis    TECHNIQUE:  Axial computed tomography images of the chest, abdomen and pelvis with intravenous contrast.  Sagittal and coronal reformatted images were created and reviewed.  This CT exam was performed using one or more of the following dose reduction techniques:  automated exposure control, adjustment of the mA and/or kV according to patient size, and/or use of iterative reconstruction technique.    COMPARISON:  No relevant prior studies available.    FINDINGS:  CHEST:  LUNGS:  Patchy area of groundglass opacity within the right lower lobe with interlobular septal thickening is present. Dependent atelectasis within the lung bases is also noted.  PLEURAL SPACE:  Small moderate left pleural effusion is present.  No pneumothorax.  HEART:  No cardiomegaly.  No pericardial effusion.  MEDIASTINUM:  The tracheobronchial tree is widely patent.    ABDOMEN:  LIVER:  Unremarkable.  No mass.  GALLBLADDER AND BILE DUCTS:  No calcified stones.  No ductal dilation.  PANCREAS:  No ductal dilation.  No mass.  SPLEEN:  Unremarkable.  ADRENALS:  Unremarkable.  No mass.  KIDNEYS AND URETERS:  Unremarkable. The kidneys enhance symmetrically. No obstructing renal or ureteral calculus is seen. No hydronephrosis or hydroureter. No perinephric fluid or stranding.  STOMACH AND BOWEL:  The stomach is well distended with food contents. The small bowel is normal in caliber. Stool is present throughout the colon. Scattered colonic diverticula are noted without surrounding inflammation. There is no bowel obstruction.    PELVIS:  APPENDIX:  No findings to suggest acute appendicitis.  BLADDER:  The bladder is nearly empty.  REPRODUCTIVE:  Unremarkable as visualized.    CHEST, ABDOMEN and PELVIS:  INTRAPERITONEAL SPACE:  Unremarkable.  No significant fluid  "collection.  No free air.  BONES/JOINTS:  Multilevel degenerative change of the spine is present.  SOFT TISSUES:  Postsurgical change of the left inguinal region is present.  VASCULATURE:  Atherosclerosis of the vasculature is present.  No aortic aneurysm.  LYMPH NODES:  Shotty mediastinal lymph nodes are present.  TUBES, LINES AND DEVICES:  A dual-lead left-sided pacemaker is present.    IMPRESSION:  1.  Left pleural effusion with associated bibasilar atelectasis and infectious process within the right lower lobe.  2.  Colonic diverticulosis.    Electronically signed by:  Zulma Cruz MD  8/27/2020 3:01 AM CDT Workstation: [id number]                             X-Ray Chest AP Portable (In process)                  Medical Decision Making:   Initial Assessment:   Arrives febrile, desatting, tachypneic. Hx significant for pulmonary fibrosis; on home O2 of 4-5 LPM and nightly CPAP @ 10. Recently diagnosed w/ "right sided pneumonia" for which he was prescribed azithromycin. Notes that he had return of his chronic intermittent hemoptysis today along w/ chills and worsening SOB.    On exam, he is tachypneic and desatting. Appears somewhat confused, but is also noted to be very hard of hearing. His lungs are diffusely course. BP and HR stable. Has some blood around mouth and bloody spit in a basin, consistent w/ his complaint of hemoptysis.     On initial presentation, most concerned for sepsis / pneumonia. His abdomen is non-tender, no meningismus, and denies urinary symptoms. Regardless, ordered septic workup, pan cultures, and gave broad spectrum ABX w/ vanc and cefepime. Initially placed on HFNC d/t desaturations and gave round of duonebs.     On reassessment, patient was feeling much better.     Labs most notable for elevated lactic acid. (See other interpretations under ED course). Started on IVF. His CXR consistent w/ RLL pneumonia, but d/t such high fever concerned that there could be other source.  CT chest, " abdomen, pelvis ordered.     On subsequent reassessment, patient feeling even better. Prior to going to CT scanner, transitioned to BiPAP not because he needed escalation from a respiratory standpoint, but because at night he uses home CPAP @ 10.     CT showed pneumonia; no other acute pathology. No UTI on UA.    Patient stable for admission. Discussed case w/ hospitalist team and admit ting patient to the MICU for pneumonia, sepsis.     Toby Hatch MD, Emergency Medicine, PGY-4, 3:26 AM 8/27/2020                  Attending Attestation:   Physician Attestation Statement for Resident:  As the supervising MD   Physician Attestation Statement: I have personally seen and examined this patient.   I agree with the above history. -:   As the supervising MD I agree with the above PE.    As the supervising MD I agree with the above treatment, course, plan, and disposition.   -:   I saw and examined the patient.  I have reviewed and agree with the resident's findings, including all diagnostic interpretations and plans as written.  I was present for the key portions of the separately billed procedures.    87-year-old male with pulmonary fibrosis with chronic respiratory failure presents the emergency department via EMS with hypoxia, fever and cough productive of bloody sputum.  Hypoxia improved on BiPAP.  Fever improved with Tylenol.  Patient has improved dramatically from a clinical standpoint.  His workup is remarkable for a markedly elevated lactic as well as a white blood cell count of 12.  His source seems to be a right lower lobe pneumonia.  He has severe sepsis but no signs of shock at this time.  Given his unclear history of congestive heart failure and concomitant respiratory status, he will be placed on a IV fluid rate of 130 per hour for 30 hours to meet sepsis guidelines.  He was covered with broad-spectrum antibiotics. Rapid COVID negative.  He will be admitted for further management of his sepsis and acute  on chronic respiratory failure secondary to his right lower lobe pneumonia.    Nuzhat Gutiérrez MD  Emergency Medicine  08/27/2020 3:45 AM    I have reviewed and agree with the residents interpretation of the following: lab data, x-rays, EKG and CT scans.  I have reviewed the following: old records at this facility.                    ED Course as of Aug 27 0341   Thu Aug 27, 2020   0043 Looking more comfortable on high flow NC at 25 L and 100%. Satting 92%. Obtaining ABG.     [NS]   0103 ABG notable for PaO2 65 on 100% FiO2. No acidosis. Respiratory alkalosis as expected 2/2 tachypnea. Ordering duonebs.     [NS]   0115 SARS-CoV-2 RNA, Amplification, Qual: Negative [NS]   0115 Procalcitonin: 0.05 [NS]   0115 BNP(!): 134 [NS]   0115 LD(!): 296 [NS]   0115 CRP: 0.25 [NS]   0116 CO2: 23 [NS]   0116 Anion Gap(!): 18 [NS]   0116 WBC: 12.24 [NS]   0116 Hemoglobin: 17.5 [NS]   0116 Hematocrit: 51.9 [NS]   0116 Platelets(!): 133 [NS]   0116 Lipase: 48 [NS]   0133 Labs reviewed, as above. Procal negative. Covid negative. Trop negative and BNP not significantly elevated. Lab called about significantly elevated lactic acid, 8.0. Initiated IVF. Ordered pan scan.    [NS]   0142 Patient reassessed. Doing much better. More conversive. Not confused. Says feeling much better. Unable to transport him to CT w/ the high flow. Currently satting 91%. He says he is on CPAP 10 at night. Will transition him to BiPAP so he can go to the scanner.     [NS]   0301 No UTI   Urinalysis, Reflex to Urine Culture Urine, Clean Catch(!) [NS]   0326 Improving   Lactate, [place](!!): 4.7 [NS]      ED Course User Index  [NS] Toby Hatch MD                Clinical Impression:       ICD-10-CM ICD-9-CM   1. Sepsis, due to unspecified organism, unspecified whether acute organ dysfunction present  A41.9 038.9     995.91   2. Suspected Covid-19 Virus Infection  R68.89    3. Hemoptysis  R04.2 786.30   4. SOB (shortness of breath)  R06.02 786.05   5.  Pneumonia of right lower lobe due to infectious organism  J18.1 486   6. History of pulmonary fibrosis  Z87.09 V12.69   7. Hypoxemia  R09.02 799.02             ED Disposition Condition    Admit                           Toby Hatch MD  Resident  08/27/20 0326       Nuzhat Gutiérrez MD  08/27/20 0346       Nuzaht Gutiérrez MD  08/27/20 0413

## 2020-08-27 NOTE — H&P
[facility] Medicine  History & Physical    Patient Name: Manuel Casas  MRN: [id number]  Admission Date: 8/26/2020  Attending Physician: Sheila Addison MD  Primary Care Provider: Primary Doctor No         Patient information was obtained from patient, spouse/SO, past medical records and ER records.     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 diabetes mellitus type 2, sleep apnea - on CPAP at night.  He presented to the emergency department with hemoptysis, fever, cough, shortness of breath. The patient and his wife report that he was seen at an Urgent care on 8/12/2020 with reparatory problems. He was given azithromycin.  He reports improvement after complete the course.  He saw a pulmonologist, Dr. Ambrocio for the first time on 8/24/2020 and was placed on taper dose if steroids. He reports some improvement.    PTA, he reports having chills and coughing up bright red phlegm.  He had some nausea and vomited twice  His wife reports that he appeared to be very short of breath and confused.  She called her PCP and was advised to activate the EMS. Per ED physician, EMS found him to have oxygen saturation of 94% on 4L but desaturation when he moved. His O2 was increased to 6L NC. On arrival to the ED, he was noted to have saturation of 88%. His oxygen saturation and mental status was significant improved with high flow O2. He is on CPAP at home and currently on bipap. He denies chest pain. He states that he had abdominal cramp and diarrhea earlier. His wife states that she ate peaches purchased at  "[employer]. She was notified that they have Salmonella.  He denies urinary symptoms.     His wife reports that he had 3 negative COVID - 19 screen thus far. He has no sick contacts.    Past Medical History:   Diagnosis Date    Atrial flutter     Cancer     prostate    Congestive heart failure     Diabetes mellitus, type 2     Heart failure     right sided    Hyperlipidemia     Pulmonary fibrosis     Sleep apnea        Past Surgical History:   Procedure Laterality Date    ADENOIDECTOMY      CARDIAC PACEMAKER PLACEMENT      HERNIA REPAIR      left, umbilical    TONSILLECTOMY         Review of patient's allergies indicates:  No Known Allergies    No current facility-administered medications on file prior to encounter.      Current Outpatient Medications on File Prior to Encounter   Medication Sig    ACCU-CHEK SMARTVIEW TEST STRIP Strp     bicalutamide (CASODEX) 50 MG Tab TK 1 T PO QD    celecoxib (CELEBREX) 200 MG capsule Take 200 mg by mouth.    clotrimazole-betamethasone 1-0.05% (LOTRISONE) cream AIRAM EXT AA BID    ELIQUIS 5 mg Tab 5 mg 2 (two) times daily.     ESBRIET 801 mg Tab 3 (three) times daily.     escitalopram oxalate (LEXAPRO) 10 MG tablet 20 mg every morning.     famotidine (PEPCID) 20 MG tablet Take 20 mg by mouth 2 (two) times daily.    furosemide (LASIX) 40 MG tablet 40 mg every other day.     levothyroxine (SYNTHROID) 75 MCG tablet TK 1 T PO  D ON EMPTY STOMACH 1 H APART FROM FOOD/MEDS    montelukast (SINGULAIR) 10 mg tablet Take 10 mg by mouth every evening.    NOVOLIN N NPH U-100 INSULIN 100 unit/mL injection 45 Units 2 (two) times daily.     potassium chloride (KLOR-CON) 10 MEQ TbSR every other day.     predniSONE (DELTASONE) 10 MG tablet Take 3 a day x 5 days then 2 a day x 5 days then 1 a day x 5 days    pregabalin (LYRICA) 150 MG capsule Take by mouth every evening.     ULTRA COMFORT INSULIN SYRINGE 1 mL 29 gauge x 1/2" Syrg     VENTOLIN HFA 90 mcg/actuation inhaler INL 1 " TO 2 PFS PO Q 4 TO 6 H PRN     Family History     Problem Relation (Age of Onset)    Diabetes Mother    Heart disease Mother    Hypertension Mother        Tobacco Use    Smoking status: Former Smoker   Substance and Sexual Activity    Alcohol use: Not on file    Drug use: Not on file    Sexual activity: Not on file     Review of Systems   Constitutional: Unexpected weight change: weight gain.   Respiratory: Positive for cough and shortness of breath.         Hemoptysis   Cardiovascular: Positive for leg swelling.   Gastrointestinal: Positive for diarrhea.   Genitourinary: Negative for difficulty urinating.   Musculoskeletal: Positive for back pain and gait problem.   Neurological: Positive for weakness.   Psychiatric/Behavioral: Positive for confusion.   All other systems reviewed and are negative.    Objective:     Vital Signs (Most Recent):  Temp: (!) 101 °F (38.3 °C) (08/27/20 0327)  Pulse: 70 (08/27/20 0300)  Resp: 19 (08/27/20 0300)  BP: (!) 107/54 (08/27/20 0331)  SpO2: (!) 94 % (08/27/20 0259) Vital Signs (24h Range):  Temp:  [101 °F (38.3 °C)-104.9 °F (40.5 °C)] 101 °F (38.3 °C)  Pulse:  [69-70] 70  Resp:  [17-28] 19  SpO2:  [91 %-97 %] 94 %  BP: (101-147)/(53-70) 107/54     Weight: 130.2 kg (287 lb)  Body mass index is 34.93 kg/m².    Physical Exam  Constitutional:       Comments: Obese   HENT:      Head: Normocephalic and atraumatic.      Right Ear: External ear normal.      Left Ear: External ear normal.      Mouth/Throat:      Pharynx: No oropharyngeal exudate.   Eyes:      General: No scleral icterus.        Right eye: No discharge.         Left eye: No discharge.   Neck:      Musculoskeletal: Normal range of motion and neck supple. No muscular tenderness.   Cardiovascular:      Rate and Rhythm: Normal rate and regular rhythm.      Pulses: Normal pulses.      Heart sounds: Normal heart sounds.   Pulmonary:      Breath sounds: No stridor. Rales present. No wheezing.      Comments: Mildly  tachypneic  Chest:      Chest wall: No tenderness.   Abdominal:      Tenderness: There is no abdominal tenderness.      Comments: Large   Genitourinary:     Penis: Normal.    Musculoskeletal:      Right lower leg: Edema present.      Left lower leg: Edema present.   Lymphadenopathy:      Cervical: No cervical adenopathy.   Skin:     General: Skin is warm and dry.      Capillary Refill: Capillary refill takes less than 2 seconds.   Neurological:      General: No focal deficit present.      Mental Status: He is alert and oriented to person, place, and time.   Psychiatric:         Mood and Affect: Mood normal.         Behavior: Behavior normal.             Significant Labs:   CBC:   Recent Labs   Lab 08/27/20  0024 08/27/20  0054   WBC 12.24  --    HGB 17.5  --    HCT 51.9 52   *  --      CMP:   Recent Labs   Lab 08/27/20  0024      K 4.1   CL 99   CO2 23   *   BUN 26*   CREATININE 1.4   CALCIUM 9.5   PROT 6.8   ALBUMIN 4.7   BILITOT 1.4*   ALKPHOS 80   AST 33   ALT 39   ANIONGAP 18*   EGFRNONAA 44.9*     Cardiac Markers:   Recent Labs   Lab 08/27/20  0024   *     Coagulation:   Recent Labs   Lab 08/27/20  0024   INR 1.3   APTT 23.8     Lactic Acid:   Recent Labs   Lab 08/27/20  0019 08/27/20  0139   LACTATE 8.0* 4.7*     Lipase:   Recent Labs   Lab 08/27/20  0024   LIPASE 48     Magnesium:   Recent Labs   Lab 08/27/20  0024   MG 1.6     Troponin:   Recent Labs   Lab 08/27/20  0024   TROPONINI 0.036     Urine Studies:   Recent Labs   Lab 08/27/20  0235   COLORU Yellow   APPEARANCEUA Clear   PHUR 6.0   SPECGRAV 1.015   PROTEINUA Negative   GLUCUA Negative   KETONESU Negative   BILIRUBINUA Negative   OCCULTUA Trace*   NITRITE Negative   UROBILINOGEN Negative   LEUKOCYTESUR Negative       Significant Imaging: I have reviewed all pertinent imaging results/findings within the past 24 hours.   Ct Chest Abdomen Pelvis With Contrast    Result Date: 8/27/2020  EXAM: CT Chest, Abdomen and Pelvis With  Intravenous Contrast CLINICAL HISTORY: The patient is 87 years old and is Male; Sepsis TECHNIQUE: Axial computed tomography images of the chest, abdomen and pelvis with intravenous contrast.  Sagittal and coronal reformatted images were created and reviewed.  This CT exam was performed using one or more of the following dose reduction techniques:  automated exposure control, adjustment of the mA and/or kV according to patient size, and/or use of iterative reconstruction technique. COMPARISON: No relevant prior studies available. FINDINGS: CHEST: LUNGS:  Patchy area of groundglass opacity within the right lower lobe with interlobular septal thickening is present. Dependent atelectasis within the lung bases is also noted. PLEURAL SPACE:  Small moderate left pleural effusion is present.  No pneumothorax. HEART:  No cardiomegaly.  No pericardial effusion. MEDIASTINUM:  The tracheobronchial tree is widely patent. ABDOMEN: LIVER:  Unremarkable.  No mass. GALLBLADDER AND BILE DUCTS:  No calcified stones.  No ductal dilation. PANCREAS:  No ductal dilation.  No mass. SPLEEN:  Unremarkable. ADRENALS:  Unremarkable.  No mass. KIDNEYS AND URETERS:  Unremarkable. The kidneys enhance symmetrically. No obstructing renal or ureteral calculus is seen. No hydronephrosis or hydroureter. No perinephric fluid or stranding. STOMACH AND BOWEL:  The stomach is well distended with food contents. The small bowel is normal in caliber. Stool is present throughout the colon. Scattered colonic diverticula are noted without surrounding inflammation. There is no bowel obstruction. PELVIS: APPENDIX:  No findings to suggest acute appendicitis. BLADDER:  The bladder is nearly empty. REPRODUCTIVE:  Unremarkable as visualized. CHEST, ABDOMEN and PELVIS: INTRAPERITONEAL SPACE:  Unremarkable.  No significant fluid collection.  No free air. BONES/JOINTS:  Multilevel degenerative change of the spine is present. SOFT TISSUES:  Postsurgical change of the  left inguinal region is present. VASCULATURE:  Atherosclerosis of the vasculature is present.  No aortic aneurysm. LYMPH NODES:  Shotty mediastinal lymph nodes are present. TUBES, LINES AND DEVICES:  A dual-lead left-sided pacemaker is present. IMPRESSION: 1.  Left pleural effusion with associated bibasilar atelectasis and infectious process within the right lower lobe. 2.  Colonic diverticulosis. Electronically signed by:  Zulma Cruz MD  8/27/2020 3:01 AM CDT Workstation: [id number]      Assessment/Plan:     * Sepsis  As evidenced by fever, tachypneic, leukocytosis, right lower lobe infiltrates  Initial lactic acid 8.0, repeat lactic acid 4.8  COVID-19 screen negative  IV infusion at 130 mL/hr, IV bolus not given due to questionable history of heart failure and reports of edema and mely gain  IV cefepime and IV vancomycin start in the ED  Repeat lactic acid in 4 hours  NPO  Consult intensivist      Pneumonia  In the background of pulmonary fibrosis and chronic hypoxic respiratory failure  Continue Esbriet, his wife will bring medication from home  Currently on BiPAP, titrate as needed  IV solumedrol given in the ED  Prednisone  40 mg PO daily  Follow blood cultures  Consult pulmonology        Diabetes mellitus  Monitor blood glucose  Continue basal insulin  Low-dose sliding scale insulin  Hypoglycemic measures as needed      Hypothyroidism  Chronic medical condition  Continue home medication      Sleep apnea  On CPAP at home      Presence of cardiac pacemaker        Atrial flutter  EKG showed pace rhythm  Hold Eliquis for now given reports of hemoptysis      VTE Risk Mitigation (From admission, onward)         Ordered     Reason for No Pharmacological VTE Prophylaxis  Once     Question:  Reasons:  Answer:  Risk of Bleeding    08/27/20 0341     IP VTE HIGH RISK PATIENT  Once      08/27/20 0341     Place sequential compression device  Until discontinued      08/27/20 0341                   Caitlyn Villalpando,  APRN  Department of Hospital Medicine   [facility]

## 2020-08-27 NOTE — SUBJECTIVE & OBJECTIVE
"Past Medical History:   Diagnosis Date    Atrial flutter     Cancer     prostate    Congestive heart failure     Diabetes mellitus, type 2     Heart failure     right sided    Hyperlipidemia     Pulmonary fibrosis     Sleep apnea        Past Surgical History:   Procedure Laterality Date    ADENOIDECTOMY      CARDIAC PACEMAKER PLACEMENT      HERNIA REPAIR      left, umbilical    TONSILLECTOMY         Review of patient's allergies indicates:  No Known Allergies    No current facility-administered medications on file prior to encounter.      Current Outpatient Medications on File Prior to Encounter   Medication Sig    ACCU-CHEK SMARTVIEW TEST STRIP Strp     bicalutamide (CASODEX) 50 MG Tab TK 1 T PO QD    celecoxib (CELEBREX) 200 MG capsule Take 200 mg by mouth.    clotrimazole-betamethasone 1-0.05% (LOTRISONE) cream AIRAM EXT AA BID    ELIQUIS 5 mg Tab 5 mg 2 (two) times daily.     ESBRIET 801 mg Tab 3 (three) times daily.     escitalopram oxalate (LEXAPRO) 10 MG tablet 20 mg every morning.     famotidine (PEPCID) 20 MG tablet Take 20 mg by mouth 2 (two) times daily.    furosemide (LASIX) 40 MG tablet 40 mg every other day.     levothyroxine (SYNTHROID) 75 MCG tablet TK 1 T PO  D ON EMPTY STOMACH 1 H APART FROM FOOD/MEDS    montelukast (SINGULAIR) 10 mg tablet Take 10 mg by mouth every evening.    NOVOLIN N NPH U-100 INSULIN 100 unit/mL injection 45 Units 2 (two) times daily.     potassium chloride (KLOR-CON) 10 MEQ TbSR every other day.     predniSONE (DELTASONE) 10 MG tablet Take 3 a day x 5 days then 2 a day x 5 days then 1 a day x 5 days    pregabalin (LYRICA) 150 MG capsule Take by mouth every evening.     ULTRA COMFORT INSULIN SYRINGE 1 mL 29 gauge x 1/2" Syrg     VENTOLIN HFA 90 mcg/actuation inhaler INL 1 TO 2 PFS PO Q 4 TO 6 H PRN     Family History     Problem Relation (Age of Onset)    Diabetes Mother    Heart disease Mother    Hypertension Mother        Tobacco Use    Smoking " status: Former Smoker   Substance and Sexual Activity    Alcohol use: Not on file    Drug use: Not on file    Sexual activity: Not on file     Review of Systems   Constitutional: Unexpected weight change: weight gain.   Respiratory: Positive for cough and shortness of breath.         Hemoptysis   Cardiovascular: Positive for leg swelling.   Gastrointestinal: Positive for diarrhea.   Genitourinary: Negative for difficulty urinating.   Musculoskeletal: Positive for back pain and gait problem.   Neurological: Positive for weakness.   Psychiatric/Behavioral: Positive for confusion.   All other systems reviewed and are negative.    Objective:     Vital Signs (Most Recent):  Temp: (!) 101 °F (38.3 °C) (08/27/20 0327)  Pulse: 70 (08/27/20 0300)  Resp: 19 (08/27/20 0300)  BP: (!) 107/54 (08/27/20 0331)  SpO2: (!) 94 % (08/27/20 0259) Vital Signs (24h Range):  Temp:  [101 °F (38.3 °C)-104.9 °F (40.5 °C)] 101 °F (38.3 °C)  Pulse:  [69-70] 70  Resp:  [17-28] 19  SpO2:  [91 %-97 %] 94 %  BP: (101-147)/(53-70) 107/54     Weight: 130.2 kg (287 lb)  Body mass index is 34.93 kg/m².    Physical Exam  Constitutional:       Comments: Obese   HENT:      Head: Normocephalic and atraumatic.      Right Ear: External ear normal.      Left Ear: External ear normal.      Mouth/Throat:      Pharynx: No oropharyngeal exudate.   Eyes:      General: No scleral icterus.        Right eye: No discharge.         Left eye: No discharge.   Neck:      Musculoskeletal: Normal range of motion and neck supple. No muscular tenderness.   Cardiovascular:      Rate and Rhythm: Normal rate and regular rhythm.      Pulses: Normal pulses.      Heart sounds: Normal heart sounds.   Pulmonary:      Breath sounds: No stridor. Rales present. No wheezing.      Comments: Mildly tachypneic  Chest:      Chest wall: No tenderness.   Abdominal:      Tenderness: There is no abdominal tenderness.      Comments: Large   Genitourinary:     Penis: Normal.    Musculoskeletal:       Right lower leg: Edema present.      Left lower leg: Edema present.   Lymphadenopathy:      Cervical: No cervical adenopathy.   Skin:     General: Skin is warm and dry.      Capillary Refill: Capillary refill takes less than 2 seconds.   Neurological:      General: No focal deficit present.      Mental Status: He is alert and oriented to person, place, and time.   Psychiatric:         Mood and Affect: Mood normal.         Behavior: Behavior normal.             Significant Labs:   CBC:   Recent Labs   Lab 08/27/20  0024 08/27/20  0054   WBC 12.24  --    HGB 17.5  --    HCT 51.9 52   *  --      CMP:   Recent Labs   Lab 08/27/20  0024      K 4.1   CL 99   CO2 23   *   BUN 26*   CREATININE 1.4   CALCIUM 9.5   PROT 6.8   ALBUMIN 4.7   BILITOT 1.4*   ALKPHOS 80   AST 33   ALT 39   ANIONGAP 18*   EGFRNONAA 44.9*     Cardiac Markers:   Recent Labs   Lab 08/27/20  0024   *     Coagulation:   Recent Labs   Lab 08/27/20  0024   INR 1.3   APTT 23.8     Lactic Acid:   Recent Labs   Lab 08/27/20  0019 08/27/20  0139   LACTATE 8.0* 4.7*     Lipase:   Recent Labs   Lab 08/27/20  0024   LIPASE 48     Magnesium:   Recent Labs   Lab 08/27/20  0024   MG 1.6     Troponin:   Recent Labs   Lab 08/27/20  0024   TROPONINI 0.036     Urine Studies:   Recent Labs   Lab 08/27/20  0235   COLORU Yellow   APPEARANCEUA Clear   PHUR 6.0   SPECGRAV 1.015   PROTEINUA Negative   GLUCUA Negative   KETONESU Negative   BILIRUBINUA Negative   OCCULTUA Trace*   NITRITE Negative   UROBILINOGEN Negative   LEUKOCYTESUR Negative       Significant Imaging: I have reviewed all pertinent imaging results/findings within the past 24 hours.   Ct Chest Abdomen Pelvis With Contrast    Result Date: 8/27/2020  EXAM: CT Chest, Abdomen and Pelvis With Intravenous Contrast CLINICAL HISTORY: The patient is 87 years old and is Male; Sepsis TECHNIQUE: Axial computed tomography images of the chest, abdomen and pelvis with intravenous contrast.   Sagittal and coronal reformatted images were created and reviewed.  This CT exam was performed using one or more of the following dose reduction techniques:  automated exposure control, adjustment of the mA and/or kV according to patient size, and/or use of iterative reconstruction technique. COMPARISON: No relevant prior studies available. FINDINGS: CHEST: LUNGS:  Patchy area of groundglass opacity within the right lower lobe with interlobular septal thickening is present. Dependent atelectasis within the lung bases is also noted. PLEURAL SPACE:  Small moderate left pleural effusion is present.  No pneumothorax. HEART:  No cardiomegaly.  No pericardial effusion. MEDIASTINUM:  The tracheobronchial tree is widely patent. ABDOMEN: LIVER:  Unremarkable.  No mass. GALLBLADDER AND BILE DUCTS:  No calcified stones.  No ductal dilation. PANCREAS:  No ductal dilation.  No mass. SPLEEN:  Unremarkable. ADRENALS:  Unremarkable.  No mass. KIDNEYS AND URETERS:  Unremarkable. The kidneys enhance symmetrically. No obstructing renal or ureteral calculus is seen. No hydronephrosis or hydroureter. No perinephric fluid or stranding. STOMACH AND BOWEL:  The stomach is well distended with food contents. The small bowel is normal in caliber. Stool is present throughout the colon. Scattered colonic diverticula are noted without surrounding inflammation. There is no bowel obstruction. PELVIS: APPENDIX:  No findings to suggest acute appendicitis. BLADDER:  The bladder is nearly empty. REPRODUCTIVE:  Unremarkable as visualized. CHEST, ABDOMEN and PELVIS: INTRAPERITONEAL SPACE:  Unremarkable.  No significant fluid collection.  No free air. BONES/JOINTS:  Multilevel degenerative change of the spine is present. SOFT TISSUES:  Postsurgical change of the left inguinal region is present. VASCULATURE:  Atherosclerosis of the vasculature is present.  No aortic aneurysm. LYMPH NODES:  Shotty mediastinal lymph nodes are present. TUBES, LINES AND  DEVICES:  A dual-lead left-sided pacemaker is present. IMPRESSION: 1.  Left pleural effusion with associated bibasilar atelectasis and infectious process within the right lower lobe. 2.  Colonic diverticulosis. Electronically signed by:  Zulma Cruz MD  8/27/2020 3:01 AM CDT Workstation: [id number]

## 2020-08-27 NOTE — PROGRESS NOTES
"[facility]  Adult Nutrition   Progress Note (Initial Assessment)     SUMMARY     Recommendations  Recommendation/Intervention: 1. RD to monitor days NPO and ability to initiate oral diet. If unable to feed orally within next 48 - 72 hrs, will recommend nutrition support be considered/initiated. Recommend cardiac/diabetic diet restrictions if/when diet initiated. 2.  Recommend continued monitoring and management of blood glucose and electrolytes.  Goals: 1. Diet or nutrition support to be initiated. 2. Patient to meet at least 75% of estimated energy and protein needs. 3. Blood glucose and electrolytes to trend towards normal limits/ target ranges.  Nutrition Goal Status: new  Communication of RD Recs: reviewed with RN    Dietitian Rounds Brief  Patient asleep and on high flow BiPap at time of rounds. No plans to feed at this time. RD to monitor NPO status and ability to feed orally.    Reason for Assessment  Reason For Assessment: other (see comments)(ICU status)  Relevant Medical History: sleep apnea, sepsis, pneumonia, diabetes mellitis, CHF, pulmonary fibrosis  Interdisciplinary Rounds: attended    Nutrition Risk Screen  Nutrition Risk Screen: other (see comments)(high flow BiPap)    Nutrition/Diet History  Food Allergies: NKFA  Factors Affecting Nutritional Intake: NPO    Anthropometrics  Temp: 98.5 °F (36.9 °C)  Height Method: Stated  Height: 6' 4" (193 cm)  Height (inches): 76 in  Weight Method: Bed Scale  Weight: 131.2 kg (289 lb 3.9 oz)  Weight (lb): 289.25 lb  Ideal Body Weight (IBW), Male: 202 lb  % Ideal Body Weight, Male (lb): 143.19 %  BMI (Calculated): 35.2  BMI Grade: 35 - 39.9 - obesity - grade II       Weight History:  Wt Readings from Last 10 Encounters:   08/27/20 131.2 kg (289 lb 3.9 oz)   08/24/20 (P) 130.2 kg (287 lb)   09/23/19 124.7 kg (275 lb)   05/16/19 124.7 kg (275 lb)       Lab/Procedures/Meds: Pertinent Labs Reviewed    Clinical Chemistry:  Recent Labs   Lab " 08/27/20  0024      K 4.1   CL 99   CO2 23   *   BUN 26*   CREATININE 1.4   CALCIUM 9.5   PROT 6.8   ALBUMIN 4.7   BILITOT 1.4*   ALKPHOS 80   AST 33   ALT 39   ANIONGAP 18*   ESTGFRAFRICA 51.9*   EGFRNONAA 44.9*   MG 1.6   LIPASE 48       CBC:   Recent Labs   Lab 08/27/20  0024 08/27/20  0054   WBC 12.24  --    RBC 5.23  --    HGB 17.5  --    HCT 51.9 52   *  --    MCV 99*  --    MCH 33.5*  --    MCHC 33.7  --        Cardiac Profile:  Recent Labs   Lab 08/27/20 0024   *   CPK 55   TROPONINI 0.036       Inflammatory Labs:  Recent Labs   Lab 08/27/20 0024   CRP 0.25       Medications: Pertinent Medications reviewed    Scheduled Meds:   apixaban  2.5 mg Oral BID    bicalutamide  50 mg Oral Daily    ceFEPime (MAXIPIME) IVPB  2 g Intravenous Q12H    chlorhexidine  15 mL Mouth/Throat BID    clotrimazole-betamethasone 1-0.05%   Topical (Top) BID    doxycycline  100 mg Oral Q12H    escitalopram oxalate  20 mg Oral Daily    famotidine  20 mg Oral BID    insulin NPH  45 Units Subcutaneous BID    levothyroxine  75 mcg Oral Before breakfast    montelukast  10 mg Oral QHS    mupirocin   Nasal BID    pirfenidone  801 mg Oral TID    predniSONE  40 mg Oral Daily    pregabalin  150 mg Oral QHS    [START ON 8/28/2020] vancomycin (VANCOCIN) IVPB  2,000 mg Intravenous Q24H       Continuous Infusions:   lactated ringers 130 mL/hr at 08/27/20 0834       PRN Meds:.acetaminophen, acetaminophen, albuterol, bisacodyL, dextrose 50%, dextrose 50%, glucagon (human recombinant), glucose, glucose, insulin aspart U-100, magnesium oxide, magnesium oxide, ondansetron, potassium chloride, potassium chloride, potassium chloride, potassium, sodium phosphates, potassium, sodium phosphates, potassium, sodium phosphates, promethazine (PHENERGAN) IVPB, sodium chloride 0.9%, Pharmacy to dose Vancomycin consult **AND** vancomycin - pharmacy to dose    Estimated/Assessed Needs  Weight Used For Calorie  Calculations: 131.2 kg (289 lb 3.9 oz)  Energy Calorie Requirements (kcal): 1443 - 1837 (11 - 14 kcal/kg)  Energy Need Method: Kcal/kg  Protein Requirements: 138 (1.5 g/kg IBW)  Weight Used For Protein Calculations: 92 kg (202 lb 13.2 oz)(IBW)  Fluid Requirements (mL): 3280 (25 ml/kg) or per MD  Estimated Fluid Requirement Method: other (see comments)  RDA Method (mL): 1443       Nutrition Prescription Ordered  Current Diet Order: NPO    Evaluation of Received Nutrient/Fluid Intake  Energy Calories Required: not meeting needs  Protein Required: not meeting needs  Fluid Required: meeting needs  Comments: Patient asleep and on high flow BiPap at time of rounds. No plans to feed at this time. RD to monitor NPO status and ability to feed orally.  Tolerance: tolerating   No intake or output data in the 24 hours ending 08/27/20 1542   % Intake of Estimated Energy Needs: 0%  % Meal Intake: NPO    Nutrition Risk  Level of Risk/Frequency of Follow-up: high     Monitor and Evaluation  Food and Nutrient Intake: energy intake  Food and Nutrient Adminstration: diet order  Physical Activity and Function: nutrition-related ADLs and IADLs, factors affecting access to physical activity  Anthropometric Measurements: weight, weight change, body mass index  Biochemical Data, Medical Tests and Procedures: electrolyte and renal panel, inflammatory profile, lipid profile, gastrointestinal profile, glucose/endocrine profile  Nutrition-Focused Physical Findings: overall appearance     Nutrition Follow-Up  RD Follow-up?: Yes    Kala King RD 08/27/2020 3:42 PM

## 2020-08-27 NOTE — PROGRESS NOTES
Reviewed admitting provider's note and plan.  Discussed case with Pulmonary in detail.  Overall patient is improving however still remains on BiPAP with high FiO2.  Lactic acid is improving.  Not in shock anymore.  Hemodynamics are acceptable.    Plan:  Continue sepsis protocol with IV fluids, repeat lactic acid every 4 hours  Continue broad-spectrum antibiotics, added doxycycline  Blood cultures are growing gram-negative rods-will repeat cultures tomorrow morning  Remains on steroid  Overall presentation is very consistent with bacterial pneumonia, bacteremia, sepsis.  Discontinue airborne and contact isolation  Daily blood cultures until negative  Stool studies

## 2020-08-27 NOTE — PLAN OF CARE
08/27/20 0949   Patient Assessment/Suction   Level of Consciousness (AVPU)   (asleep comfortably)   Respiratory Effort Normal;Unlabored   Expansion/Accessory Muscles/Retractions expansion symmetric;no use of accessory muscles   IFEANYI Breath Sounds clear   LLL Breath Sounds diminished   RUL Breath Sounds clear   RML Breath Sounds clear;diminished   RLL Breath Sounds diminished   Rhythm/Pattern, Respiratory assisted mechanically;pattern regular   PRE-TX-O2   O2 Device (Oxygen Therapy) BiPAP   Oxygen Concentration (%) 100   SpO2 (!) 92 %   Pulse Oximetry Type Continuous   $ Pulse Oximetry - Multiple Charge Pulse Oximetry - Multiple   Pulse 70   Resp (!) 22   Positioning HOB elevated 45 degrees   Aerosol Therapy   $ Aerosol Therapy Charges PRN treatment not required   Preset CPAP/BiPAP Settings   Mode Of Delivery BiPAP S/T   $ Initial CPAP/BiPAP Setup? No   $ Is patient using? Yes   Size of Mask Large   Sized Appropriately? Yes   Equipment Type V60   Airway Device Type large full face mask   Ipap 18   EPAP (cm H2O) 10   Pressure Support (cm H2O) 8   Set Rate (Breaths/Min) 18   ITime (sec) 1   Rise Time (sec) 2   Patient CPAP/BiPAP Settings   RR Total (Breaths/Min) 20   Tidal Volume (mL) 769   VE Minute Ventilation (L/min) 17.3 L/min   Peak Inspiratory Pressure (cm H2O) 23   TiTOT (%) 41   Total Leak (L/Min) 89   Patient Trigger - ST Mode Only (%) 46   Respiratory Evaluation   $ Care Plan Tech Time 15 min   Evaluation For Transfer

## 2020-08-27 NOTE — HPI
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 diabetes mellitus type 2, sleep apnea - on CPAP at night.  He presented to the emergency department with hemoptysis, fever, cough, shortness of breath. The patient and his wife report that he was seen at an Urgent care on 8/12/2020 with reparatory problems. He was given azithromycin.  He reports improvement after complete the course.  He saw a pulmonologist, Dr. Ambrocio for the first time on 8/24/2020 and was placed on taper dose if steroids. He reports some improvement.    PTA, he reports having chills and coughing up bright red phlegm.  He had some nausea and vomited twice  His wife reports that he appeared to be very short of breath and confused.  She called her PCP and was advised to activate the EMS. Per ED physician, EMS found him to have oxygen saturation of 94% on 4L but desaturation when he moved. His O2 was increased to 6L NC. On arrival to the ED, he was noted to have saturation of 88%. His oxygen saturation and mental status was significant improved with high flow O2. He is on CPAP at home and currently on bipap. He denies chest pain. He states that he had abdominal cramp and diarrhea earlier. His wife states that she ate peaches purchased at [employer]. She was notified that they have Salmonella.  He denies urinary symptoms.     His wife reports that he had 3 negative COVID - 19 screen thus far. He has no sick contacts.

## 2020-08-27 NOTE — PLAN OF CARE
Patient:   MARIO OLIVARES            MRN: [id number]            FIN: [id number]               Age:   39 hours     Sex:  FEMALE     :  19   Associated Diagnoses:   None   Author:   LARA JONES      Basic Information   Patient Information:     Stay summary:     did well overnight.  breastfeeding improving.  parents feel comfortable with discharge home   .         Growth parameters: Birth Measurements   19 11:45           Birth Weight              3.210 kg     .    Present at bedside:  Mother.       Review of Systems   Not applicable:  Patient is .       Health Status   Current medications: None      Histories      Maternal History   Include maternal history : OB DOCUMENTATION FLOWSHEET   19 20:05 Date/Time of Birth 19 19:57     Presentation at Delivery Occiput Anterior (OA)    EGA at Birth 39 4/7 weeks    Umbilical Cord Description 3 Vessel Cord   19 20:00 Apgar 1 Minute by History 9    Apgar 5 Minute by History 9   19 02:24 Maternal Risk Factors in Utero Group B Streptococcus    Feeding Preference Exclusive Breast Milk    Maternal  1    Number of Prenatal Visits 10    Reason for Maternal Fetal Med Consult Other: tachycardia    Maternal Blood Type Transcribed B Negative    Maternal HBsAg Transcribed Negative    Maternal RPR Transcribed Non Reactive    Maternal Rubella Transcribed Immune    Maternal HIV Transcribed Negative    Maternal HIV Transcribed 2 Negative    Maternal Varicella Transcribed Positive    Maternal Group B Strep Transcribed Positive    Maternal Chlamydia Transcribed Negative    Maternal Gonorrhea Transcribed Negative      Appropriate IAP given to mother      Physical Examination   VS/Measurements   Measurements from flowsheet : Height and Weight   19 11:45 CLINICALWEIGHT 2.960 kg   19 00:00 CLINICALWEIGHT 2.960 kg      ,      Vitals between:   2019 11:51:55   TO   2019 11:51:55                   LAST  Pt could need home health at discharge. Pt has good family support.  Pharmacy at [facility] and [facility] mail order. Cm to follow until discharge from hospital.    08/27/20 3656   Discharge Assessment   Assessment Type Discharge Planning Assessment   Confirmed/corrected address and phone number on facesheet? Yes   Assessment information obtained from? Other  (Hannah Casas  [phone])   Communicated expected length of stay with patient/caregiver no   Prior to hospitilization cognitive status: Alert/Oriented   Prior to hospitalization functional status: Assistive Equipment  (Uses walker)   Current cognitive status: Alert/Oriented   Current Functional Status: Needs Assistance   Lives With spouse   Able to Return to Prior Arrangements yes   Is patient able to care for self after discharge? Unable to determine at this time (comments)   Who are your caregiver(s) and their phone number(s)? Hannah Casas wife [phone]   Patient's perception of discharge disposition home or selfcare   Readmission Within the Last 30 Days no previous admission in last 30 days   Patient currently being followed by outpatient case management? Yes   If yes, name of outpatient case management following: insurance company assigned oupatient case management   Patient currently receives any other outside agency services? No   Equipment Currently Used at Home walker, rolling;cane, straight;3-in-1 commode;CPAP;oxygen;glucometer;bath bench;wheelchair   Part D Coverage [employer]   Do you have any problems affording any of your prescribed medications? No   Is the patient taking medications as prescribed? yes   Does the patient have transportation home? Yes   Transportation Anticipated family or friend will provide   Dialysis Name and Scheduled days N/A   Does the patient receive services at the Coumadin Clinic? No   Discharge Plan A Home Health   Discharge Plan B Home   DME Needed Upon Discharge  none   Patient/Family in Agreement with Plan yes      RESULT MINIMUM MAXIMUM  Temperature 36.6 36.6 36.8  Heart Rate 140 132 144  Respiratory Rate 40 40 42     General:  No acute distress, Alert, Responsive.    Eye:  Normal conjunctiva.         Red reflex: Bilaterally, Present.    HENT:  Normocephalic, Anterior fontanelle open/soft/flat, Ears normally set and rotated, Nares patent, Palate intact.    Neck:  Supple, No lymphadenopathy, Full range of motion, Clavicles intact.    Respiratory:  Lungs are clear to auscultation, Respirations are non-labored, Breath sounds are equal, Symmetrical chest wall expansion.    Cardiovascular:  Normal rate, Regular rhythm, No murmur, Good pulses equal in all extremities, Normal peripheral perfusion.    Gastrointestinal:  Soft, Non-distended, Normal bowel sounds, No organomegaly, Anus patent.    Genitourinary:  Normal genitalia for age and sex.    Musculoskeletal     Normal range of motion.     No deformity.     No hip clicks.     Integumentary:  Warm, Dry.    Neurologic:  Alert, Moves all extremities appropriately, [place], rooting, sucking reflexes are normal.       Review / Management   Results review:       Labs between:  2019 11:51 to 2019 11:51    CMP:                 AST  ALT  AlkPhos  Bili  Albumin   2019       (H) 9.0     2019       (H) 7.1                    .         Interpretation: 38h bili LIR.    Condition:  Stable.       Health Maintenance   Medication Administered:  Medication administered no Hepatitis B vaccine (prefer to get at PCPs office), Phytonadione, erythromycin 0.5% ophthalmic ointment applied in both eyes, and administered prior to leaving delivery room.    Care Practices/ Screens   Hearing screen: both ears pass.    state screen: Pending.         Impression and Plan   Diagnosis     AGA baby born @ 39 4/7 WGA to  mother after uncomplicated  course.  Mom GBS+ and PROM but appropriately treated.  Baby doing great and stable for discharge. .     Course:  Progressing  as expected.       Discharge Information   Disposition   Discharge to: home.      Diet/Nutrition   Breastfeed  ad lisa.      Follow Up Appointments   F/U with Dr. Bárbara Miner at [facility] on 3/5 @ 15:40.              Electronically Signed On 2019 11:56  __________________________________________________   LARA JONES

## 2020-08-27 NOTE — PROGRESS NOTES
Reassessment of Volume Status and Tissue Perfusion    Following initial fluid resuscitation, I have reassessed the patients hemodynamic status. I have reviewed the patients capillary refill, skin color, vital signs, peripheral pulses and urinary output. The patient has adequate perfusion and is no longer in shock.      Will keep trending lactic acid until below 2, continue maintenance IV fluids

## 2020-08-28 PROBLEM — G47.30 SLEEP APNEA: Status: RESOLVED | Noted: 2020-08-24 | Resolved: 2020-08-28

## 2020-08-28 PROBLEM — R78.81 GRAM-NEGATIVE BACTEREMIA: Status: ACTIVE | Noted: 2020-08-28

## 2020-08-28 LAB
ALBUMIN SERPL BCP-MCNC: 3.5 G/DL (ref 3.5–5.2)
ALP SERPL-CCNC: 41 U/L (ref 55–135)
ALT SERPL W/O P-5'-P-CCNC: 24 U/L (ref 10–44)
ANION GAP SERPL CALC-SCNC: 8 MMOL/L (ref 8–16)
AST SERPL-CCNC: 18 U/L (ref 10–40)
BASOPHILS NFR BLD: 0 % (ref 0–1.9)
BILIRUB SERPL-MCNC: 1.8 MG/DL (ref 0.1–1)
BUN SERPL-MCNC: 30 MG/DL (ref 8–23)
CALCIUM SERPL-MCNC: 8.6 MG/DL (ref 8.7–10.5)
CHLORIDE SERPL-SCNC: 103 MMOL/L (ref 95–110)
CO2 SERPL-SCNC: 26 MMOL/L (ref 23–29)
CREAT SERPL-MCNC: 1.2 MG/DL (ref 0.5–1.4)
DIFFERENTIAL METHOD: ABNORMAL
EOSINOPHIL NFR BLD: 0 % (ref 0–8)
ERYTHROCYTE [DISTWIDTH] IN BLOOD BY AUTOMATED COUNT: 13.5 % (ref 11.5–14.5)
EST. GFR  (AFRICAN AMERICAN): >60 ML/MIN/1.73 M^2
EST. GFR  (NON AFRICAN AMERICAN): 54 ML/MIN/1.73 M^2
GLUCOSE SERPL-MCNC: 143 MG/DL (ref 70–110)
GLUCOSE SERPL-MCNC: 155 MG/DL (ref 70–110)
GLUCOSE SERPL-MCNC: 169 MG/DL (ref 70–110)
GLUCOSE SERPL-MCNC: 196 MG/DL (ref 70–110)
HCT VFR BLD AUTO: 42.2 % (ref 40–54)
HGB BLD-MCNC: 14.1 G/DL (ref 14–18)
IMM GRANULOCYTES # BLD AUTO: ABNORMAL K/UL (ref 0–0.04)
IMM GRANULOCYTES NFR BLD AUTO: ABNORMAL % (ref 0–0.5)
LACTATE SERPL-SCNC: 1.8 MMOL/L (ref 0.5–1.9)
LACTATE SERPL-SCNC: 2.3 MMOL/L (ref 0.5–1.9)
LACTATE SERPL-SCNC: 2.4 MMOL/L (ref 0.5–1.9)
LACTATE SERPL-SCNC: 2.4 MMOL/L (ref 0.5–1.9)
LACTATE SERPL-SCNC: 2.9 MMOL/L (ref 0.5–1.9)
LYMPHOCYTES NFR BLD: 23 % (ref 18–48)
MAGNESIUM SERPL-MCNC: 1.8 MG/DL (ref 1.6–2.6)
MCH RBC QN AUTO: 32.6 PG (ref 27–31)
MCHC RBC AUTO-ENTMCNC: 33.4 G/DL (ref 32–36)
MCV RBC AUTO: 98 FL (ref 82–98)
MONOCYTES NFR BLD: 6 % (ref 4–15)
NEUTROPHILS NFR BLD: 39 % (ref 38–73)
NEUTS BAND NFR BLD MANUAL: 32 %
NRBC BLD-RTO: 0 /100 WBC
OB PNL STL: POSITIVE
PHOSPHATE SERPL-MCNC: 3.1 MG/DL (ref 2.7–4.5)
PLATELET # BLD AUTO: 92 K/UL (ref 150–350)
PMV BLD AUTO: 10.8 FL (ref 9.2–12.9)
POTASSIUM SERPL-SCNC: 4.2 MMOL/L (ref 3.5–5.1)
PROT SERPL-MCNC: 5.3 G/DL (ref 6–8.4)
RBC # BLD AUTO: 4.32 M/UL (ref 4.6–6.2)
SODIUM SERPL-SCNC: 137 MMOL/L (ref 136–145)
WBC # BLD AUTO: 15.51 K/UL (ref 3.9–12.7)
WBC #/AREA STL HPF: NORMAL /[HPF]

## 2020-08-28 PROCEDURE — 25000003 PHARM REV CODE 250: Performed by: INTERNAL MEDICINE

## 2020-08-28 PROCEDURE — 83605 ASSAY OF LACTIC ACID: CPT | Mod: 91

## 2020-08-28 PROCEDURE — 25000003 PHARM REV CODE 250: Performed by: STUDENT IN AN ORGANIZED HEALTH CARE EDUCATION/TRAINING PROGRAM

## 2020-08-28 PROCEDURE — 85007 BL SMEAR W/DIFF WBC COUNT: CPT

## 2020-08-28 PROCEDURE — 84100 ASSAY OF PHOSPHORUS: CPT

## 2020-08-28 PROCEDURE — 87015 SPECIMEN INFECT AGNT CONCNTJ: CPT

## 2020-08-28 PROCEDURE — 94761 N-INVAS EAR/PLS OXIMETRY MLT: CPT

## 2020-08-28 PROCEDURE — 87209 SMEAR COMPLEX STAIN: CPT

## 2020-08-28 PROCEDURE — 36415 COLL VENOUS BLD VENIPUNCTURE: CPT

## 2020-08-28 PROCEDURE — 99291 PR CRITICAL CARE, E/M 30-74 MINUTES: ICD-10-PCS | Mod: ,,, | Performed by: INTERNAL MEDICINE

## 2020-08-28 PROCEDURE — 20000000 HC ICU ROOM

## 2020-08-28 PROCEDURE — 89055 LEUKOCYTE ASSESSMENT FECAL: CPT

## 2020-08-28 PROCEDURE — 83735 ASSAY OF MAGNESIUM: CPT

## 2020-08-28 PROCEDURE — 82272 OCCULT BLD FECES 1-3 TESTS: CPT

## 2020-08-28 PROCEDURE — 63600175 PHARM REV CODE 636 W HCPCS: Performed by: NURSE PRACTITIONER

## 2020-08-28 PROCEDURE — 87040 BLOOD CULTURE FOR BACTERIA: CPT

## 2020-08-28 PROCEDURE — 80053 COMPREHEN METABOLIC PANEL: CPT

## 2020-08-28 PROCEDURE — 27000221 HC OXYGEN, UP TO 24 HOURS

## 2020-08-28 PROCEDURE — 99291 CRITICAL CARE FIRST HOUR: CPT | Mod: ,,, | Performed by: INTERNAL MEDICINE

## 2020-08-28 PROCEDURE — 99900035 HC TECH TIME PER 15 MIN (STAT)

## 2020-08-28 PROCEDURE — 25000003 PHARM REV CODE 250: Performed by: HOSPITALIST

## 2020-08-28 PROCEDURE — 25000003 PHARM REV CODE 250: Performed by: NURSE PRACTITIONER

## 2020-08-28 PROCEDURE — 85027 COMPLETE CBC AUTOMATED: CPT

## 2020-08-28 PROCEDURE — 94660 CPAP INITIATION&MGMT: CPT

## 2020-08-28 PROCEDURE — 63600175 PHARM REV CODE 636 W HCPCS: Performed by: STUDENT IN AN ORGANIZED HEALTH CARE EDUCATION/TRAINING PROGRAM

## 2020-08-28 RX ORDER — DIPHENHYDRAMINE HCL 25 MG
50 CAPSULE ORAL NIGHTLY PRN
Status: DISCONTINUED | OUTPATIENT
Start: 2020-08-28 | End: 2020-09-04 | Stop reason: HOSPADM

## 2020-08-28 RX ADMIN — FAMOTIDINE 20 MG: 20 TABLET ORAL at 08:08

## 2020-08-28 RX ADMIN — CLOTRIMAZOLE AND BETAMETHASONE DIPROPIONATE 1 G: 10; .5 CREAM TOPICAL at 09:08

## 2020-08-28 RX ADMIN — PIRFENIDONE 801 MG: 801 TABLET, FILM COATED ORAL at 08:08

## 2020-08-28 RX ADMIN — DIPHENHYDRAMINE HYDROCHLORIDE 50 MG: 25 CAPSULE ORAL at 09:08

## 2020-08-28 RX ADMIN — DOXYCYCLINE HYCLATE 100 MG: 100 CAPSULE ORAL at 08:08

## 2020-08-28 RX ADMIN — DOXYCYCLINE HYCLATE 100 MG: 100 CAPSULE ORAL at 09:08

## 2020-08-28 RX ADMIN — CEFEPIME HYDROCHLORIDE 2 G: 2 INJECTION, SOLUTION INTRAVENOUS at 01:08

## 2020-08-28 RX ADMIN — FAMOTIDINE 20 MG: 20 TABLET ORAL at 09:08

## 2020-08-28 RX ADMIN — ESCITALOPRAM OXALATE 20 MG: 10 TABLET ORAL at 08:08

## 2020-08-28 RX ADMIN — PIRFENIDONE 801 MG: 801 TABLET, FILM COATED ORAL at 09:08

## 2020-08-28 RX ADMIN — BICALUTAMIDE 50 MG: 50 TABLET, FILM COATED ORAL at 01:08

## 2020-08-28 RX ADMIN — PIRFENIDONE 801 MG: 801 TABLET, FILM COATED ORAL at 04:08

## 2020-08-28 RX ADMIN — CLOTRIMAZOLE AND BETAMETHASONE DIPROPIONATE: 10; .5 CREAM TOPICAL at 08:08

## 2020-08-28 RX ADMIN — HUMAN INSULIN 45 UNITS: 100 INJECTION, SUSPENSION SUBCUTANEOUS at 09:08

## 2020-08-28 RX ADMIN — LEVOTHYROXINE SODIUM 75 MCG: 25 TABLET ORAL at 05:08

## 2020-08-28 RX ADMIN — VANCOMYCIN HYDROCHLORIDE 2000 MG: 5 INJECTION, POWDER, LYOPHILIZED, FOR SOLUTION INTRAVENOUS at 03:08

## 2020-08-28 RX ADMIN — APIXABAN 2.5 MG: 2.5 TABLET, FILM COATED ORAL at 08:08

## 2020-08-28 RX ADMIN — PREGABALIN 150 MG: 75 CAPSULE ORAL at 09:08

## 2020-08-28 RX ADMIN — PREDNISONE 40 MG: 20 TABLET ORAL at 08:08

## 2020-08-28 RX ADMIN — HUMAN INSULIN 45 UNITS: 100 INJECTION, SUSPENSION SUBCUTANEOUS at 08:08

## 2020-08-28 NOTE — PLAN OF CARE
08/27/20 1933   Patient Assessment/Suction   Level of Consciousness (AVPU) alert   Respiratory Effort Normal;Unlabored   Expansion/Accessory Muscles/Retractions expansion symmetric;no retractions;no use of accessory muscles   All Lung Fields Breath Sounds diminished;clear;equal bilaterally   PRE-TX-O2   O2 Device (Oxygen Therapy) BiPAP   Oxygen Concentration (%) 80   SpO2 (!) 92 %   Pulse Oximetry Type Continuous   $ Pulse Oximetry - Multiple Charge Pulse Oximetry - Multiple   Pulse 70   Resp 19   BP (!) 121/57   Inhaler   $ Inhaler Charges PRN treatment not required   Preset CPAP/BiPAP Settings   Mode Of Delivery BiPAP   $ Is patient using? Yes   Size of Mask Large   Sized Appropriately? Yes   Equipment Type V60   Airway Device Type large full face mask   Ipap 18   EPAP (cm H2O) 10   Pressure Support (cm H2O) 8   Set Rate (Breaths/Min) 18   ITime (sec) 1   Rise Time (sec) 2   Patient CPAP/BiPAP Settings   RR Total (Breaths/Min) 19   Tidal Volume (mL) 900   VE Minute Ventilation (L/min) 18.5 L/min   Peak Inspiratory Pressure (cm H2O) 19   TiTOT (%) 38   Total Leak (L/Min) 89   Patient Trigger - ST Mode Only (%) 22   CPAP/BiPAP Alarms   High Pressure (cm H2O) 40   Low Pressure (cm H2O) 10   Minute Ventilation (L/Min) 2   High RR (breaths/min) 40   Low RR (breaths/min) 8   Apnea (Sec) 20   Respiratory Evaluation   $ Care Plan Tech Time 15 min   Evaluation For   (careplan)

## 2020-08-28 NOTE — PROGRESS NOTES
On license of [facility] Medicine  Progress Note    Patient name: Manuel Casas  MRN: [id number]  Admit Date: 8/26/2020   LOS: 1 day     SUBJECTIVE:     Principal problem: Pneumonia    Interval History:  Patient was seen and examined bedside.  Patient reports significant improvement in his overall health especially breathing.  He was transitioned to Vapotherm however still requires significant FiO2(90%).  Otherwise hemodynamically stable.  Blood cultures are growing gram-negative rods.     Scheduled Meds:   apixaban  2.5 mg Oral BID    bicalutamide  50 mg Oral Daily    ceFEPime (MAXIPIME) IVPB  2 g Intravenous Q12H    clotrimazole-betamethasone 1-0.05%   Topical (Top) BID    doxycycline  100 mg Oral Q12H    escitalopram oxalate  20 mg Oral Daily    famotidine  20 mg Oral BID    insulin NPH  45 Units Subcutaneous BID    levothyroxine  75 mcg Oral Before breakfast    montelukast  10 mg Oral QHS    pirfenidone  801 mg Oral TID    predniSONE  40 mg Oral Daily    pregabalin  150 mg Oral QHS     Continuous Infusions:  PRN Meds:acetaminophen, acetaminophen, albuterol, bisacodyL, dextrose 50%, dextrose 50%, diphenhydrAMINE, glucagon (human recombinant), glucose, glucose, insulin aspart U-100, magnesium oxide, magnesium oxide, ondansetron, potassium chloride, potassium chloride, potassium chloride, potassium, sodium phosphates, potassium, sodium phosphates, potassium, sodium phosphates, promethazine (PHENERGAN) IVPB, sodium chloride 0.9%    Review of patient's allergies indicates:  No Known Allergies    Review of Systems: As per interval history    OBJECTIVE:     Vital Signs (Most Recent)  Temp: 98 °F (36.7 °C) (08/28/20 1200)  Pulse: 70 (08/28/20 1458)  Resp: 18 (08/28/20 1458)  BP: 108/60 (08/28/20 1404)  SpO2: (!) 92 % (08/28/20 1458)    Vital Signs Range (Last 24H):  Temp:  [97.9 °F (36.6 °C)-98.1 °F (36.7 °C)]   Pulse:  [69-93]   Resp:  [10-28]   BP: ()/(50-66)   SpO2:  [89 %-98 %]     I &  O (Last 24H):    Intake/Output Summary (Last 24 hours) at 8/28/2020 1606  Last data filed at 8/28/2020 1400  Gross per 24 hour   Intake 3915 ml   Output 2170 ml   Net 1745 ml       Physical Exam:  General:  Sitting in the chair. Appears as stated age. Calm, obese  Eyes: No conjunctival injection. No scleral icterus.  ENT: Hearing grossly intact. No discharge from ears. No nasal discharge.   Neck: Supple, trachea midline. No JVD  CVS: RRR. No LE edema BL  Lungs:  Remains on Vapotherm with 90% FiO2, bilateral basal crackles noted.  No accessory muscle use.   Abdomen:  Soft, nontender and nondistended.  No organomegaly  Neuro: AOx3. Moves all extremities. Follows commands. 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    Active Hospital Problems    Diagnosis  POA    *Pneumonia [J18.9]  Yes    Gram-negative bacteremia [R78.81]  Yes    Sepsis [A41.9]  Yes    Hypothyroidism [E03.9]  Yes    Diabetes mellitus [E11.9]  Yes    Acute on chronic respiratory failure with hypoxia [J96.21]  Yes    Thrombocytopenia [D69.6]  Yes    Atrial flutter [I48.92]  Yes    Presence of cardiac pacemaker [Z95.0]  Yes    Pulmonary fibrosis [J84.10]  Yes     · UIP on ESBRIET since about 2015  · PFT (11/19) - TLC - 74%, DLCO - 80%      Obesity with body mass  index 30 or greater [E66.9]  Yes      Resolved Hospital Problems    Diagnosis Date Resolved POA    Sleep apnea [G47.30] 08/28/2020 Yes         Plan:   Overall clinical picture is consistent with sepsis due to Gram-negative bacterial pneumonia in the background of lung fibrosis and chronic hypoxic respiratory failure. Still remains on significant oxygen via Vapotherm.  Blood cultures are growing gram-negative rods.  Dr. Rdz discontinued vancomycin    Continue cefepime and doxycycline    Wean oxygen as tolerated    Continue p.o. steroids    BiPAP q.h.s.    Accu-Cheks, sliding scale insulin    Continue apixaban at lower dose.    Bronchodilators as needed    Follow-up on cultures    Follow-up on 2D echo    Stool studies    Daily chest x-ray    Continue current orders      VTE Risk Mitigation (From admission, onward)         Ordered     apixaban tablet 2.5 mg  2 times daily      08/27/20 0952     Reason for No Pharmacological VTE Prophylaxis  Once     Question:  Reasons:  Answer:  Risk of Bleeding    08/27/20 0341     IP VTE HIGH RISK PATIENT  Once      08/27/20 0341     Place sequential compression device  Until discontinued      08/27/20 0341                  Department Hospital Medicine  Critical access hospital  Kael Keith MD  Date of service: 08/28/2020

## 2020-08-28 NOTE — PROGRESS NOTES
"Pulmonary/Critical Care Medicine  Progress Note      Patient name: Manuel Casas  MRN: [id number]  Date: 08/28/2020    Admit Date: 8/26/2020  Consult Requested By: Kael Keith MD    Reason for Consult: REspiratory failure, pneumonia, pulm fibrosis    HPI:    8/27/2020 - 88 yo male who I met 2 days ago has h/o pulmonary fibrosis (fairly mild but progressive, on ESBRIET, home O2) had recent respiratory illness treated at Urgent Care and when I saw him he was stablle but with some increased dyspnea and I placed him on a short prednisone taper.  Yesterday during the day he felt well, SOB was better and he was active.  That evening he had an episode of chills and rigors and coughed up some bloody sputum.  He then began to develop fever and came to ER.  Covid test was negative and xrays show a RLL infiltrate c/w CAP.  He has needed BiPAP with 100% O2 and is now admitted for further treatment.  He states that he would prefer to not be intubated unless "absolutely necessary".  ROS as below.  He has not had any corona virus exposures and has been practicing social distancing.    8/28/2020:  No major issues overnight.  Subjectively improved and without any new complaints.    Review of Systems    Review of Systems   Constitutional: Negative for chills, fever and malaise/fatigue.   Respiratory: Positive for cough, sputum production and shortness of breath. Negative for wheezing.    Cardiovascular: Negative for chest pain, leg swelling and PND.   Genitourinary: Negative for dysuria, frequency and urgency.   Musculoskeletal: Positive for myalgias.   Psychiatric/Behavioral: The patient is not nervous/anxious.      Past Medical History    Past Medical History:   Diagnosis Date    Atrial flutter     Cancer     prostate    Congestive heart failure     Diabetes mellitus, type 2     Heart failure     right sided    Hyperlipidemia     Pulmonary fibrosis     Sleep apnea        Past Surgical History    Past Surgical History: "   Procedure Laterality Date    ADENOIDECTOMY      CARDIAC PACEMAKER PLACEMENT      HERNIA REPAIR      left, umbilical    TONSILLECTOMY         Medications (scheduled):      apixaban  2.5 mg Oral BID    bicalutamide  50 mg Oral Daily    ceFEPime (MAXIPIME) IVPB  2 g Intravenous Q12H    clotrimazole-betamethasone 1-0.05%   Topical (Top) BID    doxycycline  100 mg Oral Q12H    escitalopram oxalate  20 mg Oral Daily    famotidine  20 mg Oral BID    insulin NPH  45 Units Subcutaneous BID    levothyroxine  75 mcg Oral Before breakfast    montelukast  10 mg Oral QHS    pirfenidone  801 mg Oral TID    predniSONE  40 mg Oral Daily    pregabalin  150 mg Oral QHS       Medications (infusions):         Medications (prn):     acetaminophen, acetaminophen, albuterol, bisacodyL, dextrose 50%, dextrose 50%, diphenhydrAMINE, glucagon (human recombinant), glucose, glucose, insulin aspart U-100, magnesium oxide, magnesium oxide, ondansetron, potassium chloride, potassium chloride, potassium chloride, potassium, sodium phosphates, potassium, sodium phosphates, potassium, sodium phosphates, promethazine (PHENERGAN) IVPB, sodium chloride 0.9%    Family History:   Family History   Problem Relation Age of Onset    Heart disease Mother     Diabetes Mother     Hypertension Mother        Social History: Tobacco:   Social History     Tobacco Use   Smoking Status Former Smoker                                EtOH:   Social History     Substance and Sexual Activity   Alcohol Use None                                Drugs:   Social History     Substance and Sexual Activity   Drug Use Not on file                                Occupation: retired urologist                             Asbestos exposure: no    Physical Exam    Vital signs:  Temp:  [97.9 °F (36.6 °C)-98.3 °F (36.8 °C)]   Pulse:  [69-93]   Resp:  [12-28]   BP: ()/(50-66)   SpO2:  [89 %-98 %]     Intake/Output:     Intake/Output Summary (Last 24 hours) at  "8/28/2020 1028  Last data filed at 8/28/2020 0600  Gross per 24 hour   Intake 3095 ml   Output 2170 ml   Net 925 ml        BMI: Estimated body mass index is 35.21 kg/m² as calculated from the following:    Height as of this encounter: 6' 4" (1.93 m).    Weight as of this encounter: 131.2 kg (289 lb 3.9 oz).    Physical Exam   Constitutional: He is oriented to person, place, and time. No distress.   Obese male, wearing BiPAP   HENT:   Head: Normocephalic and atraumatic.   Right Ear: External ear normal.   Left Ear: External ear normal.   Mouth/Throat: Oropharynx is clear and moist.   Wearing BiPAP   Eyes: Pupils are equal, round, and reactive to light. Conjunctivae are normal. Right eye exhibits no discharge. Left eye exhibits no discharge. No scleral icterus.   Neck: Normal range of motion. Neck supple. No JVD present. No tracheal deviation present. No thyromegaly present.   Cardiovascular: Normal rate, regular rhythm, normal heart sounds and intact distal pulses. Exam reveals no gallop and no friction rub.   No murmur heard.  Pulmonary/Chest: Effort normal. No stridor. No respiratory distress. He has no wheezes. He has rales (few posterior rales).   BiPAP  + bnronchial BS RLL   Abdominal: Soft. Bowel sounds are normal. He exhibits no distension. There is no abdominal tenderness. There is no rebound.   obese   Musculoskeletal: Normal range of motion.         General: Edema present. No tenderness.   Lymphadenopathy:     He has no cervical adenopathy.   Neurological: He is alert and oriented to person, place, and time. GCS score is 15.   Some generalized weakness   Skin: Skin is warm and dry. No rash noted. He is not diaphoretic. No erythema.   Psychiatric: Mood, memory, affect and judgment normal.   Nursing note and vitals reviewed.      Laboratory    Recent Labs   Lab 08/28/20 0413   WBC 15.51*   RBC 4.32*   HGB 14.1   HCT 42.2   PLT 92*   MCV 98   MCH 32.6*   MCHC 33.4       Recent Labs   Lab 08/28/20 0413 "   CALCIUM 8.6*   PROT 5.3*      K 4.2   CO2 26      BUN 30*   CREATININE 1.2   ALKPHOS 41*   ALT 24   AST 18   BILITOT 1.8*       No results for input(s): PT, INR, APTT in the last 24 hours.    No results for input(s): CPK, CPKMB, TROPONINI, MB in the last 24 hours.    Additional labs:     LA - 8 -- 4.8    Procalcitonin - 0.05    Covid - neg    UA - noted    Microbiology:       Microbiology Results (last 7 days)     Procedure Component Value Units Date/Time    Blood culture x two cultures. Draw prior to antibiotics. [[id number]]  (Abnormal) Collected: 08/27/20 0030    Order Status: Completed Specimen: Blood from Peripheral, Forearm, Left Updated: 08/28/20 0828     Blood Culture, Routine Gram stain aer bottle: Gram negative rods      Results called to and read back by: Oriana Paz RN in MICU by GARIMA      08/27/2020  11:27      GRAM NEGATIVE PASHA  For susceptibility see order #[id number]      Narrative:      Aerobic and anaerobic    Blood culture x two cultures. Draw prior to antibiotics. [[id number]]  (Abnormal) Collected: 08/27/20 0026    Order Status: Completed Specimen: Blood from Peripheral, Forearm, Right Updated: 08/28/20 0826     Blood Culture, Routine Gram stain aer bottle: Gram negative rods      Results called to and read back by: Oriana Paz RN in MICU by DRCHARLEE      08/27/2020  11:27      GRAM NEGATIVE PASHA  Identification and susceptibility pending      Narrative:      Aerobic and anaerobic    Blood culture [[id number]] Collected: 08/28/20 0413    Order Status: Sent Specimen: Blood Updated: 08/28/20 0420          Radiology    Imaging Results          CT Chest Abdomen Pelvis With Contrast (Final result)  Result time 08/27/20 01:26:00    Final result by Zulma Cruz MD (08/27/20 01:26:00)                 Narrative:    EXAM:  CT Chest, Abdomen and Pelvis With Intravenous Contrast    CLINICAL HISTORY:  The patient is 87 years old and is Male; Sepsis    TECHNIQUE:  Axial computed tomography images of the  chest, abdomen and pelvis with intravenous contrast.  Sagittal and coronal reformatted images were created and reviewed.  This CT exam was performed using one or more of the following dose reduction techniques:  automated exposure control, adjustment of the mA and/or kV according to patient size, and/or use of iterative reconstruction technique.    COMPARISON:  No relevant prior studies available.    FINDINGS:  CHEST:  LUNGS:  Patchy area of groundglass opacity within the right lower lobe with interlobular septal thickening is present. Dependent atelectasis within the lung bases is also noted.  PLEURAL SPACE:  Small moderate left pleural effusion is present.  No pneumothorax.  HEART:  No cardiomegaly.  No pericardial effusion.  MEDIASTINUM:  The tracheobronchial tree is widely patent.    ABDOMEN:  LIVER:  Unremarkable.  No mass.  GALLBLADDER AND BILE DUCTS:  No calcified stones.  No ductal dilation.  PANCREAS:  No ductal dilation.  No mass.  SPLEEN:  Unremarkable.  ADRENALS:  Unremarkable.  No mass.  KIDNEYS AND URETERS:  Unremarkable. The kidneys enhance symmetrically. No obstructing renal or ureteral calculus is seen. No hydronephrosis or hydroureter. No perinephric fluid or stranding.  STOMACH AND BOWEL:  The stomach is well distended with food contents. The small bowel is normal in caliber. Stool is present throughout the colon. Scattered colonic diverticula are noted without surrounding inflammation. There is no bowel obstruction.    PELVIS:  APPENDIX:  No findings to suggest acute appendicitis.  BLADDER:  The bladder is nearly empty.  REPRODUCTIVE:  Unremarkable as visualized.    CHEST, ABDOMEN and PELVIS:  INTRAPERITONEAL SPACE:  Unremarkable.  No significant fluid collection.  No free air.  BONES/JOINTS:  Multilevel degenerative change of the spine is present.  SOFT TISSUES:  Postsurgical change of the left inguinal region is present.  VASCULATURE:  Atherosclerosis of the vasculature is present.  No aortic  aneurysm.  LYMPH NODES:  Shotty mediastinal lymph nodes are present.  TUBES, LINES AND DEVICES:  A dual-lead left-sided pacemaker is present.    IMPRESSION:  1.  Left pleural effusion with associated bibasilar atelectasis and infectious process within the right lower lobe.  2.  Colonic diverticulosis.    Electronically signed by:  Zulma Cruz MD  8/27/2020 3:01 AM CDT Workstation: [id number]                             X-Ray Chest AP Portable (Final result)  Result time 08/27/20 00:39:02    Final result by Eduin Lam MD (08/27/20 00:39:02)                 Narrative:    HISTORY: Fever, cough, hemoptysis, suspected Covid 19 virus infection.    FINDINGS: Portable chest radiograph at 0041 hours compared to  07/15/2015 shows dual lead left subclavian CCD with distal lead tips  unchanged in position in the right atrium and right ventricle. The  cardiomediastinal silhouette and pulmonary vasculature are normal,  with aortic vascular calcifications.    The lungs are symmetrically expanded, with scattered reticulonodular  and groundglass opacities in both lower lungs, right greater than  left. There is no dense consolidation, large pleural effusion, or  evidence of pulmonary edema. No pneumothorax or acute osseous  abnormality.    IMPRESSION: Scattered bilateral lower lung interstitial opacities  right greater than left, which could reflect Covid 19 pneumonia in the  appropriate clinical setting.    Electronically Signed by Eduin SMALLS on 8/27/2020 6:51 AM                              Additional Studies    EKG (8/27)  Vent. Rate : 070 BPM     Atrial Rate : 119 BPM      P-R Int : 000 ms          QRS Dur : 124 ms       QT Int : 402 ms       P-R-T Axes : 000 -88 083 degrees      QTc Int : 434 ms     Ventricular-paced rhythm   Abnormal ECG   No previous ECGs available    Ventilator Information    Oxygen Concentration (%):  [80-95] 90         Recent Labs     08/27/20  0532   PH 7.409   PCO2 29.7*   PO2 59*   HCO3 18.8*    POCSATURATED 91*   BE -6         Impression    Active Hospital Problems    Diagnosis  POA    *Sepsis [A41.9]  Yes    Pneumonia [J18.9]  Yes    Hypothyroidism [E03.9]  Yes    Diabetes mellitus [E11.9]  Yes    Acute on chronic respiratory failure with hypoxia [J96.21]  Yes    Thrombocytopenia [D69.6]  Yes    Atrial flutter [I48.92]  Yes    Presence of cardiac pacemaker [Z95.0]  Yes    Sleep apnea [G47.30]  Yes    Pulmonary fibrosis [J84.10]  Yes     · UIP on ESBRIET since about 2015  · PFT (11/19) - TLC - 74%, DLCO - 80%      Obesity with body mass index 30 or greater [E66.9]  Yes      Resolved Hospital Problems   No resolved problems to display.       Plan  · Continue cefepime and de-escalate as culture data becomes available.  · Transition to humidified HFNC.  Continue NIPPV prn.  · Continue oral steroids  · Continue ESBRIET  · Continue ELIQUIS And watch for further hemoptysis  · Monitor platelets  · ICU for now    Dr. Brar will follow over the weekend.    Critical Care Time  I have spent > 35 minutes providing critical care services for this pt for the above diagnoses.  These services have included pt evaluation, pt exam, BiPAP/oxygenation assessment, discussions with staff, chart review, data review, note preparation and .  The patient has life threatening illness with a high risk of decompensation and/or death.    Robin Rdz MD  [facility]  08/28/2020  10:32 AM

## 2020-08-28 NOTE — PLAN OF CARE
08/28/20 0733   Patient Assessment/Suction   Level of Consciousness (AVPU) alert  (asleep)   Respiratory Effort Normal;Unlabored   Expansion/Accessory Muscles/Retractions expansion symmetric;no use of accessory muscles   All Lung Fields Breath Sounds Anterior:;clear   Rhythm/Pattern, Respiratory assisted mechanically;pattern regular   PRE-TX-O2   O2 Device (Oxygen Therapy) BiPAP   $ Is the patient on Low Flow Oxygen? Yes   SpO2 (!) 91 %   Pulse Oximetry Type Continuous   $ Pulse Oximetry - Multiple Charge Pulse Oximetry - Multiple   Pulse 70   Resp 14   Positioning HOB elevated 45 degrees   Inhaler   $ Inhaler Charges PRN treatment not required   Daily Review of Necessity (Inhaler) completed   Preset CPAP/BiPAP Settings   Mode Of Delivery BiPAP S/T   $ CPAP/BiPAP Daily Charge BiPAP/CPAP Daily   $ Initial CPAP/BiPAP Setup? No   $ Is patient using? Yes   Size of Mask Large   Sized Appropriately? Yes   Equipment Type V60   Airway Device Type large full face mask   Ipap 20   EPAP (cm H2O) 10   Pressure Support (cm H2O) 10   Set Rate (Breaths/Min) 18   ITime (sec) 1   Rise Time (sec) 2   Patient CPAP/BiPAP Settings   RR Total (Breaths/Min) 20   Tidal Volume (mL) 934   VE Minute Ventilation (L/min) 23.3 L/min   Peak Inspiratory Pressure (cm H2O) 20   TiTOT (%) 36   Total Leak (L/Min) 58   Patient Trigger - ST Mode Only (%) 30   CPAP/BiPAP Backup Settings   IPAP Backup 20 cmH2O   EPAP Backup 10 cmH2O   Backup Rate 18 breaths per minute (bpm)   FIO2 Backup 100 %   ITIME Backup 1 seconds   Rise Time Backup 2 seconds   CPAP/BiPAP Alarms   High Pressure (cm H2O) 40   Low Pressure (cm H2O) 10   Low Pressure Delay (Sec) 18   Minute Ventilation (L/Min) 2   High RR (breaths/min) 40   Low RR (breaths/min) 8   Apnea (Sec) 20

## 2020-08-28 NOTE — CARE UPDATE
08/28/20 0960   Patient Assessment/Suction   Level of Consciousness (AVPU) alert   Respiratory Effort Normal;Unlabored   Expansion/Accessory Muscles/Retractions expansion symmetric   All Lung Fields Breath Sounds diminished   Rhythm/Pattern, Respiratory assisted mechanically   PRE-TX-O2   O2 Device (Oxygen Therapy) Vapotherm   Humidification temp set 33   Flow (L/min) 40   Oxygen Concentration (%) 90   SpO2 (!) 94 %   Pulse Oximetry Type Continuous   Pulse 70   Resp 18   Positioning Sitting in chair   Preset CPAP/BiPAP Settings   Mode Of Delivery Standby  (pt placed on vapotherm)

## 2020-08-28 NOTE — PROGRESS NOTES
"[facility]  Adult Nutrition   Progress Note (Follow-Up)    SUMMARY     Recommendations  Recommendation/Intervention: 1. Clear liquid; Diabetic diet started. Encourage PO intake and recommend diet advancement as tolerated/ as medically able. 2.  Recommend continued monitoring and management of blood glucose and electrolytes.  Goals: 1. Diet to be advanced to diabetic diet. 2. Patient to meet at least 75% of estimated energy and protein needs. 3. Blood glucose and electrolytes to trend towards normal limits/ target ranges.  Nutrition Goal Status: new  Communication of RD Recs: reviewed with RN    Dietitian Rounds Brief  Patient transitioned to vapotherm. Clear liquid, diabetic diet started. No intake yet.  Will monitor PO intake, tolerance and ability for diet advancement.    Reason for Assessment  Reason For Assessment: RD follow-up  Relevant Medical History: sleep apnea, sepsis, pneumonia, diabetes mellitis, CHF, pulmonary fibrosis  Interdisciplinary Rounds: attended    Nutrition Risk Screen  Nutrition Risk Screen: no indicators present    Nutrition/Diet History  Food Allergies: NKFA  Factors Affecting Nutritional Intake: clear liquid diet    Anthropometrics  Temp: 98.1 °F (36.7 °C)  Height Method: Stated  Height: 6' 4" (193 cm)  Height (inches): 76 in  Weight Method: Bed Scale  Weight: 131.2 kg (289 lb 3.9 oz)  Weight (lb): 289.25 lb  Ideal Body Weight (IBW), Male: 202 lb  % Ideal Body Weight, Male (lb): 143.19 %  BMI (Calculated): 35.2  BMI Grade: 35 - 39.9 - obesity - grade II       Weight History:  Wt Readings from Last 10 Encounters:   08/27/20 131.2 kg (289 lb 3.9 oz)   08/24/20 (P) 130.2 kg (287 lb)   09/23/19 124.7 kg (275 lb)   05/16/19 124.7 kg (275 lb)       Lab/Procedures/Meds: Pertinent Labs Reviewed    Clinical Chemistry:  Recent Labs   Lab 08/27/20  0024 08/28/20  0413    137   K 4.1 4.2   CL 99 103   CO2 23 26   * 169*   BUN 26* 30*   CREATININE 1.4 1.2   CALCIUM 9.5 8.6* "   PROT 6.8 5.3*   ALBUMIN 4.7 3.5   BILITOT 1.4* 1.8*   ALKPHOS 80 41*   AST 33 18   ALT 39 24   ANIONGAP 18* 8   ESTGFRAFRICA 51.9* >60.0   EGFRNONAA 44.9* 54.0*   MG 1.6 1.8   PHOS  --  3.1   LIPASE 48  --        CBC:   Recent Labs   Lab 08/28/20  0413   WBC 15.51*   RBC 4.32*   HGB 14.1   HCT 42.2   PLT 92*   MCV 98   MCH 32.6*   MCHC 33.4       Cardiac Profile:  Recent Labs   Lab 08/27/20  0024   *   CPK 55   TROPONINI 0.036       Inflammatory Labs:  Recent Labs   Lab 08/27/20  0024   CRP 0.25       Medications: Pertinent Medications reviewed    Scheduled Meds:   apixaban  2.5 mg Oral BID    bicalutamide  50 mg Oral Daily    ceFEPime (MAXIPIME) IVPB  2 g Intravenous Q12H    clotrimazole-betamethasone 1-0.05%   Topical (Top) BID    doxycycline  100 mg Oral Q12H    escitalopram oxalate  20 mg Oral Daily    famotidine  20 mg Oral BID    insulin NPH  45 Units Subcutaneous BID    levothyroxine  75 mcg Oral Before breakfast    montelukast  10 mg Oral QHS    pirfenidone  801 mg Oral TID    predniSONE  40 mg Oral Daily    pregabalin  150 mg Oral QHS       Continuous Infusions:    PRN Meds:.acetaminophen, acetaminophen, albuterol, bisacodyL, dextrose 50%, dextrose 50%, diphenhydrAMINE, glucagon (human recombinant), glucose, glucose, insulin aspart U-100, magnesium oxide, magnesium oxide, ondansetron, potassium chloride, potassium chloride, potassium chloride, potassium, sodium phosphates, potassium, sodium phosphates, potassium, sodium phosphates, promethazine (PHENERGAN) IVPB, sodium chloride 0.9%    Estimated/Assessed Needs  Weight Used For Calorie Calculations: 131.2 kg (289 lb 3.9 oz)  Energy Calorie Requirements (kcal): 1443 - 1837 (11 - 14 kcal/kg)  Energy Need Method: Kcal/kg  Protein Requirements: 138 (1.5 g/kg IBW)  Weight Used For Protein Calculations: 92 kg (202 lb 13.2 oz)(IBW)  Fluid Requirements (mL): 3280 (25 ml/kg) or per MD  Estimated Fluid Requirement Method: other (see  comments)  RDA Method (mL): 1443       Nutrition Prescription Ordered  Current Diet Order: Clear liquid diet    Evaluation of Received Nutrient/Fluid Intake  Energy Calories Required: not meeting needs  Protein Required: not meeting needs  Fluid Required: meeting needs  Comments: Patient transitioned to vapotherm. Clear liquid, diabetic diet started. No intake yet.  Will monitor PO intake, tolerance and ability for diet advancement.  Tolerance: tolerating     Intake/Output Summary (Last 24 hours) at 8/28/2020 1139  Last data filed at 8/28/2020 0600  Gross per 24 hour   Intake 3095 ml   Output 2170 ml   Net 925 ml      % Intake of Estimated Energy Needs: 0%  % Meal Intake: 0 - 25 %    Nutrition Risk  Level of Risk/Frequency of Follow-up: high     Monitor and Evaluation  Food and Nutrient Intake: energy intake  Food and Nutrient Adminstration: diet order  Physical Activity and Function: nutrition-related ADLs and IADLs, factors affecting access to physical activity  Anthropometric Measurements: weight, weight change, body mass index  Biochemical Data, Medical Tests and Procedures: electrolyte and renal panel, inflammatory profile, gastrointestinal profile, lipid profile, glucose/endocrine profile  Nutrition-Focused Physical Findings: overall appearance     Nutrition Follow-Up  RD Follow-up?: Yes     Kala King RD 08/28/2020 11:40 AM

## 2020-08-28 NOTE — PHYSICIAN QUERY
PT Name: Manuel Casas  MR #: [id number]     Perfusion Diagnosis Clarification     CDS/: Marycarmen Tolentino Pe               Contact information: [phone]   This form is a permanent document in the medical record.     Query Date: 2020    By submitting this query, we are merely seeking further clarification of documentation.  Please utilize your independent clinical judgment when addressing the question(s) below.  The Medical Record contains the followin/27 - PN - Not in shock anymore.  - with bacterial pneumonia, bacteremia, sepsis  Blood Cxs Positive for GNR /  Lactic Acid  8.0  VS - Temp 104.9, RR 34, BP 91/50  Tmt - [employer] IV Abx, IVF        Provider, please specify the type of shock associated with above clinical findings.    Please, also include the documentation in your progress notes daily for the duration of treatment and include in your discharge summary as well.     [    ] Septic Shock   [    ] Cardiogenic Shock   [    ] Hemorrhagic Shock   [    ] Hypovolemic Shock   [    ] Anaphylactic Shock   [    ] Neurogenic Shock   [    ] Drug and toxin-induced Shock   [    ] Other Shock (please specify): __________   [    ] Shock, Unspecified   [    ] Other Condition (please specify): _________   [ x ] Clinically Undetermined     Present on admission (POA) status:   [   ] Yes (Y)                [ x ] Clinically Undetermined (W)           [   ] No (N)                  [   ] Documentation insufficient to determine if condition is POA (U)   Please document in your progress notes daily for the duration of treatment until resolved and include in your discharge summary.

## 2020-08-28 NOTE — PHYSICIAN QUERY
PT Name: Manuel Casas  MR #: [id number]    Consultant Diagnosis Clarification     CDS/: Marycarmen Tolentino Pe               Contact information: [phone]   This form is a permanent document in the medical record.    Query Date: August 28, 2020      By submitting this query, we are merely seeking further clarification of documentation.  Please utilize your independent clinical judgment when addressing the question(s) below.    The Medical Record reflects the following:  Documentation per Pulmonary Consult - Acute on chronic respiratory failure with hypoxia     Please clarify/confirm the Consultants diagnosis of : Acute on Chronic Hypoxic Respiratory Failure :  Please, also include the documentation in your progress notes daily for the duration of treatment and include in your discharge summary as well.      [x  ] Diagnosis ruled in   [  ] Diagnosis ruled in, but it resolved prior to my assessment of the patient   [  ] Diagnosis ruled out   [  ] Other diagnosis (please specify): _____________________________   [  ] Clinically undetermined     Present on admission (POA) status:   [   ] Yes (Y)                          [  ] Clinically Undetermined (W)  [   ] No (N)                            [   ] Documentation insufficient to determine if condition is POA (U)

## 2020-08-29 LAB
ANION GAP SERPL CALC-SCNC: 8 MMOL/L (ref 8–16)
BASOPHILS # BLD AUTO: 0.03 K/UL (ref 0–0.2)
BASOPHILS NFR BLD: 0.2 % (ref 0–1.9)
BUN SERPL-MCNC: 29 MG/DL (ref 8–23)
CALCIUM SERPL-MCNC: 9.1 MG/DL (ref 8.7–10.5)
CHLORIDE SERPL-SCNC: 106 MMOL/L (ref 95–110)
CO2 SERPL-SCNC: 25 MMOL/L (ref 23–29)
CREAT SERPL-MCNC: 1.1 MG/DL (ref 0.5–1.4)
DIFFERENTIAL METHOD: ABNORMAL
EOSINOPHIL # BLD AUTO: 0.1 K/UL (ref 0–0.5)
EOSINOPHIL NFR BLD: 0.3 % (ref 0–8)
ERYTHROCYTE [DISTWIDTH] IN BLOOD BY AUTOMATED COUNT: 13.9 % (ref 11.5–14.5)
EST. GFR  (AFRICAN AMERICAN): >60 ML/MIN/1.73 M^2
EST. GFR  (NON AFRICAN AMERICAN): >60 ML/MIN/1.73 M^2
GLUCOSE SERPL-MCNC: 201 MG/DL (ref 70–110)
GLUCOSE SERPL-MCNC: 78 MG/DL (ref 70–110)
GLUCOSE SERPL-MCNC: 79 MG/DL (ref 70–110)
HCT VFR BLD AUTO: 41.9 % (ref 40–54)
HGB BLD-MCNC: 14.1 G/DL (ref 14–18)
IMM GRANULOCYTES # BLD AUTO: 0.37 K/UL (ref 0–0.04)
IMM GRANULOCYTES NFR BLD AUTO: 2.2 % (ref 0–0.5)
LYMPHOCYTES # BLD AUTO: 5.6 K/UL (ref 1–4.8)
LYMPHOCYTES NFR BLD: 33.7 % (ref 18–48)
MAGNESIUM SERPL-MCNC: 1.9 MG/DL (ref 1.6–2.6)
MCH RBC QN AUTO: 32.9 PG (ref 27–31)
MCHC RBC AUTO-ENTMCNC: 33.7 G/DL (ref 32–36)
MCV RBC AUTO: 98 FL (ref 82–98)
MONOCYTES # BLD AUTO: 0.8 K/UL (ref 0.3–1)
MONOCYTES NFR BLD: 4.6 % (ref 4–15)
NEUTROPHILS # BLD AUTO: 9.8 K/UL (ref 1.8–7.7)
NEUTROPHILS NFR BLD: 59 % (ref 38–73)
NRBC BLD-RTO: 0 /100 WBC
PHOSPHATE SERPL-MCNC: 2.3 MG/DL (ref 2.7–4.5)
PLATELET # BLD AUTO: 99 K/UL (ref 150–350)
PMV BLD AUTO: 11.1 FL (ref 9.2–12.9)
POTASSIUM SERPL-SCNC: 3.8 MMOL/L (ref 3.5–5.1)
PROCALCITONIN SERPL IA-MCNC: 4.47 NG/ML (ref 0–0.5)
RBC # BLD AUTO: 4.28 M/UL (ref 4.6–6.2)
SODIUM SERPL-SCNC: 139 MMOL/L (ref 136–145)
WBC # BLD AUTO: 16.67 K/UL (ref 3.9–12.7)

## 2020-08-29 PROCEDURE — 20000000 HC ICU ROOM

## 2020-08-29 PROCEDURE — 36415 COLL VENOUS BLD VENIPUNCTURE: CPT

## 2020-08-29 PROCEDURE — 85025 COMPLETE CBC W/AUTO DIFF WBC: CPT

## 2020-08-29 PROCEDURE — 25000003 PHARM REV CODE 250: Performed by: HOSPITALIST

## 2020-08-29 PROCEDURE — 99291 PR CRITICAL CARE, E/M 30-74 MINUTES: ICD-10-PCS | Mod: ,,, | Performed by: INTERNAL MEDICINE

## 2020-08-29 PROCEDURE — 63600175 PHARM REV CODE 636 W HCPCS: Performed by: STUDENT IN AN ORGANIZED HEALTH CARE EDUCATION/TRAINING PROGRAM

## 2020-08-29 PROCEDURE — 63600175 PHARM REV CODE 636 W HCPCS: Performed by: HOSPITALIST

## 2020-08-29 PROCEDURE — 63600175 PHARM REV CODE 636 W HCPCS: Performed by: NURSE PRACTITIONER

## 2020-08-29 PROCEDURE — 27100171 HC OXYGEN HIGH FLOW UP TO 24 HOURS

## 2020-08-29 PROCEDURE — 25000003 PHARM REV CODE 250: Performed by: NURSE PRACTITIONER

## 2020-08-29 PROCEDURE — 83735 ASSAY OF MAGNESIUM: CPT

## 2020-08-29 PROCEDURE — 94761 N-INVAS EAR/PLS OXIMETRY MLT: CPT

## 2020-08-29 PROCEDURE — 87040 BLOOD CULTURE FOR BACTERIA: CPT

## 2020-08-29 PROCEDURE — 99900035 HC TECH TIME PER 15 MIN (STAT)

## 2020-08-29 PROCEDURE — 25000003 PHARM REV CODE 250: Performed by: INTERNAL MEDICINE

## 2020-08-29 PROCEDURE — 80048 BASIC METABOLIC PNL TOTAL CA: CPT

## 2020-08-29 PROCEDURE — 84145 PROCALCITONIN (PCT): CPT

## 2020-08-29 PROCEDURE — 84100 ASSAY OF PHOSPHORUS: CPT

## 2020-08-29 PROCEDURE — 99291 CRITICAL CARE FIRST HOUR: CPT | Mod: ,,, | Performed by: INTERNAL MEDICINE

## 2020-08-29 RX ORDER — FUROSEMIDE 10 MG/ML
10 INJECTION INTRAMUSCULAR; INTRAVENOUS ONCE
Status: DISCONTINUED | OUTPATIENT
Start: 2020-08-29 | End: 2020-08-29

## 2020-08-29 RX ORDER — FUROSEMIDE 10 MG/ML
20 INJECTION INTRAMUSCULAR; INTRAVENOUS ONCE
Status: COMPLETED | OUTPATIENT
Start: 2020-08-29 | End: 2020-08-29

## 2020-08-29 RX ADMIN — ESCITALOPRAM OXALATE 20 MG: 10 TABLET ORAL at 09:08

## 2020-08-29 RX ADMIN — CEFEPIME HYDROCHLORIDE 2 G: 2 INJECTION, SOLUTION INTRAVENOUS at 01:08

## 2020-08-29 RX ADMIN — HUMAN INSULIN 45 UNITS: 100 INJECTION, SUSPENSION SUBCUTANEOUS at 09:08

## 2020-08-29 RX ADMIN — PIRFENIDONE 801 MG: 801 TABLET, FILM COATED ORAL at 09:08

## 2020-08-29 RX ADMIN — CLOTRIMAZOLE AND BETAMETHASONE DIPROPIONATE: 10; .5 CREAM TOPICAL at 09:08

## 2020-08-29 RX ADMIN — DIPHENHYDRAMINE HYDROCHLORIDE 50 MG: 25 CAPSULE ORAL at 09:08

## 2020-08-29 RX ADMIN — FAMOTIDINE 20 MG: 20 TABLET ORAL at 09:08

## 2020-08-29 RX ADMIN — CLOTRIMAZOLE AND BETAMETHASONE DIPROPIONATE: 10; .5 CREAM TOPICAL at 08:08

## 2020-08-29 RX ADMIN — PREDNISONE 40 MG: 20 TABLET ORAL at 09:08

## 2020-08-29 RX ADMIN — FAMOTIDINE 20 MG: 20 TABLET ORAL at 08:08

## 2020-08-29 RX ADMIN — LEVOTHYROXINE SODIUM 75 MCG: 25 TABLET ORAL at 06:08

## 2020-08-29 RX ADMIN — PREGABALIN 150 MG: 75 CAPSULE ORAL at 08:08

## 2020-08-29 RX ADMIN — FUROSEMIDE 20 MG: 10 INJECTION, SOLUTION INTRAMUSCULAR; INTRAVENOUS at 09:08

## 2020-08-29 RX ADMIN — BICALUTAMIDE 50 MG: 50 TABLET, FILM COATED ORAL at 09:08

## 2020-08-29 RX ADMIN — DOXYCYCLINE HYCLATE 100 MG: 100 CAPSULE ORAL at 09:08

## 2020-08-29 RX ADMIN — DOXYCYCLINE HYCLATE 100 MG: 100 CAPSULE ORAL at 08:08

## 2020-08-29 RX ADMIN — PIRFENIDONE 801 MG: 801 TABLET, FILM COATED ORAL at 03:08

## 2020-08-29 NOTE — PLAN OF CARE
08/28/20 2001   Patient Assessment/Suction   Level of Consciousness (AVPU) alert   Respiratory Effort Normal;Unlabored   Expansion/Accessory Muscles/Retractions no use of accessory muscles   All Lung Fields Breath Sounds diminished  (few crackles noted on the riight)   PRE-TX-O2   O2 Device (Oxygen Therapy) Vapotherm   Humidification temp set 33   Humidification temp actual 33   Flow (L/min) 40   Oxygen Concentration (%) 90   SpO2 (!) 88 %   Pulse Oximetry Type Continuous   $ Pulse Oximetry - Multiple Charge Pulse Oximetry - Multiple   Pulse 70   Resp (!) 30   BP (!) 113/59   Inhaler   $ Inhaler Charges PRN treatment not required   Daily Review of Necessity (Inhaler) completed   Respiratory Treatment Status (Inhaler) PRN treatment not required   Ready to Wean/Extubation Screen   FIO2<=50 (chart decimal) (!) 0.9

## 2020-08-29 NOTE — PLAN OF CARE
08/29/20 0758   Patient Assessment/Suction   Level of Consciousness (AVPU) alert   Respiratory Effort Normal;Unlabored   Expansion/Accessory Muscles/Retractions no use of accessory muscles;no retractions;expansion symmetric   All Lung Fields Breath Sounds clear   Rhythm/Pattern, Respiratory unlabored;pattern regular;depth regular;chest wiggle adequate;no shortness of breath reported   Cough Frequency infrequent   Cough Type good   PRE-TX-O2   O2 Device (Oxygen Therapy) Vapotherm   $ Is the patient on High Flow Oxygen? Yes   Humidification temp set 33   Humidification temp actual 33   Flow (L/min) 40   Oxygen Concentration (%) 95   SpO2 (!) 88 %   Pulse Oximetry Type Continuous   $ Pulse Oximetry - Multiple Charge Pulse Oximetry - Multiple   Pulse 69   Resp (!) 23   Inhaler   $ Inhaler Charges PRN treatment not required   Preset CPAP/BiPAP Settings   Mode Of Delivery Standby   Respiratory Evaluation   $ Care Plan Tech Time 15 min

## 2020-08-29 NOTE — PROGRESS NOTES
"Pulmonary/Critical Care Medicine  Progress Note      Patient name: Manuel Casas  MRN: [id number]  Date: 08/29/2020    Admit Date: 8/26/2020  Consult Requested By: Kael Keith MD    Reason for Consult: REspiratory failure, pneumonia, pulm fibrosis    HPI:    8/27/2020 - 88 yo male who I met 2 days ago has h/o pulmonary fibrosis (fairly mild but progressive, on ESBRIET, home O2) had recent respiratory illness treated at Urgent Care and when I saw him he was stablle but with some increased dyspnea and I placed him on a short prednisone taper.  Yesterday during the day he felt well, SOB was better and he was active.  That evening he had an episode of chills and rigors and coughed up some bloody sputum.  He then began to develop fever and came to ER.  Covid test was negative and xrays show a RLL infiltrate c/w CAP.  He has needed BiPAP with 100% O2 and is now admitted for further treatment.  He states that he would prefer to not be intubated unless "absolutely necessary".  ROS as below.  He has not had any corona virus exposures and has been practicing social distancing.    8/28/2020:  No major issues overnight.  Subjectively improved and without any new complaints.  8/29- had hemoptysis dark red blood several times yesterday but no change in shortness of breath. No hemoptysis today. On vapotherm 40L FiO2 95%    Review of Systems    Review of Systems   Constitutional: Negative for chills, fever and malaise/fatigue.   Respiratory: Positive for cough, sputum production and shortness of breath. Negative for wheezing.    Cardiovascular: Negative for chest pain, leg swelling and PND.   Genitourinary: Negative for dysuria, frequency and urgency.   Musculoskeletal: Positive for myalgias.   Psychiatric/Behavioral: The patient is not nervous/anxious.      Past Medical History    Past Medical History:   Diagnosis Date    Atrial flutter     Cancer     prostate    Congestive heart failure     Diabetes mellitus, type 2  "    Heart failure     right sided    Hyperlipidemia     Pulmonary fibrosis     Sleep apnea        Past Surgical History    Past Surgical History:   Procedure Laterality Date    ADENOIDECTOMY      CARDIAC PACEMAKER PLACEMENT      HERNIA REPAIR      left, umbilical    TONSILLECTOMY         Medications (scheduled):      apixaban  2.5 mg Oral BID    bicalutamide  50 mg Oral Daily    ceFEPime (MAXIPIME) IVPB  2 g Intravenous Q12H    clotrimazole-betamethasone 1-0.05%   Topical (Top) BID    doxycycline  100 mg Oral Q12H    escitalopram oxalate  20 mg Oral Daily    famotidine  20 mg Oral BID    insulin NPH  45 Units Subcutaneous BID    levothyroxine  75 mcg Oral Before breakfast    montelukast  10 mg Oral QHS    pirfenidone  801 mg Oral TID    predniSONE  40 mg Oral Daily    pregabalin  150 mg Oral QHS       Medications (infusions):         Medications (prn):     acetaminophen, acetaminophen, albuterol, bisacodyL, dextrose 50%, dextrose 50%, diphenhydrAMINE, glucagon (human recombinant), glucose, glucose, insulin aspart U-100, magnesium oxide, magnesium oxide, ondansetron, potassium chloride, potassium chloride, potassium chloride, potassium, sodium phosphates, potassium, sodium phosphates, potassium, sodium phosphates, promethazine (PHENERGAN) IVPB, sodium chloride 0.9%    Family History:   Family History   Problem Relation Age of Onset    Heart disease Mother     Diabetes Mother     Hypertension Mother        Social History: Tobacco:   Social History     Tobacco Use   Smoking Status Former Smoker                                EtOH:   Social History     Substance and Sexual Activity   Alcohol Use None                                Drugs:   Social History     Substance and Sexual Activity   Drug Use Not on file                                Occupation: retired urologist                             Asbestos exposure: no    Physical Exam    Vital signs:  Temp:  [98 °F (36.7 °C)-98.1 °F (36.7 °C)]  "  Pulse:  [69-70]   Resp:  [10-30]   BP: ()/(52-77)   SpO2:  [86 %-98 %]     Intake/Output:     Intake/Output Summary (Last 24 hours) at 8/29/2020 0755  Last data filed at 8/28/2020 1900  Gross per 24 hour   Intake 870 ml   Output 850 ml   Net 20 ml        BMI: Estimated body mass index is 35.21 kg/m² as calculated from the following:    Height as of this encounter: 6' 4" (1.93 m).    Weight as of this encounter: 131.2 kg (289 lb 3.9 oz).    Physical Exam   Constitutional: He is oriented to person, place, and time. No distress.   Obese male, wearing BiPAP   HENT:   Head: Normocephalic and atraumatic.   Right Ear: External ear normal.   Left Ear: External ear normal.   Mouth/Throat: Oropharynx is clear and moist.   Wearing BiPAP   Eyes: Pupils are equal, round, and reactive to light. Conjunctivae are normal. Right eye exhibits no discharge. Left eye exhibits no discharge. No scleral icterus.   Neck: Normal range of motion. Neck supple. No JVD present. No tracheal deviation present. No thyromegaly present.   Cardiovascular: Normal rate, regular rhythm, normal heart sounds and intact distal pulses. Exam reveals no gallop and no friction rub.   No murmur heard.  Pulmonary/Chest: Effort normal. No stridor. No respiratory distress. He has no wheezes. He has rales (few posterior rales).   BiPAP  + bnronchial BS RLL   Abdominal: Soft. Bowel sounds are normal. He exhibits no distension. There is no abdominal tenderness. There is no rebound.   obese   Musculoskeletal: Normal range of motion.         General: Edema present. No tenderness.   Lymphadenopathy:     He has no cervical adenopathy.   Neurological: He is alert and oriented to person, place, and time. GCS score is 15.   Some generalized weakness   Skin: Skin is warm and dry. No rash noted. He is not diaphoretic. No erythema.   Psychiatric: Mood, memory, affect and judgment normal.   Nursing note and vitals reviewed.      Laboratory    Recent Labs   Lab " 08/29/20 0316   WBC 16.67*   RBC 4.28*   HGB 14.1   HCT 41.9   PLT 99*   MCV 98   MCH 32.9*   MCHC 33.7       No results for input(s): GLUCOSE, CALCIUM, PROT, NA, K, CO2, CL, BUN, CREATININE, ALKPHOS, ALT, AST, BILITOT in the last 24 hours.    Invalid input(s):  ALBUMIN    No results for input(s): PT, INR, APTT in the last 24 hours.    No results for input(s): CPK, CPKMB, TROPONINI, MB in the last 24 hours.    Additional labs:     LA - 8 -- 4.8    Results for JOHN SHELBY (MRN [id number]) as of 8/29/2020 07:56   Ref. Range 8/29/2020 03:16   Procalcitonin Latest Ref Range: 0.00 - 0.50 ng/mL 4.47 (H)       Covid - neg    UA - noted    Microbiology:       Microbiology Results (last 7 days)     Procedure Component Value Units Date/Time    Blood culture [[id number]] Collected: 08/28/20 0413    Order Status: Completed Specimen: Blood Updated: 08/29/20 0432     Blood Culture, Routine No Growth to date      No Growth to date    Blood culture [[id number]] Collected: 08/29/20 0316    Order Status: Sent Specimen: Blood Updated: 08/29/20 0429    Clostridium difficile EIA [[id number]] Collected: 08/28/20 2332    Order Status: Canceled Specimen: Stool     Blood culture x two cultures. Draw prior to antibiotics. [[id number]]  (Abnormal) Collected: 08/27/20 0030    Order Status: Completed Specimen: Blood from Peripheral, Forearm, Left Updated: 08/28/20 0828     Blood Culture, Routine Gram stain aer bottle: Gram negative rods      Results called to and read back by: Oriana Paz RN in MICU by GARIMA      08/27/2020  11:27      GRAM NEGATIVE PASHA  For susceptibility see order #[id number]      Narrative:      Aerobic and anaerobic    Blood culture x two cultures. Draw prior to antibiotics. [[id number]]  (Abnormal) Collected: 08/27/20 0026    Order Status: Completed Specimen: Blood from Peripheral, Forearm, Right Updated: 08/28/20 0826     Blood Culture, Routine Gram stain aer bottle: Gram negative rods      Results called to and read back by:  Oriana Paz RN in MICU by GARIMA      08/27/2020  11:27      GRAM NEGATIVE PASHA  Identification and susceptibility pending      Narrative:      Aerobic and anaerobic          Radiology    Imaging Results          CT Chest Abdomen Pelvis With Contrast (Final result)  Result time 08/27/20 01:26:00    Final result by Zulma Cruz MD (08/27/20 01:26:00)                 Narrative:    EXAM:  CT Chest, Abdomen and Pelvis With Intravenous Contrast    CLINICAL HISTORY:  The patient is 87 years old and is Male; Sepsis    TECHNIQUE:  Axial computed tomography images of the chest, abdomen and pelvis with intravenous contrast.  Sagittal and coronal reformatted images were created and reviewed.  This CT exam was performed using one or more of the following dose reduction techniques:  automated exposure control, adjustment of the mA and/or kV according to patient size, and/or use of iterative reconstruction technique.    COMPARISON:  No relevant prior studies available.    FINDINGS:  CHEST:  LUNGS:  Patchy area of groundglass opacity within the right lower lobe with interlobular septal thickening is present. Dependent atelectasis within the lung bases is also noted.  PLEURAL SPACE:  Small moderate left pleural effusion is present.  No pneumothorax.  HEART:  No cardiomegaly.  No pericardial effusion.  MEDIASTINUM:  The tracheobronchial tree is widely patent.    ABDOMEN:  LIVER:  Unremarkable.  No mass.  GALLBLADDER AND BILE DUCTS:  No calcified stones.  No ductal dilation.  PANCREAS:  No ductal dilation.  No mass.  SPLEEN:  Unremarkable.  ADRENALS:  Unremarkable.  No mass.  KIDNEYS AND URETERS:  Unremarkable. The kidneys enhance symmetrically. No obstructing renal or ureteral calculus is seen. No hydronephrosis or hydroureter. No perinephric fluid or stranding.  STOMACH AND BOWEL:  The stomach is well distended with food contents. The small bowel is normal in caliber. Stool is present throughout the colon. Scattered colonic  diverticula are noted without surrounding inflammation. There is no bowel obstruction.    PELVIS:  APPENDIX:  No findings to suggest acute appendicitis.  BLADDER:  The bladder is nearly empty.  REPRODUCTIVE:  Unremarkable as visualized.    CHEST, ABDOMEN and PELVIS:  INTRAPERITONEAL SPACE:  Unremarkable.  No significant fluid collection.  No free air.  BONES/JOINTS:  Multilevel degenerative change of the spine is present.  SOFT TISSUES:  Postsurgical change of the left inguinal region is present.  VASCULATURE:  Atherosclerosis of the vasculature is present.  No aortic aneurysm.  LYMPH NODES:  Shotty mediastinal lymph nodes are present.  TUBES, LINES AND DEVICES:  A dual-lead left-sided pacemaker is present.    IMPRESSION:  1.  Left pleural effusion with associated bibasilar atelectasis and infectious process within the right lower lobe.  2.  Colonic diverticulosis.    Electronically signed by:  Zulma Cruz MD  8/27/2020 3:01 AM CDT Workstation: [id number]                             X-Ray Chest AP Portable (Final result)  Result time 08/27/20 00:39:02    Final result by Eduin Lam MD (08/27/20 00:39:02)                 Narrative:    HISTORY: Fever, cough, hemoptysis, suspected Covid 19 virus infection.    FINDINGS: Portable chest radiograph at 0041 hours compared to  07/15/2015 shows dual lead left subclavian CCD with distal lead tips  unchanged in position in the right atrium and right ventricle. The  cardiomediastinal silhouette and pulmonary vasculature are normal,  with aortic vascular calcifications.    The lungs are symmetrically expanded, with scattered reticulonodular  and groundglass opacities in both lower lungs, right greater than  left. There is no dense consolidation, large pleural effusion, or  evidence of pulmonary edema. No pneumothorax or acute osseous  abnormality.    IMPRESSION: Scattered bilateral lower lung interstitial opacities  right greater than left, which could reflect Covid 19  pneumonia in the  appropriate clinical setting.    Electronically Signed by Eduin SMALLS on 8/27/2020 6:51 AM                              Additional Studies    EKG (8/27)  Vent. Rate : 070 BPM     Atrial Rate : 119 BPM      P-R Int : 000 ms          QRS Dur : 124 ms       QT Int : 402 ms       P-R-T Axes : 000 -88 083 degrees      QTc Int : 434 ms     Ventricular-paced rhythm   Abnormal ECG   No previous ECGs available    Ventilator Information    Oxygen Concentration (%):  [90] 90         Recent Labs     08/27/20  0532   PH 7.409   PCO2 29.7*   PO2 59*   HCO3 18.8*   POCSATURATED 91*   BE -6         Impression    Active Hospital Problems    Diagnosis  POA    *Pneumonia [J18.9]  Yes    Gram-negative bacteremia [R78.81]  Yes    Sepsis [A41.9]  Yes    Hypothyroidism [E03.9]  Yes    Diabetes mellitus [E11.9]  Yes    Acute on chronic respiratory failure with hypoxia [J96.21]  Yes    Thrombocytopenia [D69.6]  Yes    Atrial flutter [I48.92]  Yes    Presence of cardiac pacemaker [Z95.0]  Yes    Pulmonary fibrosis [J84.10]  Yes     · UIP on ESBRIET since about 2015  · PFT (11/19) - TLC - 74%, DLCO - 80%      Obesity with body mass index 30 or greater [E66.9]  Yes      Resolved Hospital Problems    Diagnosis Date Resolved POA    Sleep apnea [G47.30] 08/28/2020 Yes       Plan  · Continue cefepime and de-escalate as culture data becomes available.  · Transition to humidified HFNC.  Continue NIPPV prn.  · Continue oral steroids  · Continue ESBRIET  · Continue ELIQUIS And watch for further hemoptysis- would discontinue if any hemoptysis today  · Monitor platelets  · Needs ICU level of care for now    The following were evaluated and adjusted as needed: antibiotics and acid base balance and oxygenation needs       Critical Care  - THE PATIENT HAS A HIGH PROBABILITY OF IMMINENT OR LIFE THREATENING DETERIORATION.  Over 50%time of encounter was in direct care at bedside.  Time was 30 to 74 minutes required for  patient care.  Time needed for all of the above totaled 35 minutes.    Nani Brar MD  Pulmonary & Critical Care Medicine

## 2020-08-29 NOTE — PROGRESS NOTES
[facility] Medicine  Progress Note    Patient name: Manuel Casas  MRN: [id number]  Admit Date: 8/26/2020   LOS: 2 days     SUBJECTIVE:     Principal problem: Pneumonia    Interval History:  Patient was seen and examined bedside.  Since yesterday patient has some episodes of hemoptysis.  Still requiring 90-95% FiO2 via Vapotherm.  Did not use BiPAP last night. Otherwise hemodynamically stable.  Blood cultures are growing gram-negative rods however no speciation yet.     Scheduled Meds:   apixaban  2.5 mg Oral BID    bicalutamide  50 mg Oral Daily    ceFEPime (MAXIPIME) IVPB  2 g Intravenous Q12H    clotrimazole-betamethasone 1-0.05%   Topical (Top) BID    doxycycline  100 mg Oral Q12H    escitalopram oxalate  20 mg Oral Daily    famotidine  20 mg Oral BID    insulin NPH  45 Units Subcutaneous BID    levothyroxine  75 mcg Oral Before breakfast    montelukast  10 mg Oral QHS    pirfenidone  801 mg Oral TID    predniSONE  40 mg Oral Daily    pregabalin  150 mg Oral QHS     Continuous Infusions:  PRN Meds:acetaminophen, acetaminophen, albuterol, bisacodyL, dextrose 50%, dextrose 50%, diphenhydrAMINE, glucagon (human recombinant), glucose, glucose, insulin aspart U-100, magnesium oxide, magnesium oxide, ondansetron, potassium chloride, potassium chloride, potassium chloride, potassium, sodium phosphates, potassium, sodium phosphates, potassium, sodium phosphates, promethazine (PHENERGAN) IVPB, sodium chloride 0.9%    Review of patient's allergies indicates:  No Known Allergies    Review of Systems: As per interval history    OBJECTIVE:     Vital Signs (Most Recent)  Temp: 98 °F (36.7 °C) (08/28/20 1200)  Pulse: 70 (08/29/20 0800)  Resp: 15 (08/29/20 0800)  BP: 121/60 (08/29/20 0800)  SpO2: (!) 90 % (08/29/20 0800)    Vital Signs Range (Last 24H):  Temp:  [98 °F (36.7 °C)]   Pulse:  [69-70]   Resp:  [10-30]   BP: ()/(52-77)   SpO2:  [78 %-95 %]     I & O (Last  24H):    Intake/Output Summary (Last 24 hours) at 8/29/2020 0920  Last data filed at 8/29/2020 0800  Gross per 24 hour   Intake 1400 ml   Output 850 ml   Net 550 ml       Physical Exam:  General:  Sitting in the chair. Appears as stated age. Calm, obese  Eyes: No conjunctival injection. No scleral icterus.  ENT: Hearing grossly intact. No discharge from ears. No nasal discharge.   Neck: Supple, trachea midline. No JVD  CVS: RRR. No LE edema BL  Lungs:  Remains on Vapotherm with 95% FiO2, bilateral basal crackles noted.  No accessory muscle use.   Abdomen:  Soft, nontender and nondistended.  No organomegaly  Neuro: AOx3. Moves all extremities. Follows commands. 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    Active Hospital Problems    Diagnosis  POA    *Pneumonia [J18.9]  Yes    Gram-negative bacteremia [R78.81]  Yes    Sepsis [A41.9]  Yes    Hypothyroidism [E03.9]  Yes    Diabetes mellitus [E11.9]  Yes    Acute on chronic respiratory failure with hypoxia [J96.21]  Yes    Thrombocytopenia [D69.6]  Yes    Atrial flutter [I48.92]  Yes    Presence of cardiac pacemaker [Z95.0]  Yes    Pulmonary fibrosis [J84.10]  Yes     · UIP on ESBRIET since about  2015  · PFT (11/19) - TLC - 74%, DLCO - 80%      Obesity with body mass index 30 or greater [E66.9]  Yes      Resolved Hospital Problems    Diagnosis Date Resolved POA    Sleep apnea [G47.30] 08/28/2020 Yes         Plan:   Overall clinical picture is consistent with sepsis due to Gram-negative bacterial pneumonia in the background of lung fibrosis and chronic hypoxic respiratory failure. Still remains on significant oxygen via Vapotherm(95% FiO2).  Blood cultures are growing gram-negative rods.  Dr. Rdz discontinued vancomycin    Repeat chest x-ray.  Might consider small dose of Lasix    Hold Eliquis in light of hemoptysis.    Continue cefepime and doxycycline    Wean oxygen as tolerated    Continue p.o. steroids    BiPAP q.h.s.    Accu-Cheks, sliding scale insulin    Bronchodilators as needed    Follow-up on cultures    Follow-up on 2D echo    Stool studies    Continue current orders      VTE Risk Mitigation (From admission, onward)         Ordered     apixaban tablet 2.5 mg  2 times daily      08/27/20 0952     Reason for No Pharmacological VTE Prophylaxis  Once     Question:  Reasons:  Answer:  Risk of Bleeding    08/27/20 0341     IP VTE HIGH RISK PATIENT  Once      08/27/20 0341     Place sequential compression device  Until discontinued      08/27/20 0341                  Department Hospital Medicine  Critical access hospital  Kael Keith MD  Date of service: 08/29/2020

## 2020-08-30 PROBLEM — A41.9 SEPSIS: Status: RESOLVED | Noted: 2020-08-27 | Resolved: 2020-08-30

## 2020-08-30 PROBLEM — J96.91 RESPIRATORY FAILURE WITH HYPOXIA: Status: ACTIVE | Noted: 2020-08-30

## 2020-08-30 LAB
ANION GAP SERPL CALC-SCNC: 13 MMOL/L (ref 8–16)
BACTERIA BLD CULT: ABNORMAL
BASOPHILS # BLD AUTO: 0.06 K/UL (ref 0–0.2)
BASOPHILS NFR BLD: 0.4 % (ref 0–1.9)
BUN SERPL-MCNC: 36 MG/DL (ref 8–23)
CALCIUM SERPL-MCNC: 9.1 MG/DL (ref 8.7–10.5)
CHLORIDE SERPL-SCNC: 102 MMOL/L (ref 95–110)
CO2 SERPL-SCNC: 23 MMOL/L (ref 23–29)
CREAT SERPL-MCNC: 1.1 MG/DL (ref 0.5–1.4)
DIFFERENTIAL METHOD: ABNORMAL
EOSINOPHIL # BLD AUTO: 0 K/UL (ref 0–0.5)
EOSINOPHIL NFR BLD: 0.2 % (ref 0–8)
ERYTHROCYTE [DISTWIDTH] IN BLOOD BY AUTOMATED COUNT: 13.9 % (ref 11.5–14.5)
EST. GFR  (AFRICAN AMERICAN): >60 ML/MIN/1.73 M^2
EST. GFR  (NON AFRICAN AMERICAN): >60 ML/MIN/1.73 M^2
GLUCOSE SERPL-MCNC: 130 MG/DL (ref 70–110)
GLUCOSE SERPL-MCNC: 132 MG/DL (ref 70–110)
GLUCOSE SERPL-MCNC: 203 MG/DL (ref 70–110)
HCT VFR BLD AUTO: 45.8 % (ref 40–54)
HGB BLD-MCNC: 15.3 G/DL (ref 14–18)
IMM GRANULOCYTES # BLD AUTO: 0.39 K/UL (ref 0–0.04)
IMM GRANULOCYTES NFR BLD AUTO: 2.5 % (ref 0–0.5)
LYMPHOCYTES # BLD AUTO: 6.2 K/UL (ref 1–4.8)
LYMPHOCYTES NFR BLD: 40.1 % (ref 18–48)
MAGNESIUM SERPL-MCNC: 1.9 MG/DL (ref 1.6–2.6)
MCH RBC QN AUTO: 32.7 PG (ref 27–31)
MCHC RBC AUTO-ENTMCNC: 33.4 G/DL (ref 32–36)
MCV RBC AUTO: 98 FL (ref 82–98)
MONOCYTES # BLD AUTO: 0.7 K/UL (ref 0.3–1)
MONOCYTES NFR BLD: 4.5 % (ref 4–15)
NEUTROPHILS # BLD AUTO: 8.1 K/UL (ref 1.8–7.7)
NEUTROPHILS NFR BLD: 52.3 % (ref 38–73)
NRBC BLD-RTO: 1 /100 WBC
PHOSPHATE SERPL-MCNC: 3.2 MG/DL (ref 2.7–4.5)
PLATELET # BLD AUTO: 106 K/UL (ref 150–350)
PMV BLD AUTO: 11.1 FL (ref 9.2–12.9)
POTASSIUM SERPL-SCNC: 3.7 MMOL/L (ref 3.5–5.1)
RBC # BLD AUTO: 4.68 M/UL (ref 4.6–6.2)
SODIUM SERPL-SCNC: 138 MMOL/L (ref 136–145)
WBC # BLD AUTO: 15.47 K/UL (ref 3.9–12.7)

## 2020-08-30 PROCEDURE — 25000003 PHARM REV CODE 250: Performed by: HOSPITALIST

## 2020-08-30 PROCEDURE — 85025 COMPLETE CBC W/AUTO DIFF WBC: CPT

## 2020-08-30 PROCEDURE — 63600175 PHARM REV CODE 636 W HCPCS: Performed by: HOSPITALIST

## 2020-08-30 PROCEDURE — 99232 SBSQ HOSP IP/OBS MODERATE 35: CPT | Mod: ,,, | Performed by: INTERNAL MEDICINE

## 2020-08-30 PROCEDURE — 63600175 PHARM REV CODE 636 W HCPCS: Performed by: NURSE PRACTITIONER

## 2020-08-30 PROCEDURE — 27000221 HC OXYGEN, UP TO 24 HOURS

## 2020-08-30 PROCEDURE — 63600175 PHARM REV CODE 636 W HCPCS: Performed by: STUDENT IN AN ORGANIZED HEALTH CARE EDUCATION/TRAINING PROGRAM

## 2020-08-30 PROCEDURE — 82962 GLUCOSE BLOOD TEST: CPT

## 2020-08-30 PROCEDURE — 99900035 HC TECH TIME PER 15 MIN (STAT)

## 2020-08-30 PROCEDURE — 27100171 HC OXYGEN HIGH FLOW UP TO 24 HOURS

## 2020-08-30 PROCEDURE — 21000000 HC CCU ICU ROOM CHARGE

## 2020-08-30 PROCEDURE — 36415 COLL VENOUS BLD VENIPUNCTURE: CPT

## 2020-08-30 PROCEDURE — 94660 CPAP INITIATION&MGMT: CPT

## 2020-08-30 PROCEDURE — 25000003 PHARM REV CODE 250: Performed by: NURSE PRACTITIONER

## 2020-08-30 PROCEDURE — 84100 ASSAY OF PHOSPHORUS: CPT

## 2020-08-30 PROCEDURE — 21400001 HC TELEMETRY ROOM

## 2020-08-30 PROCEDURE — 63600175 PHARM REV CODE 636 W HCPCS: Performed by: INTERNAL MEDICINE

## 2020-08-30 PROCEDURE — 94761 N-INVAS EAR/PLS OXIMETRY MLT: CPT

## 2020-08-30 PROCEDURE — 99232 PR SUBSEQUENT HOSPITAL CARE,LEVL II: ICD-10-PCS | Mod: ,,, | Performed by: INTERNAL MEDICINE

## 2020-08-30 PROCEDURE — 80048 BASIC METABOLIC PNL TOTAL CA: CPT

## 2020-08-30 PROCEDURE — 83735 ASSAY OF MAGNESIUM: CPT

## 2020-08-30 RX ORDER — ESCITALOPRAM OXALATE 10 MG/1
10 TABLET ORAL DAILY
Status: DISCONTINUED | OUTPATIENT
Start: 2020-08-31 | End: 2020-08-30

## 2020-08-30 RX ORDER — ESCITALOPRAM OXALATE 10 MG/1
10 TABLET ORAL NIGHTLY
Status: DISCONTINUED | OUTPATIENT
Start: 2020-08-30 | End: 2020-09-04 | Stop reason: HOSPADM

## 2020-08-30 RX ORDER — FUROSEMIDE 10 MG/ML
20 INJECTION INTRAMUSCULAR; INTRAVENOUS ONCE
Status: COMPLETED | OUTPATIENT
Start: 2020-08-30 | End: 2020-08-30

## 2020-08-30 RX ADMIN — HUMAN INSULIN 45 UNITS: 100 INJECTION, SUSPENSION SUBCUTANEOUS at 09:08

## 2020-08-30 RX ADMIN — ESCITALOPRAM OXALATE 10 MG: 10 TABLET ORAL at 08:08

## 2020-08-30 RX ADMIN — HUMAN INSULIN 45 UNITS: 100 INJECTION, SUSPENSION SUBCUTANEOUS at 10:08

## 2020-08-30 RX ADMIN — POTASSIUM CHLORIDE 40 MEQ: 1.5 POWDER, FOR SOLUTION ORAL at 10:08

## 2020-08-30 RX ADMIN — PREGABALIN 150 MG: 75 CAPSULE ORAL at 08:08

## 2020-08-30 RX ADMIN — PIRFENIDONE 801 MG: 801 TABLET, FILM COATED ORAL at 04:08

## 2020-08-30 RX ADMIN — LEVOTHYROXINE SODIUM 75 MCG: 25 TABLET ORAL at 06:08

## 2020-08-30 RX ADMIN — PIRFENIDONE 801 MG: 801 TABLET, FILM COATED ORAL at 08:08

## 2020-08-30 RX ADMIN — MONTELUKAST SODIUM 10 MG: 10 TABLET, COATED ORAL at 08:08

## 2020-08-30 RX ADMIN — BICALUTAMIDE 50 MG: 50 TABLET, FILM COATED ORAL at 08:08

## 2020-08-30 RX ADMIN — CEFEPIME HYDROCHLORIDE 2 G: 2 INJECTION, SOLUTION INTRAVENOUS at 12:08

## 2020-08-30 RX ADMIN — FUROSEMIDE 20 MG: 10 INJECTION, SOLUTION INTRAMUSCULAR; INTRAVENOUS at 11:08

## 2020-08-30 RX ADMIN — PREDNISONE 40 MG: 20 TABLET ORAL at 08:08

## 2020-08-30 RX ADMIN — CLOTRIMAZOLE AND BETAMETHASONE DIPROPIONATE: 10; .5 CREAM TOPICAL at 08:08

## 2020-08-30 RX ADMIN — CEFTRIAXONE 2 G: 2 INJECTION, SOLUTION INTRAVENOUS at 04:08

## 2020-08-30 RX ADMIN — FAMOTIDINE 20 MG: 20 TABLET ORAL at 08:08

## 2020-08-30 RX ADMIN — CLOTRIMAZOLE AND BETAMETHASONE DIPROPIONATE: 10; .5 CREAM TOPICAL at 09:08

## 2020-08-30 RX ADMIN — DOXYCYCLINE HYCLATE 100 MG: 100 CAPSULE ORAL at 08:08

## 2020-08-30 NOTE — PROGRESS NOTES
"Pulmonary/Critical Care Medicine  Progress Note      Patient name: Manuel Casas  MRN: [id number]  Date: 08/30/2020    Admit Date: 8/26/2020  Consult Requested By: Kael Keith MD    Reason for Consult: REspiratory failure, pneumonia, pulm fibrosis    HPI:    8/27/2020 - 86 yo male who I met 2 days ago has h/o pulmonary fibrosis (fairly mild but progressive, on ESBRIET, home O2) had recent respiratory illness treated at Urgent Care and when I saw him he was stablle but with some increased dyspnea and I placed him on a short prednisone taper.  Yesterday during the day he felt well, SOB was better and he was active.  That evening he had an episode of chills and rigors and coughed up some bloody sputum.  He then began to develop fever and came to ER.  Covid test was negative and xrays show a RLL infiltrate c/w CAP.  He has needed BiPAP with 100% O2 and is now admitted for further treatment.  He states that he would prefer to not be intubated unless "absolutely necessary".  ROS as below.  He has not had any corona virus exposures and has been practicing social distancing.    8/28/2020:  No major issues overnight.  Subjectively improved and without any new complaints.  8/29- had hemoptysis dark red blood several times yesterday but no change in shortness of breath. No hemoptysis today. On vapotherm 40L FiO2 95%  8/30- feels better. FiO2 now 75%. No hemoptysis    Review of Systems    Review of Systems   Constitutional: Negative for chills, fever and malaise/fatigue.   Respiratory: Positive for cough, sputum production and shortness of breath. Negative for wheezing.    Cardiovascular: Negative for chest pain, leg swelling and PND.   Genitourinary: Negative for dysuria, frequency and urgency.   Musculoskeletal: Positive for myalgias.   Psychiatric/Behavioral: The patient is not nervous/anxious.      Past Medical History    Past Medical History:   Diagnosis Date    Atrial flutter     Cancer     prostate    Congestive " heart failure     Diabetes mellitus, type 2     Heart failure     right sided    Hyperlipidemia     Pulmonary fibrosis     Sleep apnea        Past Surgical History    Past Surgical History:   Procedure Laterality Date    ADENOIDECTOMY      CARDIAC PACEMAKER PLACEMENT      HERNIA REPAIR      left, umbilical    TONSILLECTOMY         Medications (scheduled):      bicalutamide  50 mg Oral Daily    ceFEPime (MAXIPIME) IVPB  2 g Intravenous Q12H    clotrimazole-betamethasone 1-0.05%   Topical (Top) BID    [START ON 8/31/2020] escitalopram oxalate  10 mg Oral Daily    famotidine  20 mg Oral BID    insulin NPH  45 Units Subcutaneous BID    levothyroxine  75 mcg Oral Before breakfast    montelukast  10 mg Oral QHS    pirfenidone  801 mg Oral TID    predniSONE  40 mg Oral Daily    pregabalin  150 mg Oral QHS       Medications (infusions):         Medications (prn):     acetaminophen, acetaminophen, albuterol, bisacodyL, dextrose 50%, dextrose 50%, diphenhydrAMINE, glucagon (human recombinant), glucose, glucose, insulin aspart U-100, magnesium oxide, magnesium oxide, ondansetron, potassium chloride, potassium chloride, potassium chloride, potassium, sodium phosphates, potassium, sodium phosphates, potassium, sodium phosphates, promethazine (PHENERGAN) IVPB, sodium chloride 0.9%    Family History:   Family History   Problem Relation Age of Onset    Heart disease Mother     Diabetes Mother     Hypertension Mother        Social History: Tobacco:   Social History     Tobacco Use   Smoking Status Former Smoker                                EtOH:   Social History     Substance and Sexual Activity   Alcohol Use None                                Drugs:   Social History     Substance and Sexual Activity   Drug Use Not on file                                Occupation: retired urologist                             Asbestos exposure: no    Physical Exam    Vital signs:  Temp:  [97.4 °F (36.3 °C)-98.3 °F (36.8  "°C)]   Pulse:  [69-72]   Resp:  [13-30]   BP: (111-163)/(54-75)   SpO2:  [89 %-100 %]     Intake/Output:     Intake/Output Summary (Last 24 hours) at 8/30/2020 1313  Last data filed at 8/30/2020 1230  Gross per 24 hour   Intake 1590 ml   Output 2800 ml   Net -1210 ml        BMI: Estimated body mass index is 35.21 kg/m² as calculated from the following:    Height as of this encounter: 6' 4" (1.93 m).    Weight as of this encounter: 131.2 kg (289 lb 3.9 oz).    Physical Exam   Constitutional: He is oriented to person, place, and time. No distress.   Obese male, sitting up in chair   HENT:   Head: Normocephalic and atraumatic.   Right Ear: External ear normal.   Left Ear: External ear normal.   Mouth/Throat: Oropharynx is clear and moist.   HFNC in place   Eyes: Pupils are equal, round, and reactive to light. Conjunctivae are normal. Right eye exhibits no discharge. Left eye exhibits no discharge. No scleral icterus.   Neck: Normal range of motion. Neck supple. No JVD present. No tracheal deviation present. No thyromegaly present.   Cardiovascular: Normal rate, regular rhythm, normal heart sounds and intact distal pulses. Exam reveals no gallop and no friction rub.   No murmur heard.  Pulmonary/Chest: Effort normal. No stridor. No respiratory distress. He has no wheezes. He has rales (few posterior rales).   Fine crackles bilateral bases   Abdominal: Soft. Bowel sounds are normal. He exhibits no distension. There is no abdominal tenderness. There is no rebound.   obese   Musculoskeletal: Normal range of motion.         General: No tenderness or edema.   Lymphadenopathy:     He has no cervical adenopathy.   Neurological: He is alert and oriented to person, place, and time. GCS score is 15.   Some generalized weakness   Skin: Skin is warm and dry. No rash noted. He is not diaphoretic. No erythema.   Psychiatric: Mood, memory, affect and judgment normal.   Nursing note and vitals reviewed.      Laboratory    Recent Labs "   Lab 08/30/20 0429   WBC 15.47*   RBC 4.68   HGB 15.3   HCT 45.8   *   MCV 98   MCH 32.7*   MCHC 33.4       Recent Labs   Lab 08/30/20 0429   CALCIUM 9.1      K 3.7   CO2 23      BUN 36*   CREATININE 1.1       No results for input(s): PT, INR, APTT in the last 24 hours.    No results for input(s): CPK, CPKMB, TROPONINI, MB in the last 24 hours.    Additional labs:     LA - 8 -- 4.8    Results for JOHN SHELBY (MRN [id number]) as of 8/29/2020 07:56   Ref. Range 8/29/2020 03:16   Procalcitonin Latest Ref Range: 0.00 - 0.50 ng/mL 4.47 (H)       Covid - neg    UA - noted    Microbiology:       Microbiology Results (last 7 days)     Procedure Component Value Units Date/Time    Blood culture x two cultures. Draw prior to antibiotics. [[id number]]  (Abnormal) Collected: 08/27/20 0030    Order Status: Completed Specimen: Blood from Peripheral, Forearm, Left Updated: 08/30/20 0728     Blood Culture, Routine Gram stain aer bottle: Gram negative rods      Results called to and read back by: Oriana Paz RN in MICU by GARIMA      08/27/2020  11:27      [facility]  For susceptibility see order #[id number]      Narrative:      Aerobic and anaerobic    Blood culture x two cultures. Draw prior to antibiotics. [[id number]]  (Abnormal)  (Susceptibility) Collected: 08/27/20 0026    Order Status: Completed Specimen: Blood from Peripheral, Forearm, Right Updated: 08/30/20 0728     Blood Culture, Routine Gram stain aer bottle: Gram negative rods      Results called to and read back by: Oriana Paz RN in MICU by GARIMA      08/27/2020  11:27      [facility]    Narrative:      Aerobic and anaerobic    Blood culture [[id number]] Collected: 08/29/20 0316    Order Status: Completed Specimen: Blood Updated: 08/30/20 0432     Blood Culture, Routine No Growth to date      No Growth to date    Blood culture [[id number]] Collected: 08/28/20 0413    Order Status: Completed Specimen: Blood Updated:  08/30/20 0432     Blood Culture, Routine No Growth to date      No Growth to date      No Growth to date    Clostridium difficile EIA [[id number]] Collected: 08/28/20 2332    Order Status: Canceled Specimen: Stool           Radiology    Imaging Results          CT Chest Abdomen Pelvis With Contrast (Final result)  Result time 08/27/20 01:26:00    Final result by Zulma Cruz MD (08/27/20 01:26:00)                 Narrative:    EXAM:  CT Chest, Abdomen and Pelvis With Intravenous Contrast    CLINICAL HISTORY:  The patient is 87 years old and is Male; Sepsis    TECHNIQUE:  Axial computed tomography images of the chest, abdomen and pelvis with intravenous contrast.  Sagittal and coronal reformatted images were created and reviewed.  This CT exam was performed using one or more of the following dose reduction techniques:  automated exposure control, adjustment of the mA and/or kV according to patient size, and/or use of iterative reconstruction technique.    COMPARISON:  No relevant prior studies available.    FINDINGS:  CHEST:  LUNGS:  Patchy area of groundglass opacity within the right lower lobe with interlobular septal thickening is present. Dependent atelectasis within the lung bases is also noted.  PLEURAL SPACE:  Small moderate left pleural effusion is present.  No pneumothorax.  HEART:  No cardiomegaly.  No pericardial effusion.  MEDIASTINUM:  The tracheobronchial tree is widely patent.    ABDOMEN:  LIVER:  Unremarkable.  No mass.  GALLBLADDER AND BILE DUCTS:  No calcified stones.  No ductal dilation.  PANCREAS:  No ductal dilation.  No mass.  SPLEEN:  Unremarkable.  ADRENALS:  Unremarkable.  No mass.  KIDNEYS AND URETERS:  Unremarkable. The kidneys enhance symmetrically. No obstructing renal or ureteral calculus is seen. No hydronephrosis or hydroureter. No perinephric fluid or stranding.  STOMACH AND BOWEL:  The stomach is well distended with food contents. The small bowel is normal in caliber. Stool is  present throughout the colon. Scattered colonic diverticula are noted without surrounding inflammation. There is no bowel obstruction.    PELVIS:  APPENDIX:  No findings to suggest acute appendicitis.  BLADDER:  The bladder is nearly empty.  REPRODUCTIVE:  Unremarkable as visualized.    CHEST, ABDOMEN and PELVIS:  INTRAPERITONEAL SPACE:  Unremarkable.  No significant fluid collection.  No free air.  BONES/JOINTS:  Multilevel degenerative change of the spine is present.  SOFT TISSUES:  Postsurgical change of the left inguinal region is present.  VASCULATURE:  Atherosclerosis of the vasculature is present.  No aortic aneurysm.  LYMPH NODES:  Shotty mediastinal lymph nodes are present.  TUBES, LINES AND DEVICES:  A dual-lead left-sided pacemaker is present.    IMPRESSION:  1.  Left pleural effusion with associated bibasilar atelectasis and infectious process within the right lower lobe.  2.  Colonic diverticulosis.    Electronically signed by:  Zulma Cruz MD  8/27/2020 3:01 AM CDT Workstation: [id number]                             X-Ray Chest AP Portable (Final result)  Result time 08/27/20 00:39:02    Final result by Eduin Lam MD (08/27/20 00:39:02)                 Narrative:    HISTORY: Fever, cough, hemoptysis, suspected Covid 19 virus infection.    FINDINGS: Portable chest radiograph at 0041 hours compared to  07/15/2015 shows dual lead left subclavian CCD with distal lead tips  unchanged in position in the right atrium and right ventricle. The  cardiomediastinal silhouette and pulmonary vasculature are normal,  with aortic vascular calcifications.    The lungs are symmetrically expanded, with scattered reticulonodular  and groundglass opacities in both lower lungs, right greater than  left. There is no dense consolidation, large pleural effusion, or  evidence of pulmonary edema. No pneumothorax or acute osseous  abnormality.    IMPRESSION: Scattered bilateral lower lung interstitial opacities  right  greater than left, which could reflect Covid 19 pneumonia in the  appropriate clinical setting.    Electronically Signed by Eduin SMALLS on 8/27/2020 6:51 AM                              Additional Studies    EKG (8/27)  Vent. Rate : 070 BPM     Atrial Rate : 119 BPM      P-R Int : 000 ms          QRS Dur : 124 ms       QT Int : 402 ms       P-R-T Axes : 000 -88 083 degrees      QTc Int : 434 ms     Ventricular-paced rhythm   Abnormal ECG   No previous ECGs available    Ventilator Information    Oxygen Concentration (%):  [75-90] 75         No results for input(s): PH, PCO2, PO2, HCO3, POCSATURATED, BE in the last 72 hours.      Impression    Active Hospital Problems    Diagnosis  POA    *Pneumonia [J18.9]  Yes    Respiratory failure with hypoxia [J96.91]  Yes    History of pulmonary fibrosis [Z87.09]  Not Applicable    Gram-negative bacteremia [R78.81]  Yes    Hypothyroidism [E03.9]  Yes    Diabetes mellitus [E11.9]  Yes    Acute on chronic respiratory failure with hypoxia [J96.21]  Yes    Thrombocytopenia [D69.6]  Yes    Atrial flutter [I48.92]  Yes    Presence of cardiac pacemaker [Z95.0]  Yes    Pulmonary fibrosis [J84.10]  Yes     · UIP on ESBRIET since about 2015  · PFT (11/19) - TLC - 74%, DLCO - 80%      Obesity with body mass index 30 or greater [E66.9]  Yes      Resolved Hospital Problems    Diagnosis Date Resolved POA    Hemoptysis [R04.2] 08/30/2020 Yes    Sepsis [A41.9] 08/30/2020 Yes    Sleep apnea [G47.30] 08/28/2020 Yes       Plan  · acinetobacter on blood cx- deescalate to rocephin  · Continue HFNC, titrate to keep sats 88-92%  · Continue oral steroids  · Continue ESBRIET  · Continue ELIQUIS And watch for further hemoptysis  · Monitor platelets  · Acceptable for transfer out of ICU  · Start PT, mobilize as able  · Wife updated at bedside      Nani Brar MD  Pulmonary & Critical Care Medicine

## 2020-08-30 NOTE — PLAN OF CARE
08/29/20 2047   Patient Assessment/Suction   Level of Consciousness (AVPU) alert   Respiratory Effort Normal;Unlabored   Expansion/Accessory Muscles/Retractions no use of accessory muscles   All Lung Fields Breath Sounds   (few scattered crackles)   PRE-TX-O2   O2 Device (Oxygen Therapy) Vapotherm   SpO2 (!) 93 %   Pulse Oximetry Type Continuous   $ Pulse Oximetry - Multiple Charge Pulse Oximetry - Multiple   Pulse 70   Resp 18   Inhaler   $ Inhaler Charges PRN treatment not required   Daily Review of Necessity (Inhaler) completed   Respiratory Treatment Status (Inhaler) PRN treatment not required   Preset CPAP/BiPAP Settings   Mode Of Delivery BiPAP S/T;Standby   continue tx. As ordered

## 2020-08-30 NOTE — NURSING TRANSFER
Nursing Transfer Note      8/30/2020     [id number]    Transfer via bed    Transfer with cardiac monitoring    Transported by tcb    Medicines sent :yes      Chart send with patient: Yes    Notified: spouse    Patient reassessed at: 8/30/2020    Upon arrival to floor: cardiac monitor applied, patient oriented to room, call bell in reach and bed in lowest position

## 2020-08-30 NOTE — NURSING
Pt. Able to breath better while exerting self. Lasix given and voided 1400 cc clear yellow urine. Sat in the chair for approx 5 hours. Resting quietly in bed. Transferred to 2533 with o2 in use and monitor intact. Report to mandi APPIAH.

## 2020-08-30 NOTE — PROGRESS NOTES
[facility] Medicine  Progress Note    Patient name: Manuel Casas  MRN: [id number]  Admit Date: 8/26/2020   LOS: 3 days     SUBJECTIVE:     Principal problem: Pneumonia    Interval History:  Patient was seen and examined bedside.  His hemoptysis and nose bleed have resolved.  Oxygen requirement has improved and currently breathing on Vapotherm 75% FiO2(yesterday he was on 95% FiO2).  Blood cultures have finalized and growing Acinetobacter susceptible to cefepime.  Otherwise hemodynamically stable    Scheduled Meds:   bicalutamide  50 mg Oral Daily    ceFEPime (MAXIPIME) IVPB  2 g Intravenous Q12H    clotrimazole-betamethasone 1-0.05%   Topical (Top) BID    doxycycline  100 mg Oral Q12H    escitalopram oxalate  20 mg Oral Daily    famotidine  20 mg Oral BID    insulin NPH  45 Units Subcutaneous BID    levothyroxine  75 mcg Oral Before breakfast    montelukast  10 mg Oral QHS    pirfenidone  801 mg Oral TID    predniSONE  40 mg Oral Daily    pregabalin  150 mg Oral QHS     Continuous Infusions:  PRN Meds:acetaminophen, acetaminophen, albuterol, bisacodyL, dextrose 50%, dextrose 50%, diphenhydrAMINE, glucagon (human recombinant), glucose, glucose, insulin aspart U-100, magnesium oxide, magnesium oxide, ondansetron, potassium chloride, potassium chloride, potassium chloride, potassium, sodium phosphates, potassium, sodium phosphates, potassium, sodium phosphates, promethazine (PHENERGAN) IVPB, sodium chloride 0.9%    Review of patient's allergies indicates:  No Known Allergies    Review of Systems: As per interval history    OBJECTIVE:     Vital Signs (Most Recent)  Temp: 98.2 °F (36.8 °C) (08/30/20 0400)  Pulse: 70 (08/30/20 0753)  Resp: 18 (08/30/20 0753)  BP: 136/67 (08/30/20 0600)  SpO2: (!) 92 % (08/30/20 0753)    Vital Signs Range (Last 24H):  Temp:  [97.4 °F (36.3 °C)-98.2 °F (36.8 °C)]   Pulse:  [69-72]   Resp:  [13-30]   BP: (111-163)/(54-73)   SpO2:  [89 %-100 %]     I & O  (Last 24H):    Intake/Output Summary (Last 24 hours) at 8/30/2020 0957  Last data filed at 8/30/2020 0301  Gross per 24 hour   Intake 940 ml   Output 1400 ml   Net -460 ml       Physical Exam:  General:  Sitting in the chair. Appears as stated age. Calm, obese  Eyes: No conjunctival injection. No scleral icterus.  ENT: Hearing grossly intact. No discharge from ears. No nasal discharge.   Neck: Supple, trachea midline. No JVD  CVS: RRR. No LE edema BL  Lungs:  Remains on Vapotherm with 75% FiO2, bilateral basal crackles noted.  No accessory muscle use.   Abdomen:  Soft, nontender and nondistended.  No organomegaly  Neuro: AOx3. Moves all extremities. Follows commands. Responds appropriately   Skin:  No rash or erythema noted    Laboratory:  CBC:   Recent Labs   Lab 08/30/20 0429   WBC 15.47*   RBC 4.68   HGB 15.3   HCT 45.8   *   MCV 98   MCH 32.7*   MCHC 33.4     CMP:   Recent Labs   Lab 08/28/20  0413  08/30/20 0429   *   < > 130*   CALCIUM 8.6*   < > 9.1   ALBUMIN 3.5  --   --    PROT 5.3*  --   --       < > 138   K 4.2   < > 3.7   CO2 26   < > 23      < > 102   BUN 30*   < > 36*   CREATININE 1.2   < > 1.1   ALKPHOS 41*  --   --    ALT 24  --   --    AST 18  --   --    BILITOT 1.8*  --   --     < > = values in this interval not displayed.       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    Active Hospital Problems    Diagnosis  POA    *Pneumonia [J18.9]  Yes    Hemoptysis [R04.2]  Yes    History of pulmonary fibrosis [Z87.09]  Not Applicable    Gram-negative bacteremia [R78.81]  Yes    Sepsis [A41.9]  Yes    Hypothyroidism [E03.9]  Yes    Diabetes mellitus [E11.9]  Yes    Acute on chronic respiratory  failure with hypoxia [J96.21]  Yes    Thrombocytopenia [D69.6]  Yes    Atrial flutter [I48.92]  Yes    Presence of cardiac pacemaker [Z95.0]  Yes    Pulmonary fibrosis [J84.10]  Yes     · UIP on ESBRIET since about 2015  · PFT (11/19) - TLC - 74%, DLCO - 80%      Obesity with body mass index 30 or greater [E66.9]  Yes      Resolved Hospital Problems    Diagnosis Date Resolved POA    Sleep apnea [G47.30] 08/28/2020 Yes         Plan:   Overall clinical picture is consistent with sepsis due to Gram-negative bacterial pneumonia in the background of lung fibrosis and chronic hypoxic respiratory failure. Still remains on significant oxygen via Vapotherm(95% FiO2).  Blood cultures are growing Acinetobacter.     Hold Eliquis for 1 more day.  Might resume at lower dose tomorrow    Continue cefepime     Trial of 20 mg IV Lasix with KCL    Wean oxygen as tolerated    Continue p.o. steroids    BiPAP q.h.s.    Accu-Cheks, sliding scale insulin    Bronchodilators as needed    Follow-up on 2D echo    Continue current orders    Chest x-ray tomorrow AM    Transfer to cardiology B      VTE Risk Mitigation (From admission, onward)         Ordered     Reason for No Pharmacological VTE Prophylaxis  Once     Question:  Reasons:  Answer:  Risk of Bleeding    08/27/20 0341     IP VTE HIGH RISK PATIENT  Once      08/27/20 0341     Place sequential compression device  Until discontinued      08/27/20 0341                  Department Hospital Medicine  [facility]  Kael Keith MD  Date of service: 08/30/2020

## 2020-08-30 NOTE — PLAN OF CARE
08/30/20 0753   Patient Assessment/Suction   Level of Consciousness (AVPU) alert   Respiratory Effort Normal;Unlabored   Expansion/Accessory Muscles/Retractions expansion symmetric;no retractions;no use of accessory muscles   All Lung Fields Breath Sounds clear   Rhythm/Pattern, Respiratory unlabored;pattern regular;depth regular;chest wiggle adequate;no shortness of breath reported   Cough Frequency infrequent   Cough Type good   PRE-TX-O2   O2 Device (Oxygen Therapy) Vapotherm   $ Is the patient on Low Flow Oxygen? Yes   Humidification temp set 33   Humidification temp actual 33   Flow (L/min) 35   Oxygen Concentration (%) 75   SpO2 (!) 92 %   Pulse Oximetry Type Continuous   $ Pulse Oximetry - Multiple Charge Pulse Oximetry - Multiple   Pulse 70   Resp 18   Inhaler   $ Inhaler Charges PRN treatment not required   Preset CPAP/BiPAP Settings   Mode Of Delivery Standby   Respiratory Evaluation   $ Care Plan Tech Time 15 min

## 2020-08-31 LAB
ANION GAP SERPL CALC-SCNC: 10 MMOL/L (ref 8–16)
BASOPHILS NFR BLD: 0 % (ref 0–1.9)
BUN SERPL-MCNC: 35 MG/DL (ref 8–23)
CALCIUM SERPL-MCNC: 9 MG/DL (ref 8.7–10.5)
CHLORIDE SERPL-SCNC: 105 MMOL/L (ref 95–110)
CO2 SERPL-SCNC: 24 MMOL/L (ref 23–29)
CREAT SERPL-MCNC: 1.2 MG/DL (ref 0.5–1.4)
DIFFERENTIAL METHOD: ABNORMAL
EOSINOPHIL NFR BLD: 0 % (ref 0–8)
ERYTHROCYTE [DISTWIDTH] IN BLOOD BY AUTOMATED COUNT: 13.9 % (ref 11.5–14.5)
EST. GFR  (AFRICAN AMERICAN): >60 ML/MIN/1.73 M^2
EST. GFR  (NON AFRICAN AMERICAN): 54 ML/MIN/1.73 M^2
GLUCOSE SERPL-MCNC: 107 MG/DL (ref 70–110)
GLUCOSE SERPL-MCNC: 144 MG/DL (ref 70–110)
GLUCOSE SERPL-MCNC: 183 MG/DL (ref 70–110)
GLUCOSE SERPL-MCNC: 248 MG/DL (ref 70–110)
GLUCOSE SERPL-MCNC: 96 MG/DL (ref 70–110)
HCT VFR BLD AUTO: 46.5 % (ref 40–54)
HGB BLD-MCNC: 15.6 G/DL (ref 14–18)
IMM GRANULOCYTES # BLD AUTO: ABNORMAL K/UL (ref 0–0.04)
IMM GRANULOCYTES NFR BLD AUTO: ABNORMAL % (ref 0–0.5)
LYMPHOCYTES NFR BLD: 44 % (ref 18–48)
MAGNESIUM SERPL-MCNC: 1.9 MG/DL (ref 1.6–2.6)
MCH RBC QN AUTO: 33.1 PG (ref 27–31)
MCHC RBC AUTO-ENTMCNC: 33.5 G/DL (ref 32–36)
MCV RBC AUTO: 99 FL (ref 82–98)
MONOCYTES NFR BLD: 4 % (ref 4–15)
NEUTROPHILS NFR BLD: 43 % (ref 38–73)
NEUTS BAND NFR BLD MANUAL: 9 %
NRBC BLD-RTO: 1 /100 WBC
PHOSPHATE SERPL-MCNC: 3.9 MG/DL (ref 2.7–4.5)
PLATELET # BLD AUTO: 106 K/UL (ref 150–350)
PLATELET BLD QL SMEAR: ABNORMAL
PMV BLD AUTO: 10.3 FL (ref 9.2–12.9)
POTASSIUM SERPL-SCNC: 3.7 MMOL/L (ref 3.5–5.1)
RBC # BLD AUTO: 4.71 M/UL (ref 4.6–6.2)
SODIUM SERPL-SCNC: 139 MMOL/L (ref 136–145)
WBC # BLD AUTO: 13.28 K/UL (ref 3.9–12.7)

## 2020-08-31 PROCEDURE — 27100171 HC OXYGEN HIGH FLOW UP TO 24 HOURS

## 2020-08-31 PROCEDURE — 27000221 HC OXYGEN, UP TO 24 HOURS

## 2020-08-31 PROCEDURE — 84100 ASSAY OF PHOSPHORUS: CPT

## 2020-08-31 PROCEDURE — 21400001 HC TELEMETRY ROOM

## 2020-08-31 PROCEDURE — 63600175 PHARM REV CODE 636 W HCPCS: Performed by: HOSPITALIST

## 2020-08-31 PROCEDURE — 25000003 PHARM REV CODE 250: Performed by: HOSPITALIST

## 2020-08-31 PROCEDURE — 63600175 PHARM REV CODE 636 W HCPCS: Performed by: INTERNAL MEDICINE

## 2020-08-31 PROCEDURE — 21000000 HC CCU ICU ROOM CHARGE

## 2020-08-31 PROCEDURE — 85007 BL SMEAR W/DIFF WBC COUNT: CPT

## 2020-08-31 PROCEDURE — 94660 CPAP INITIATION&MGMT: CPT

## 2020-08-31 PROCEDURE — 94761 N-INVAS EAR/PLS OXIMETRY MLT: CPT

## 2020-08-31 PROCEDURE — 85027 COMPLETE CBC AUTOMATED: CPT

## 2020-08-31 PROCEDURE — 97530 THERAPEUTIC ACTIVITIES: CPT

## 2020-08-31 PROCEDURE — 99232 PR SUBSEQUENT HOSPITAL CARE,LEVL II: ICD-10-PCS | Mod: ,,, | Performed by: INTERNAL MEDICINE

## 2020-08-31 PROCEDURE — 80048 BASIC METABOLIC PNL TOTAL CA: CPT

## 2020-08-31 PROCEDURE — 36415 COLL VENOUS BLD VENIPUNCTURE: CPT

## 2020-08-31 PROCEDURE — 97162 PT EVAL MOD COMPLEX 30 MIN: CPT

## 2020-08-31 PROCEDURE — 99232 SBSQ HOSP IP/OBS MODERATE 35: CPT | Mod: ,,, | Performed by: INTERNAL MEDICINE

## 2020-08-31 PROCEDURE — 83735 ASSAY OF MAGNESIUM: CPT

## 2020-08-31 PROCEDURE — 99900035 HC TECH TIME PER 15 MIN (STAT)

## 2020-08-31 RX ORDER — FUROSEMIDE 10 MG/ML
20 INJECTION INTRAMUSCULAR; INTRAVENOUS ONCE
Status: COMPLETED | OUTPATIENT
Start: 2020-08-31 | End: 2020-08-31

## 2020-08-31 RX ORDER — POTASSIUM CHLORIDE 20 MEQ/1
40 TABLET, EXTENDED RELEASE ORAL ONCE
Status: COMPLETED | OUTPATIENT
Start: 2020-08-31 | End: 2020-08-31

## 2020-08-31 RX ADMIN — PREDNISONE 40 MG: 20 TABLET ORAL at 09:08

## 2020-08-31 RX ADMIN — LEVOTHYROXINE SODIUM 75 MCG: 25 TABLET ORAL at 07:08

## 2020-08-31 RX ADMIN — CLOTRIMAZOLE AND BETAMETHASONE DIPROPIONATE: 10; .5 CREAM TOPICAL at 10:08

## 2020-08-31 RX ADMIN — PIRFENIDONE 801 MG: 801 TABLET, FILM COATED ORAL at 09:08

## 2020-08-31 RX ADMIN — PIRFENIDONE 801 MG: 801 TABLET, FILM COATED ORAL at 02:08

## 2020-08-31 RX ADMIN — PIRFENIDONE 801 MG: 801 TABLET, FILM COATED ORAL at 10:08

## 2020-08-31 RX ADMIN — CLOTRIMAZOLE AND BETAMETHASONE DIPROPIONATE: 10; .5 CREAM TOPICAL at 09:08

## 2020-08-31 RX ADMIN — FAMOTIDINE 20 MG: 20 TABLET ORAL at 09:08

## 2020-08-31 RX ADMIN — PREGABALIN 150 MG: 75 CAPSULE ORAL at 09:08

## 2020-08-31 RX ADMIN — FUROSEMIDE 20 MG: 10 INJECTION, SOLUTION INTRAMUSCULAR; INTRAVENOUS at 04:08

## 2020-08-31 RX ADMIN — BICALUTAMIDE 50 MG: 50 TABLET, FILM COATED ORAL at 10:08

## 2020-08-31 RX ADMIN — HUMAN INSULIN 35 UNITS: 100 INJECTION, SUSPENSION SUBCUTANEOUS at 10:08

## 2020-08-31 RX ADMIN — CEFTRIAXONE 2 G: 2 INJECTION, SOLUTION INTRAVENOUS at 02:08

## 2020-08-31 RX ADMIN — HUMAN INSULIN 35 UNITS: 100 INJECTION, SUSPENSION SUBCUTANEOUS at 09:08

## 2020-08-31 RX ADMIN — CEFTRIAXONE 2 G: 2 INJECTION, SOLUTION INTRAVENOUS at 04:08

## 2020-08-31 RX ADMIN — POTASSIUM CHLORIDE 40 MEQ: 20 TABLET, EXTENDED RELEASE ORAL at 04:08

## 2020-08-31 RX ADMIN — ESCITALOPRAM OXALATE 10 MG: 10 TABLET ORAL at 09:08

## 2020-08-31 NOTE — PROGRESS NOTES
"Pulmonary/Critical Care Progress Note      Patient name: Manuel Casas  MRN: [id number]  Date: 08/31/2020    Admit Date: 8/26/2020  Consult Requested By: Kael Keith MD    Reason for Consult: REspiratory failure, pneumonia, pulm fibrosis    HPI:    8/27/2020 - 88 yo male who I met 2 days ago has h/o pulmonary fibrosis (fairly mild but progressive, on ESBRIET, home O2) had recent respiratory illness treated at Urgent Care and when I saw him he was stablle but with some increased dyspnea and I placed him on a short prednisone taper.  Yesterday during the day he felt well, SOB was better and he was active.  That evening he had an episode of chills and rigors and coughed up some bloody sputum.  He then began to develop fever and came to ER.  Covid test was negative and xrays show a RLL infiltrate c/w CAP.  He has needed BiPAP with 100% O2 and is now admitted for further treatment.  He states that he would prefer to not be intubated unless "absolutely necessary".  ROS as below.  He has not had any corona virus exposures and has been practicing social distancing.    8/31/2020 - Events of the weekend noted, no new issues reported and has been transferred to floor.  Doing better but still with high FiO2 requirements with both vapotherm and BiPAP.  He states that he feels better overall.  CXR shows some clearing at right lung base.    Review of Systems    Review of Systems   Constitutional: Positive for chills, fever and malaise/fatigue. Negative for diaphoresis and weight loss.   HENT: Negative for congestion, nosebleeds and sinus pain.    Eyes: Negative for pain.   Respiratory: Positive for cough, hemoptysis, sputum production and shortness of breath. Negative for wheezing and stridor.    Cardiovascular: Positive for leg swelling (chronic and not worse). Negative for chest pain, palpitations, orthopnea, claudication and PND.   Gastrointestinal: Positive for diarrhea. Negative for abdominal pain, blood in stool, " constipation, heartburn, nausea and vomiting.   Genitourinary: Negative for dysuria, frequency, hematuria and urgency.   Musculoskeletal: Negative for back pain, falls, joint pain, myalgias and neck pain.   Skin: Negative for itching and rash.   Neurological: Positive for weakness (some generalized). Negative for dizziness, tingling, tremors, sensory change, speech change, focal weakness, seizures, loss of consciousness and headaches.   Psychiatric/Behavioral: Negative for depression, substance abuse and suicidal ideas. The patient is not nervous/anxious.        Past Medical History    Past Medical History:   Diagnosis Date    Atrial flutter     Cancer     prostate    Congestive heart failure     Diabetes mellitus, type 2     Heart failure     right sided    Hyperlipidemia     Pulmonary fibrosis     Sleep apnea        Past Surgical History    Past Surgical History:   Procedure Laterality Date    ADENOIDECTOMY      CARDIAC PACEMAKER PLACEMENT      HERNIA REPAIR      left, umbilical    TONSILLECTOMY         Medications (scheduled):      bicalutamide  50 mg Oral Daily    cefTRIAXone (ROCEPHIN) IVPB  2 g Intravenous Q12H    clotrimazole-betamethasone 1-0.05%   Topical (Top) BID    escitalopram oxalate  10 mg Oral QHS    famotidine  20 mg Oral BID    insulin NPH  45 Units Subcutaneous BID    levothyroxine  75 mcg Oral Before breakfast    montelukast  10 mg Oral QHS    pirfenidone  801 mg Oral TID    predniSONE  40 mg Oral Daily    pregabalin  150 mg Oral QHS       Medications (infusions):         Medications (prn):     acetaminophen, acetaminophen, albuterol, bisacodyL, dextrose 50%, dextrose 50%, diphenhydrAMINE, glucagon (human recombinant), glucose, glucose, insulin aspart U-100, magnesium oxide, magnesium oxide, ondansetron, potassium, sodium phosphates, potassium, sodium phosphates, potassium, sodium phosphates, promethazine (PHENERGAN) IVPB, sodium chloride 0.9%    Family History:   Family  "History   Problem Relation Age of Onset    Heart disease Mother     Diabetes Mother     Hypertension Mother        Social History: Tobacco:   Social History     Tobacco Use   Smoking Status Former Smoker                                EtOH:   Social History     Substance and Sexual Activity   Alcohol Use None                                Drugs:   Social History     Substance and Sexual Activity   Drug Use Not on file                                Occupation: retired urologist                             Asbestos exposure: no    Physical Exam    Vital signs:  Temp:  [97.1 °F (36.2 °C)-98.6 °F (37 °C)]   Pulse:  [69-81]   Resp:  [16-24]   BP: (122-175)/(58-91)   SpO2:  [92 %-99 %]     Intake/Output:     Intake/Output Summary (Last 24 hours) at 8/31/2020 0911  Last data filed at 8/31/2020 0600  Gross per 24 hour   Intake 1200 ml   Output 3900 ml   Net -2700 ml        BMI: Estimated body mass index is 35.21 kg/m² as calculated from the following:    Height as of this encounter: 6' 4" (1.93 m).    Weight as of this encounter: 131.2 kg (289 lb 3.9 oz).    Physical Exam   Constitutional: He is oriented to person, place, and time. No distress.   Obese male, wearing BiPAP   HENT:   Head: Normocephalic and atraumatic.   Right Ear: External ear normal.   Left Ear: External ear normal.   Mouth/Throat: Oropharynx is clear and moist.   Wearing BiPAP   Eyes: Pupils are equal, round, and reactive to light. Conjunctivae are normal. Right eye exhibits no discharge. Left eye exhibits no discharge. No scleral icterus.   Neck: Normal range of motion. Neck supple. No JVD present. No tracheal deviation present. No thyromegaly present.   Cardiovascular: Normal rate, regular rhythm, normal heart sounds and intact distal pulses. Exam reveals no gallop and no friction rub.   No murmur heard.  Pulmonary/Chest: Effort normal. No stridor. No respiratory distress. He has no wheezes. He has rales (few posterior rales).   BiPAP  + bnronchial " BS RLL   Abdominal: Soft. Bowel sounds are normal. He exhibits no distension. There is no abdominal tenderness. There is no rebound.   obese   Musculoskeletal: Normal range of motion.         General: Edema present. No tenderness.   Lymphadenopathy:     He has no cervical adenopathy.   Neurological: He is alert and oriented to person, place, and time. GCS score is 15.   Some generalized weakness   Skin: Skin is warm and dry. No rash noted. He is not diaphoretic. No erythema.   Psychiatric: Mood, memory, affect and judgment normal.   Nursing note and vitals reviewed.      Laboratory    Recent Labs   Lab 08/31/20 0435   WBC 13.28*   RBC 4.71   HGB 15.6   HCT 46.5   *   MCV 99*   MCH 33.1*   MCHC 33.5       No results for input(s): GLUCOSE, CALCIUM, PROT, NA, K, CO2, CL, BUN, CREATININE, ALKPHOS, ALT, AST, BILITOT in the last 24 hours.    Invalid input(s):  ALBUMIN    No results for input(s): PT, INR, APTT in the last 24 hours.    No results for input(s): CPK, CPKMB, TROPONINI, MB in the last 24 hours.    Additional labs:     LA - 8 -- 4.8    Procalcitonin - 0.05    Covid - neg    UA - noted    Microbiology:       Microbiology Results (last 7 days)     Procedure Component Value Units Date/Time    Blood culture [[id number]] Collected: 08/28/20 0413    Order Status: Completed Specimen: Blood Updated: 08/31/20 0432     Blood Culture, Routine No Growth to date      No Growth to date      No Growth to date      No Growth to date    Blood culture [[id number]] Collected: 08/29/20 0316    Order Status: Completed Specimen: Blood Updated: 08/31/20 0432     Blood Culture, Routine No Growth to date      No Growth to date      No Growth to date    Blood culture x two cultures. Draw prior to antibiotics. [[id number]]  (Abnormal) Collected: 08/27/20 0030    Order Status: Completed Specimen: Blood from Peripheral, Forearm, Left Updated: 08/30/20 0728     Blood Culture, Routine Gram stain aer bottle: Gram negative rods       Results called to and read back by: Oriana Paz RN in MICU by GARIMA      08/27/2020  11:27      [facility]  For susceptibility see order #[id number]      Narrative:      Aerobic and anaerobic    Blood culture x two cultures. Draw prior to antibiotics. [[id number]]  (Abnormal)  (Susceptibility) Collected: 08/27/20 0026    Order Status: Completed Specimen: Blood from Peripheral, Forearm, Right Updated: 08/30/20 0728     Blood Culture, Routine Gram stain aer bottle: Gram negative rods      Results called to and read back by: Oriana Paz RN in MICU by GARIMA      08/27/2020  11:27      [facility]    Narrative:      Aerobic and anaerobic    Clostridium difficile EIA [[id number]] Collected: 08/28/20 2332    Order Status: Canceled Specimen: Stool           Radiology    Imaging Results          CT Chest Abdomen Pelvis With Contrast (Final result)  Result time 08/27/20 01:26:00    Final result by Zulma Cruz MD (08/27/20 01:26:00)                 Narrative:    EXAM:  CT Chest, Abdomen and Pelvis With Intravenous Contrast    CLINICAL HISTORY:  The patient is 87 years old and is Male; Sepsis    TECHNIQUE:  Axial computed tomography images of the chest, abdomen and pelvis with intravenous contrast.  Sagittal and coronal reformatted images were created and reviewed.  This CT exam was performed using one or more of the following dose reduction techniques:  automated exposure control, adjustment of the mA and/or kV according to patient size, and/or use of iterative reconstruction technique.    COMPARISON:  No relevant prior studies available.    FINDINGS:  CHEST:  LUNGS:  Patchy area of groundglass opacity within the right lower lobe with interlobular septal thickening is present. Dependent atelectasis within the lung bases is also noted.  PLEURAL SPACE:  Small moderate left pleural effusion is present.  No pneumothorax.  HEART:  No cardiomegaly.  No pericardial effusion.  MEDIASTINUM:  The  tracheobronchial tree is widely patent.    ABDOMEN:  LIVER:  Unremarkable.  No mass.  GALLBLADDER AND BILE DUCTS:  No calcified stones.  No ductal dilation.  PANCREAS:  No ductal dilation.  No mass.  SPLEEN:  Unremarkable.  ADRENALS:  Unremarkable.  No mass.  KIDNEYS AND URETERS:  Unremarkable. The kidneys enhance symmetrically. No obstructing renal or ureteral calculus is seen. No hydronephrosis or hydroureter. No perinephric fluid or stranding.  STOMACH AND BOWEL:  The stomach is well distended with food contents. The small bowel is normal in caliber. Stool is present throughout the colon. Scattered colonic diverticula are noted without surrounding inflammation. There is no bowel obstruction.    PELVIS:  APPENDIX:  No findings to suggest acute appendicitis.  BLADDER:  The bladder is nearly empty.  REPRODUCTIVE:  Unremarkable as visualized.    CHEST, ABDOMEN and PELVIS:  INTRAPERITONEAL SPACE:  Unremarkable.  No significant fluid collection.  No free air.  BONES/JOINTS:  Multilevel degenerative change of the spine is present.  SOFT TISSUES:  Postsurgical change of the left inguinal region is present.  VASCULATURE:  Atherosclerosis of the vasculature is present.  No aortic aneurysm.  LYMPH NODES:  Shotty mediastinal lymph nodes are present.  TUBES, LINES AND DEVICES:  A dual-lead left-sided pacemaker is present.    IMPRESSION:  1.  Left pleural effusion with associated bibasilar atelectasis and infectious process within the right lower lobe.  2.  Colonic diverticulosis.    Electronically signed by:  Zulma Cruz MD  8/27/2020 3:01 AM CDT Workstation: [id number]                             X-Ray Chest AP Portable (Final result)  Result time 08/27/20 00:39:02    Final result by Eduin Lam MD (08/27/20 00:39:02)                 Narrative:    HISTORY: Fever, cough, hemoptysis, suspected Covid 19 virus infection.    FINDINGS: Portable chest radiograph at 0041 hours compared to  07/15/2015 shows dual lead left  subclavian CCD with distal lead tips  unchanged in position in the right atrium and right ventricle. The  cardiomediastinal silhouette and pulmonary vasculature are normal,  with aortic vascular calcifications.    The lungs are symmetrically expanded, with scattered reticulonodular  and groundglass opacities in both lower lungs, right greater than  left. There is no dense consolidation, large pleural effusion, or  evidence of pulmonary edema. No pneumothorax or acute osseous  abnormality.    IMPRESSION: Scattered bilateral lower lung interstitial opacities  right greater than left, which could reflect Covid 19 pneumonia in the  appropriate clinical setting.    Electronically Signed by Eduin SMALLS on 8/27/2020 6:51 AM                              Additional Studies    EKG (8/27)  Vent. Rate : 070 BPM     Atrial Rate : 119 BPM      P-R Int : 000 ms          QRS Dur : 124 ms       QT Int : 402 ms       P-R-T Axes : 000 -88 083 degrees      QTc Int : 434 ms     Ventricular-paced rhythm   Abnormal ECG   No previous ECGs available    Ventilator Information    Oxygen Concentration (%):  [75-90] 90         No results for input(s): PH, PCO2, PO2, HCO3, POCSATURATED, BE in the last 72 hours.      Impression    Active Hospital Problems    Diagnosis  POA    *Pneumonia [J18.9]  Yes    Respiratory failure with hypoxia [J96.91]  Yes    History of pulmonary fibrosis [Z87.09]  Not Applicable    Gram-negative bacteremia [R78.81]  Yes    Hypothyroidism [E03.9]  Yes    Diabetes mellitus [E11.9]  Yes    Acute on chronic respiratory failure with hypoxia [J96.21]  Yes    Thrombocytopenia [D69.6]  Yes    Atrial flutter [I48.92]  Yes    Presence of cardiac pacemaker [Z95.0]  Yes    Pulmonary fibrosis [J84.10]  Yes     · UIP on ESBRIET since about 2015  · PFT (11/19) - TLC - 74%, DLCO - 80%      Obesity with body mass index 30 or greater [E66.9]  Yes      Resolved Hospital Problems    Diagnosis Date Resolved POA     Hemoptysis [R04.2] 08/30/2020 Yes    Sepsis [A41.9] 08/30/2020 Yes    Sleep apnea [G47.30] 08/28/2020 Yes       Plan    · Continue antibiotics  · BiPAP as needed - wean O2 as able, vapotherm as tolerated - wean O 2 as able, seems to be getting better  · + acinetobacter bacteremia  · Continue oral steroids  · Continue ESBRIET  · ELIQUIS held due to recurring hemoptysis  · Monitor platelets  · Consult PT, OT to work with pt        Thank you for this consult.  I will follow with you while the patient is hospitalized.  Please call ([phone]) if you have any questions.    Timo Ambrocio MD

## 2020-08-31 NOTE — PROGRESS NOTES
"[facility]  Adult Nutrition   Progress Note (Initial Assessment)     SUMMARY     Recommendations/Interventions:    Recommendation/Intervention: 1. Continue current diet as tolerated, encourage intake. 2.  to assist in meal choices daily.  Goals: 1. Patient to meet at least 75% of estimated needs via PO intake of meals.  Nutrition Goal Status: new    Dietitian Rounds Brief:  · Seen 2' f/u. Appetite and intake good. No issues with N/V/D. Patient requesting snacks for when his food gets cold and it cannot be heated up. Added snack preferences to computrition. No complaints at this time. Will continue to monitor intake, labs, and plan of care.    Reason for Assessment  Reason For Assessment: RD follow-up  Diagnosis: (pneumonia)  Relevant Medical History: sleep apnea, sepsis, pneumonia, diabetes mellitis, CHF, pulmonary fibrosis    Nutrition Risk Screen  Nutrition Risk Screen: no indicators present     Nutrition/Diet History  Food Allergies: NKFA  Factors Affecting Nutritional Intake: None identified at this time    Anthropometrics  Temp: 97.1 °F (36.2 °C)  Height Method: Stated  Height: 6' 4" (193 cm)  Height (inches): 76 in  Weight Method: Bed Scale  Weight: 131.2 kg (289 lb 3.9 oz)  Weight (lb): 289.25 lb  Ideal Body Weight (IBW), Male: 202 lb  % Ideal Body Weight, Male (lb): 143.19 %  BMI (Calculated): 35.2  BMI Grade: 35 - 39.9 - obesity - grade II     Weight History:  Wt Readings from Last 10 Encounters:   08/27/20 131.2 kg (289 lb 3.9 oz)   08/24/20 (P) 130.2 kg (287 lb)   09/23/19 124.7 kg (275 lb)   05/16/19 124.7 kg (275 lb)     Lab/Procedures/Meds: Pertinent Labs Reviewed  Clinical Chemistry:  Recent Labs   Lab 08/27/20  0024 08/28/20  0413 08/29/20  0316 08/30/20  0429 08/31/20  0435    137 139 138  --    K 4.1 4.2 3.8 3.7  --    CL 99 103 106 102  --    CO2 23 26 25 23  --    * 169* 78 130*  --    BUN 26* 30* 29* 36*  --    CREATININE 1.4 1.2 1.1 1.1  --    CALCIUM 9.5 8.6* " 9.1 9.1  --    PROT 6.8 5.3*  --   --   --    ALBUMIN 4.7 3.5  --   --   --    BILITOT 1.4* 1.8*  --   --   --    ALKPHOS 80 41*  --   --   --    AST 33 18  --   --   --    ALT 39 24  --   --   --    ANIONGAP 18* 8 8 13  --    ESTGFRAFRICA 51.9* >60.0 >60.0 >60.0  --    EGFRNONAA 44.9* 54.0* >60.0 >60.0  --    MG 1.6 1.8 1.9 1.9 1.9   PHOS  --  3.1 2.3* 3.2 3.9   LIPASE 48  --   --   --   --     < > = values in this interval not displayed.     CBC:   Recent Labs   Lab 08/31/20  0435   WBC 13.28*   RBC 4.71   HGB 15.6   HCT 46.5   *   MCV 99*   MCH 33.1*   MCHC 33.5     Cardiac Profile:  Recent Labs   Lab 08/27/20  0024   *   CPK 55   TROPONINI 0.036     Inflammatory Labs:  Recent Labs   Lab 08/27/20  0024   CRP 0.25     Medications: Pertinent Medications reviewed  Scheduled Meds:   bicalutamide  50 mg Oral Daily    cefTRIAXone (ROCEPHIN) IVPB  2 g Intravenous Q12H    clotrimazole-betamethasone 1-0.05%   Topical (Top) BID    escitalopram oxalate  10 mg Oral QHS    famotidine  20 mg Oral BID    insulin NPH  45 Units Subcutaneous BID    levothyroxine  75 mcg Oral Before breakfast    montelukast  10 mg Oral QHS    pirfenidone  801 mg Oral TID    predniSONE  40 mg Oral Daily    pregabalin  150 mg Oral QHS     Continuous Infusions:  PRN Meds:.acetaminophen, acetaminophen, albuterol, bisacodyL, dextrose 50%, dextrose 50%, diphenhydrAMINE, glucagon (human recombinant), glucose, glucose, insulin aspart U-100, magnesium oxide, magnesium oxide, ondansetron, potassium, sodium phosphates, potassium, sodium phosphates, potassium, sodium phosphates, promethazine (PHENERGAN) IVPB, sodium chloride 0.9%    Antibiotics (From admission, onward)    Start     Stop Route Frequency Ordered    08/30/20 1430  cefTRIAXone (ROCEPHIN) 2 g/50 mL D5W IVPB      -- IV Every 12 hours (non-standard times) 08/30/20 1316        Estimated/Assessed Needs    Weight Used For Calorie Calculations: 131.2 kg (289 lb 3.9 oz)  Energy  Calorie Requirements (kcal): 1443 - 1837 kcals/day (11 - 14 kcal/kg)  Energy Need Method: Kcal/kg  Protein Requirements: 138 g/day  (1.5 g/kg IBW)    Nutrition Prescription Ordered    Current Diet Order: Cardiac/Diabetic; Easy to Chew    Evaluation of Received Nutrient/Fluid Intake    Energy Calories Required: meeting needs  Protein Required: meeting needs  Fluid Required: meeting needs  Tolerance: tolerating  % Intake of Estimated Energy Needs: 75 - 100 %  % Meal Intake: 75 - 100 %    Intake/Output Summary (Last 24 hours) at 8/31/2020 0956  Last data filed at 8/31/2020 0600  Gross per 24 hour   Intake 1200 ml   Output 3900 ml   Net -2700 ml      Nutrition Risk    Level of Risk/Frequency of Follow-up: moderate - high   Monitor and Evaluation    Food and Nutrient Intake: energy intake, food and beverage intake  Food and Nutrient Adminstration: diet order  Physical Activity and Function: nutrition-related ADLs and IADLs, factors affecting access to physical activity  Anthropometric Measurements: weight, weight change, body mass index  Biochemical Data, Medical Tests and Procedures: electrolyte and renal panel, lipid profile, gastrointestinal profile, glucose/endocrine profile, inflammatory profile  Nutrition-Focused Physical Findings: overall appearance     Nutrition Follow-Up    RD Follow-up?: Yes  Latoya Lee RD 08/31/2020 10:49 AM

## 2020-08-31 NOTE — PT/OT/SLP EVAL
Physical Therapy Evaluation    Patient Name:  Manuel Casas   MRN:  [id number]    Recommendations:     Discharge Recommendations:  home health PT   Discharge Equipment Recommendations:     Barriers to discharge: None    Assessment:     Manuel Casas is a 87 y.o. male admitted with a medical diagnosis of Pneumonia.  He presents with the following impairments/functional limitations:  weakness, impaired endurance, impaired cardiopulmonary response to activity, gait instability, impaired functional mobilty .  Pt's functional mobility is limited due to  increased 02 needs, presently on vapotherm  35LPM.  Did tolerate marching in place with minimal change in 02 saturation.     Rehab Prognosis: Fair; patient would benefit from acute skilled PT services to address these deficits and reach maximum level of function.    Recent Surgery: * No surgery found *      Plan:     During this hospitalization, patient to be seen daily to address the identified rehab impairments via gait training, therapeutic activities, therapeutic exercises and progress toward the following goals:    · Plan of Care Expires:  09/30/20    Subjective     Chief Complaint: dyspnea  Patient/Family Comments/goals: increased activity level.  Pain/Comfort:  ·      Patients cultural, spiritual, Orthodox conflicts given the current situation:      Living Environment:  Pt lives with his wife in an elevated house with 8 entry steps with power  chair for negotiating steps  Prior to admission, patients level of function was Modified independent .  Equipment used at home: walker, rolling, wheelchair(elevated toilet seat , electric chair for steps).  DME owned (not currently used): none.  Upon discharge, patient will have assistance from his wife.    Objective:     Communicated with nurse prior to session.  Patient found up in chair with oxygen, telemetry  upon PT entry to room.    General Precautions: Standard, fall   Orthopedic Precautions:    Braces:        Exams:  · Cognitive Exam:  Patient is oriented to Person, Place, Time and Situation  · RUE Strength: WNL  · LUE Strength: WNL  · RLE ROM: WNL  · RLE Strength: WNL  · LLE ROM: WNL  · LLE Strength: WNL    Functional Mobility:  · Transfers:     · Sit to Stand:  stand by assistance with rolling walker  · Gait: few steps forward and backward RW and SBA  · Balance: standing balance good/fair+    Therapeutic Activities and Exercises:   Pt was educated on role of PT. Pt readily t/f'ed to stand with SBA , took  A few steps with RW and SBA with close monitoring of 02 sat 98% to 88% mostly in low 90's. Marched in place.    AM-PAC 6 CLICK MOBILITY  Total Score:17     Patient left up in chair with all lines intact, call button in reach and wife present.    GOALS:   Multidisciplinary Problems     Physical Therapy Goals        Problem: Physical Therapy Goal    Goal Priority Disciplines Outcome Goal Variances Interventions   Physical Therapy Goal     PT, PT/OT      Description: Goals to be met by:D/C    Patient will increase functional independence with mobility by performin. Supine to sit with Modified [address]  2. Sit to stand transfer with Modified [address]  3. Gait  x 50  feet with Supervision using Rolling Walker.                      History:     Past Medical History:   Diagnosis Date    Atrial flutter     Cancer     prostate    Congestive heart failure     Diabetes mellitus, type 2     Heart failure     right sided    Hyperlipidemia     Pulmonary fibrosis     Sleep apnea        Past Surgical History:   Procedure Laterality Date    ADENOIDECTOMY      CARDIAC PACEMAKER PLACEMENT      HERNIA REPAIR      left, umbilical    TONSILLECTOMY         Time Tracking:     PT Received On: 20  PT Start Time: 1115     PT Stop Time: 1140  PT Total Time (min): 25 min     Billable Minutes: Evaluation 10 minutes and Therapeutic Activity 15 minutes      Corinne Putnam, PT  2020

## 2020-08-31 NOTE — PLAN OF CARE
Important Message from Medicare was sign, explained and given to patient/caregiver on 08/31/2020 at 3:51pm

## 2020-08-31 NOTE — PROGRESS NOTES
[facility] Medicine  Progress Note    Patient name: Manuel Casas  MRN: [id number]  Admit Date: 8/26/2020   LOS: 4 days     SUBJECTIVE:     Principal problem: Pneumonia    Interval History:  Patient was seen and examined bedside.  His hemoptysis and nose bleed have resolved.  Overnight nursing staff put him on a BiPAP with 80% FiO2 for unknown reason.  We put him back on 35 L, 75% FiO2 Vapotherm and he is breathing comfortably and saturating in mid 90s. Otherwise hemodynamically stable    Scheduled Meds:   bicalutamide  50 mg Oral Daily    cefTRIAXone (ROCEPHIN) IVPB  2 g Intravenous Q12H    clotrimazole-betamethasone 1-0.05%   Topical (Top) BID    escitalopram oxalate  10 mg Oral QHS    famotidine  20 mg Oral BID    insulin NPH  35 Units Subcutaneous BID    levothyroxine  75 mcg Oral Before breakfast    montelukast  10 mg Oral QHS    pirfenidone  801 mg Oral TID    [START ON 9/1/2020] predniSONE  30 mg Oral Daily    pregabalin  150 mg Oral QHS     Continuous Infusions:  PRN Meds:acetaminophen, acetaminophen, albuterol, bisacodyL, dextrose 50%, dextrose 50%, diphenhydrAMINE, glucagon (human recombinant), glucose, glucose, insulin aspart U-100, magnesium oxide, magnesium oxide, ondansetron, potassium, sodium phosphates, potassium, sodium phosphates, potassium, sodium phosphates, promethazine (PHENERGAN) IVPB, sodium chloride 0.9%    Review of patient's allergies indicates:  No Known Allergies    Review of Systems: As per interval history    OBJECTIVE:     Vital Signs (Most Recent)  Temp: 98.5 °F (36.9 °C) (08/31/20 1140)  Pulse: 70 (08/31/20 1140)  Resp: 18 (08/31/20 1140)  BP: 136/75 (08/31/20 1140)  SpO2: (!) 94 % (08/31/20 1140)    Vital Signs Range (Last 24H):  Temp:  [97.1 °F (36.2 °C)-98.5 °F (36.9 °C)]   Pulse:  [69-81]   Resp:  [16-27]   BP: (136-175)/(63-91)   SpO2:  [90 %-99 %]     I & O (Last 24H):    Intake/Output Summary (Last 24 hours) at 8/31/2020 1529  Last data  filed at 8/31/2020 0600  Gross per 24 hour   Intake 550 ml   Output 1800 ml   Net -1250 ml       Physical Exam:  General:  Sitting in the chair. Appears as stated age. Calm, obese  Eyes: No conjunctival injection. No scleral icterus.  ENT: Hearing grossly intact. No discharge from ears. No nasal discharge.   Neck: Supple, trachea midline. No JVD  CVS: RRR. No LE edema BL  Lungs:  Remains on Vapotherm with 75% FiO2, bilateral basal crackles noted.  No accessory muscle use.   Abdomen:  Soft, nontender and nondistended.  No organomegaly  Neuro: AOx3. Moves all extremities. Follows commands. Responds appropriately   Skin:  No rash or erythema noted    Laboratory:  CBC:   Recent Labs   Lab 08/31/20  0435   WBC 13.28*   RBC 4.71   HGB 15.6   HCT 46.5   *   MCV 99*   MCH 33.1*   MCHC 33.5     CMP:   Recent Labs   Lab 08/28/20  0413  08/31/20  0435   *   < > 144*   CALCIUM 8.6*   < > 9.0   ALBUMIN 3.5  --   --    PROT 5.3*  --   --       < > 139   K 4.2   < > 3.7   CO2 26   < > 24      < > 105   BUN 30*   < > 35*   CREATININE 1.2   < > 1.2   ALKPHOS 41*  --   --    ALT 24  --   --    AST 18  --   --    BILITOT 1.8*  --   --     < > = values in this interval not displayed.       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    Active Hospital Problems    Diagnosis  POA    *Pneumonia [J18.9]  Yes    Respiratory failure with hypoxia [J96.91]  Yes    History of pulmonary fibrosis [Z87.09]  Not Applicable    Gram-negative bacteremia [R78.81]  Yes    Hypothyroidism [E03.9]  Yes    Diabetes mellitus [E11.9]  Yes    Acute on chronic respiratory failure with hypoxia [J96.21]  Yes    Thrombocytopenia [D69.6]  Yes    Atrial  flutter [I48.92]  Yes    Presence of cardiac pacemaker [Z95.0]  Yes    Pulmonary fibrosis [J84.10]  Yes     · UIP on ESBRIET since about 2015  · PFT (11/19) - TLC - 74%, DLCO - 80%      Obesity with body mass index 30 or greater [E66.9]  Yes      Resolved Hospital Problems    Diagnosis Date Resolved POA    Hemoptysis [R04.2] 08/30/2020 Yes    Sepsis [A41.9] 08/30/2020 Yes    Sleep apnea [G47.30] 08/28/2020 Yes         Plan:   Overall clinical picture is consistent with sepsis due to Gram-negative bacterial pneumonia in the background of lung fibrosis and chronic hypoxic respiratory failure. Still remains on significant oxygen via Vapotherm(75% FiO2).  Blood cultures are growing Acinetobacter.     Overall very slow recovery due to pre-existing lung fibrosis and poor functional status    Resume low-dose Eliquis     Continue Rocephin based on susceptibility     Trial of 20 mg IV Lasix with KCL    Wean oxygen as tolerated    Continue p.o. steroids    CPAP q.h.s.    Accu-Cheks, sliding scale insulin    Bronchodilators as needed    Follow-up on 2D echo    PT, OT consult    Encourage incentive spirometer, out of bed to chair      VTE Risk Mitigation (From admission, onward)         Ordered     Reason for No Pharmacological VTE Prophylaxis  Once     Question:  Reasons:  Answer:  Risk of Bleeding    08/27/20 0341     IP VTE HIGH RISK PATIENT  Once      08/27/20 0341     Place sequential compression device  Until discontinued      08/27/20 0341                  Department Hospital Medicine  [facility]  Kael Keith MD  Date of service: 08/31/2020

## 2020-08-31 NOTE — PLAN OF CARE
08/31/20 1020   Patient Assessment/Suction   Level of Consciousness (AVPU) alert   Respiratory Effort Normal;Unlabored   Expansion/Accessory Muscles/Retractions expansion symmetric   All Lung Fields Breath Sounds clear   PRE-TX-O2   O2 Device (Oxygen Therapy) Vapotherm   $ Is the patient on High Flow Oxygen? Yes   Humidification temp set 33   Humidification temp actual 33   Flow (L/min) 35   Oxygen Concentration (%) 70   SpO2 (!) 90 %   Pulse Oximetry Type Continuous   Pulse 70   Resp (!) 27   Ready to Wean/Extubation Screen   FIO2<=50 (chart decimal) (!) 0.7   Respiratory Evaluation   $ Care Plan Tech Time 30 min

## 2020-09-01 LAB
ALBUMIN SERPL BCP-MCNC: 3.5 G/DL (ref 3.5–5.2)
ALP SERPL-CCNC: 53 U/L (ref 55–135)
ALT SERPL W/O P-5'-P-CCNC: 22 U/L (ref 10–44)
ANION GAP SERPL CALC-SCNC: 10 MMOL/L (ref 8–16)
ANISOCYTOSIS BLD QL SMEAR: SLIGHT
AST SERPL-CCNC: 18 U/L (ref 10–40)
BASOPHILS NFR BLD: 0 % (ref 0–1.9)
BILIRUB SERPL-MCNC: 0.9 MG/DL (ref 0.1–1)
BUN SERPL-MCNC: 35 MG/DL (ref 8–23)
CALCIUM SERPL-MCNC: 8.8 MG/DL (ref 8.7–10.5)
CHLORIDE SERPL-SCNC: 104 MMOL/L (ref 95–110)
CO2 SERPL-SCNC: 23 MMOL/L (ref 23–29)
CREAT SERPL-MCNC: 1.1 MG/DL (ref 0.5–1.4)
DIFFERENTIAL METHOD: ABNORMAL
EOSINOPHIL NFR BLD: 0 % (ref 0–8)
ERYTHROCYTE [DISTWIDTH] IN BLOOD BY AUTOMATED COUNT: 14.1 % (ref 11.5–14.5)
EST. GFR  (AFRICAN AMERICAN): >60 ML/MIN/1.73 M^2
EST. GFR  (NON AFRICAN AMERICAN): >60 ML/MIN/1.73 M^2
GLUCOSE SERPL-MCNC: 116 MG/DL (ref 70–110)
GLUCOSE SERPL-MCNC: 122 MG/DL (ref 70–110)
GLUCOSE SERPL-MCNC: 165 MG/DL (ref 70–110)
GLUCOSE SERPL-MCNC: 237 MG/DL (ref 70–110)
GLUCOSE SERPL-MCNC: 250 MG/DL (ref 70–110)
HCT VFR BLD AUTO: 46 % (ref 40–54)
HGB BLD-MCNC: 15.6 G/DL (ref 14–18)
IMM GRANULOCYTES # BLD AUTO: ABNORMAL K/UL (ref 0–0.04)
IMM GRANULOCYTES NFR BLD AUTO: ABNORMAL % (ref 0–0.5)
LYMPHOCYTES NFR BLD: 56 % (ref 18–48)
MAGNESIUM SERPL-MCNC: 1.9 MG/DL (ref 1.6–2.6)
MCH RBC QN AUTO: 33.6 PG (ref 27–31)
MCHC RBC AUTO-ENTMCNC: 33.9 G/DL (ref 32–36)
MCV RBC AUTO: 99 FL (ref 82–98)
MONOCYTES NFR BLD: 3 % (ref 4–15)
NEUTROPHILS NFR BLD: 39 % (ref 38–73)
NEUTS BAND NFR BLD MANUAL: 2 %
NRBC BLD-RTO: 0 /100 WBC
PHOSPHATE SERPL-MCNC: 3.7 MG/DL (ref 2.7–4.5)
PLATELET # BLD AUTO: 111 K/UL (ref 150–350)
PMV BLD AUTO: 11.1 FL (ref 9.2–12.9)
POTASSIUM SERPL-SCNC: 4 MMOL/L (ref 3.5–5.1)
PROT SERPL-MCNC: 5.7 G/DL (ref 6–8.4)
RBC # BLD AUTO: 4.64 M/UL (ref 4.6–6.2)
SMUDGE CELLS BLD QL SMEAR: PRESENT
SODIUM SERPL-SCNC: 137 MMOL/L (ref 136–145)
WBC # BLD AUTO: 13.42 K/UL (ref 3.9–12.7)

## 2020-09-01 PROCEDURE — 25000003 PHARM REV CODE 250: Performed by: HOSPITALIST

## 2020-09-01 PROCEDURE — 63600175 PHARM REV CODE 636 W HCPCS: Performed by: HOSPITALIST

## 2020-09-01 PROCEDURE — 36415 COLL VENOUS BLD VENIPUNCTURE: CPT

## 2020-09-01 PROCEDURE — 99900035 HC TECH TIME PER 15 MIN (STAT)

## 2020-09-01 PROCEDURE — 94761 N-INVAS EAR/PLS OXIMETRY MLT: CPT

## 2020-09-01 PROCEDURE — 27000221 HC OXYGEN, UP TO 24 HOURS

## 2020-09-01 PROCEDURE — 97535 SELF CARE MNGMENT TRAINING: CPT

## 2020-09-01 PROCEDURE — 84100 ASSAY OF PHOSPHORUS: CPT

## 2020-09-01 PROCEDURE — 99232 SBSQ HOSP IP/OBS MODERATE 35: CPT | Mod: ,,, | Performed by: INTERNAL MEDICINE

## 2020-09-01 PROCEDURE — 99232 PR SUBSEQUENT HOSPITAL CARE,LEVL II: ICD-10-PCS | Mod: ,,, | Performed by: INTERNAL MEDICINE

## 2020-09-01 PROCEDURE — 94660 CPAP INITIATION&MGMT: CPT

## 2020-09-01 PROCEDURE — 82962 GLUCOSE BLOOD TEST: CPT

## 2020-09-01 PROCEDURE — 21400001 HC TELEMETRY ROOM

## 2020-09-01 PROCEDURE — 97116 GAIT TRAINING THERAPY: CPT | Mod: CQ

## 2020-09-01 PROCEDURE — 80053 COMPREHEN METABOLIC PANEL: CPT

## 2020-09-01 PROCEDURE — 85027 COMPLETE CBC AUTOMATED: CPT

## 2020-09-01 PROCEDURE — 85007 BL SMEAR W/DIFF WBC COUNT: CPT

## 2020-09-01 PROCEDURE — 27100171 HC OXYGEN HIGH FLOW UP TO 24 HOURS

## 2020-09-01 PROCEDURE — 63600175 PHARM REV CODE 636 W HCPCS: Performed by: INTERNAL MEDICINE

## 2020-09-01 PROCEDURE — 97166 OT EVAL MOD COMPLEX 45 MIN: CPT

## 2020-09-01 PROCEDURE — 97530 THERAPEUTIC ACTIVITIES: CPT | Mod: CQ

## 2020-09-01 PROCEDURE — 83735 ASSAY OF MAGNESIUM: CPT

## 2020-09-01 RX ADMIN — PREDNISONE 30 MG: 5 TABLET ORAL at 09:09

## 2020-09-01 RX ADMIN — PREGABALIN 150 MG: 75 CAPSULE ORAL at 09:09

## 2020-09-01 RX ADMIN — PIRFENIDONE 801 MG: 801 TABLET, FILM COATED ORAL at 09:09

## 2020-09-01 RX ADMIN — CEFTRIAXONE 2 G: 2 INJECTION, SOLUTION INTRAVENOUS at 03:09

## 2020-09-01 RX ADMIN — FAMOTIDINE 20 MG: 20 TABLET ORAL at 09:09

## 2020-09-01 RX ADMIN — HUMAN INSULIN 35 UNITS: 100 INJECTION, SUSPENSION SUBCUTANEOUS at 09:09

## 2020-09-01 RX ADMIN — INSULIN ASPART 1 UNITS: 100 INJECTION, SOLUTION INTRAVENOUS; SUBCUTANEOUS at 09:09

## 2020-09-01 RX ADMIN — PIRFENIDONE 801 MG: 801 TABLET, FILM COATED ORAL at 02:09

## 2020-09-01 RX ADMIN — MONTELUKAST SODIUM 10 MG: 10 TABLET, COATED ORAL at 09:09

## 2020-09-01 RX ADMIN — BICALUTAMIDE 50 MG: 50 TABLET, FILM COATED ORAL at 09:09

## 2020-09-01 RX ADMIN — CLOTRIMAZOLE AND BETAMETHASONE DIPROPIONATE: 10; .5 CREAM TOPICAL at 09:09

## 2020-09-01 RX ADMIN — CEFTRIAXONE 2 G: 2 INJECTION, SOLUTION INTRAVENOUS at 02:09

## 2020-09-01 RX ADMIN — ESCITALOPRAM OXALATE 10 MG: 10 TABLET ORAL at 09:09

## 2020-09-01 RX ADMIN — LEVOTHYROXINE SODIUM 75 MCG: 25 TABLET ORAL at 08:09

## 2020-09-01 NOTE — PLAN OF CARE
"   09/01/20 1137   Discharge Assessment   Assessment Type Discharge Planning Reassessment     Pts wife asked her nurse Mr Pryor to have the CM call her, called the pt's room and talked with Mrs Aguusto and she asking about an oversized bedside commode, has [employer] and pt wt is 298 lbs and 6'4", and discussed with her that pt needs to be 300 # or more for an oversized BSC, and not due to pt hgt as she asking for it due to 6'4," and explained to her it goes by pt wt, and she then verbalized an understanding. Will follow for DC planning Needs.  "

## 2020-09-01 NOTE — PLAN OF CARE
09/01/20 0800   Patient Assessment/Suction   Level of Consciousness (AVPU) responds to voice   Respiratory Effort Normal;Unlabored   Expansion/Accessory Muscles/Retractions no use of accessory muscles   All Lung Fields Breath Sounds clear   Rhythm/Pattern, Respiratory unlabored   PRE-TX-O2   O2 Device (Oxygen Therapy) BiPAP   $ Is the patient on Low Flow Oxygen? Yes   Flow (L/min) 20   Oxygen Concentration (%) 70   SpO2 (!) 93 %   Pulse Oximetry Type Continuous   $ Pulse Oximetry - Multiple Charge Pulse Oximetry - Multiple   Pulse 66   Resp 18   Temp 97.7 °F (36.5 °C)   /72   Inhaler   $ Inhaler Charges PRN treatment not required   Wound Care   Area of Concern Bridge of nose   Skin Color/Characteristics without discoloration   Skin Temperature warm   Ready to Wean/Extubation Screen   FIO2<=50 (chart decimal) (!) 0.7   Preset CPAP/BiPAP Settings   Mode Of Delivery BiPAP S/T   $ CPAP/BiPAP Daily Charge BiPAP/CPAP Daily   $ Is patient using? Yes   Equipment Type V60   Ipap 20   EPAP (cm H2O) 10   Pressure Support (cm H2O) 10   Set Rate (Breaths/Min) 18   continue to alternate bipap and vapotherm with prn mdi

## 2020-09-01 NOTE — PROGRESS NOTES
"Pulmonary/Critical Care Progress Note      Patient name: Manuel Casas  MRN: [id number]  Date: 09/01/2020    Admit Date: 8/26/2020  Consult Requested By: Kael Keith MD    Reason for Consult: REspiratory failure, pneumonia, pulm fibrosis    HPI:    8/27/2020 - 86 yo male who I met 2 days ago has h/o pulmonary fibrosis (fairly mild but progressive, on ESBRIET, home O2) had recent respiratory illness treated at Urgent Care and when I saw him he was stablle but with some increased dyspnea and I placed him on a short prednisone taper.  Yesterday during the day he felt well, SOB was better and he was active.  That evening he had an episode of chills and rigors and coughed up some bloody sputum.  He then began to develop fever and came to ER.  Covid test was negative and xrays show a RLL infiltrate c/w CAP.  He has needed BiPAP with 100% O2 and is now admitted for further treatment.  He states that he would prefer to not be intubated unless "absolutely necessary".  ROS as below.  He has not had any corona virus exposures and has been practicing social distancing.    8/31/2020 - Events of the weekend noted, no new issues reported and has been transferred to floor.  Doing better but still with high FiO2 requirements with both vapotherm and BiPAP.  He states that he feels better overall.  CXR shows some clearing at right lung base.    9/1/2020 - Stable overnight, pt sleeping with BiPAP when I saw him (I didn't waken pt).  No new issues reported.  He is still requiring significant O2 (70% vapotherm) and cannot be DC home yet.  Cumulative I/O are negative about 3.3 liters    Review of Systems    Review of Systems   Constitutional: Positive for chills, fever and malaise/fatigue. Negative for diaphoresis and weight loss.   HENT: Negative for congestion, nosebleeds and sinus pain.    Eyes: Negative for pain.   Respiratory: Positive for cough, hemoptysis, sputum production and shortness of breath. Negative for wheezing and " stridor.    Cardiovascular: Positive for leg swelling (chronic and not worse). Negative for chest pain, palpitations, orthopnea, claudication and PND.   Gastrointestinal: Positive for diarrhea. Negative for abdominal pain, blood in stool, constipation, heartburn, nausea and vomiting.   Genitourinary: Negative for dysuria, frequency, hematuria and urgency.   Musculoskeletal: Negative for back pain, falls, joint pain, myalgias and neck pain.   Skin: Negative for itching and rash.   Neurological: Positive for weakness (some generalized). Negative for dizziness, tingling, tremors, sensory change, speech change, focal weakness, seizures, loss of consciousness and headaches.   Psychiatric/Behavioral: Negative for depression, substance abuse and suicidal ideas. The patient is not nervous/anxious.        Past Medical History    Past Medical History:   Diagnosis Date    Atrial flutter     Cancer     prostate    Congestive heart failure     Diabetes mellitus, type 2     Heart failure     right sided    Hyperlipidemia     Pulmonary fibrosis     Sleep apnea        Past Surgical History    Past Surgical History:   Procedure Laterality Date    ADENOIDECTOMY      CARDIAC PACEMAKER PLACEMENT      HERNIA REPAIR      left, umbilical    TONSILLECTOMY         Medications (scheduled):      bicalutamide  50 mg Oral Daily    cefTRIAXone (ROCEPHIN) IVPB  2 g Intravenous Q12H    clotrimazole-betamethasone 1-0.05%   Topical (Top) BID    escitalopram oxalate  10 mg Oral QHS    famotidine  20 mg Oral BID    insulin NPH  35 Units Subcutaneous BID    levothyroxine  75 mcg Oral Before breakfast    montelukast  10 mg Oral QHS    pirfenidone  801 mg Oral TID    predniSONE  30 mg Oral Daily    pregabalin  150 mg Oral QHS       Medications (infusions):         Medications (prn):     acetaminophen, acetaminophen, albuterol, bisacodyL, dextrose 50%, dextrose 50%, diphenhydrAMINE, glucagon (human recombinant), glucose, glucose,  "insulin aspart U-100, magnesium oxide, magnesium oxide, ondansetron, potassium, sodium phosphates, potassium, sodium phosphates, potassium, sodium phosphates, promethazine (PHENERGAN) IVPB, sodium chloride 0.9%    Family History:   Family History   Problem Relation Age of Onset    Heart disease Mother     Diabetes Mother     Hypertension Mother        Social History: Tobacco:   Social History     Tobacco Use   Smoking Status Former Smoker                                EtOH:   Social History     Substance and Sexual Activity   Alcohol Use None                                Drugs:   Social History     Substance and Sexual Activity   Drug Use Not on file                                Occupation: retired urologist                             Asbestos exposure: no    Physical Exam    Vital signs:  Temp:  [97.7 °F (36.5 °C)-99.1 °F (37.3 °C)]   Pulse:  []   Resp:  [15-39]   BP: (103-161)/(55-74)   SpO2:  [88 %-96 %]     Intake/Output:     Intake/Output Summary (Last 24 hours) at 9/1/2020 1245  Last data filed at 9/1/2020 0850  Gross per 24 hour   Intake 925 ml   Output 2200 ml   Net -1275 ml        BMI: Estimated body mass index is 35.21 kg/m² as calculated from the following:    Height as of this encounter: 6' 4" (1.93 m).    Weight as of this encounter: 131.2 kg (289 lb 3.9 oz).    Physical Exam   Constitutional: He is oriented to person, place, and time. No distress.   Obese male, wearing BiPAP   HENT:   Head: Normocephalic and atraumatic.   Right Ear: External ear normal.   Left Ear: External ear normal.   Mouth/Throat: Oropharynx is clear and moist.   Wearing BiPAP   Eyes: Pupils are equal, round, and reactive to light. Conjunctivae are normal. Right eye exhibits no discharge. Left eye exhibits no discharge. No scleral icterus.   Neck: Normal range of motion. Neck supple. No JVD present. No tracheal deviation present. No thyromegaly present.   Cardiovascular: Normal rate, regular rhythm, normal heart " sounds and intact distal pulses. Exam reveals no gallop and no friction rub.   No murmur heard.  Pulmonary/Chest: Effort normal. No stridor. No respiratory distress. He has no wheezes. He has rales (few posterior rales).   BiPAP  + bnronchial BS RLL   Abdominal: Soft. Bowel sounds are normal. He exhibits no distension. There is no abdominal tenderness. There is no rebound.   obese   Musculoskeletal: Normal range of motion.         General: Edema present. No tenderness.   Lymphadenopathy:     He has no cervical adenopathy.   Neurological: He is alert and oriented to person, place, and time. GCS score is 15.   Some generalized weakness   Skin: Skin is warm and dry. No rash noted. He is not diaphoretic. No erythema.   Psychiatric: Mood, memory, affect and judgment normal.   Nursing note and vitals reviewed.      Laboratory    Recent Labs   Lab 09/01/20 0407   WBC 13.42*   RBC 4.64   HGB 15.6   HCT 46.0   *   MCV 99*   MCH 33.6*   MCHC 33.9       Recent Labs   Lab 09/01/20 0407   CALCIUM 8.8   PROT 5.7*      K 4.0   CO2 23      BUN 35*   CREATININE 1.1   ALKPHOS 53*   ALT 22   AST 18   BILITOT 0.9       No results for input(s): PT, INR, APTT in the last 24 hours.    No results for input(s): CPK, CPKMB, TROPONINI, MB in the last 24 hours.    Additional labs:     LA - 8 -- 4.8    Procalcitonin - 0.05    Covid - neg    UA - noted    Microbiology:       Microbiology Results (last 7 days)     Procedure Component Value Units Date/Time    Blood culture [[id number]] Collected: 08/29/20 0316    Order Status: Completed Specimen: Blood Updated: 09/01/20 0432     Blood Culture, Routine No Growth to date      No Growth to date      No Growth to date      No Growth to date    Blood culture [[id number]] Collected: 08/28/20 0413    Order Status: Completed Specimen: Blood Updated: 09/01/20 0432     Blood Culture, Routine No Growth to date      No Growth to date      No Growth to date      No Growth to date      No  Growth to date    Blood culture x two cultures. Draw prior to antibiotics. [[id number]]  (Abnormal) Collected: 08/27/20 0030    Order Status: Completed Specimen: Blood from Peripheral, Forearm, Left Updated: 08/30/20 0728     Blood Culture, Routine Gram stain aer bottle: Gram negative rods      Results called to and read back by: Oriana Paz RN in MICU by GARIMA      08/27/2020  11:27      [facility]  For susceptibility see order #[id number]      Narrative:      Aerobic and anaerobic    Blood culture x two cultures. Draw prior to antibiotics. [[id number]]  (Abnormal)  (Susceptibility) Collected: 08/27/20 0026    Order Status: Completed Specimen: Blood from Peripheral, Forearm, Right Updated: 08/30/20 0728     Blood Culture, Routine Gram stain aer bottle: Gram negative rods      Results called to and read back by: Oriana Paz RN in MICU by GARIMA      08/27/2020  11:27      [facility]    Narrative:      Aerobic and anaerobic    Clostridium difficile EIA [[id number]] Collected: 08/28/20 2332    Order Status: Canceled Specimen: Stool           Radiology        Additional Studies    EKG (8/27)  Vent. Rate : 070 BPM     Atrial Rate : 119 BPM      P-R Int : 000 ms          QRS Dur : 124 ms       QT Int : 402 ms       P-R-T Axes : 000 -88 083 degrees      QTc Int : 434 ms     Ventricular-paced rhythm   Abnormal ECG   No previous ECGs available    Ventilator Information    Oxygen Concentration (%):  [65-70] 70         No results for input(s): PH, PCO2, PO2, HCO3, POCSATURATED, BE in the last 72 hours.      Impression    Active Hospital Problems    Diagnosis  POA    *Pneumonia [J18.9]  Yes    Respiratory failure with hypoxia [J96.91]  Yes    History of pulmonary fibrosis [Z87.09]  Not Applicable    Gram-negative bacteremia [R78.81]  Yes    Hypothyroidism [E03.9]  Yes    Diabetes mellitus [E11.9]  Yes    Acute on chronic respiratory failure with hypoxia [J96.21]  Yes    Thrombocytopenia [D69.6]   Yes    Atrial flutter [I48.92]  Yes    Presence of cardiac pacemaker [Z95.0]  Yes    Pulmonary fibrosis [J84.10]  Yes     · UIP on ESBRIET since about 2015  · PFT (11/19) - TLC - 74%, DLCO - 80%      Obesity with body mass index 30 or greater [E66.9]  Yes      Resolved Hospital Problems    Diagnosis Date Resolved POA    Hemoptysis [R04.2] 08/30/2020 Yes    Sepsis [A41.9] 08/30/2020 Yes    Sleep apnea [G47.30] 08/28/2020 Yes       Plan    · Continue antibiotics - would prefer 7 days total IV  · BiPAP as needed - wean O2 as able, vapotherm as tolerated - wean O 2 as able, seems to be getting better but not ready to go home  · + acinetobacter bacteremia  · Continue oral steroids  · Continue ESBRIET  · ELIQUIS held due to recurring hemoptysis  · Monitor platelets  · Consult PT, OT to work with pt        Thank you for this consult.  I will follow with you while the patient is hospitalized.  Please call ([phone]) if you have any questions.    Timo Ambrocio MD

## 2020-09-01 NOTE — PLAN OF CARE
Problem: Physical Therapy Goal  Goal: Physical Therapy Goal  Description: Goals to be met by:D/C    Patient will increase functional independence with mobility by performin. Supine to sit with Modified [place]  2. Sit to stand transfer with Modified [place]  3. Gait  x 50  feet with Supervision using Rolling Walker.     Outcome: Ongoing, Progressing   Pt progressing toward meeting goals

## 2020-09-01 NOTE — PROGRESS NOTES
[facility] Medicine  Progress Note    Patient name: Manuel Casas  MRN: [id number]  Admit Date: 8/26/2020   LOS: 5 days     SUBJECTIVE:     Principal problem: Pneumonia    Interval History:  Patient was seen and examined bedside.  Breathing on 65% FiO2 Vapotherm and he is breathing comfortably and saturating in mid 90s. Otherwise hemodynamically stable.  No acute events overnight as per nursing staff.     Scheduled Meds:   bicalutamide  50 mg Oral Daily    cefTRIAXone (ROCEPHIN) IVPB  2 g Intravenous Q12H    clotrimazole-betamethasone 1-0.05%   Topical (Top) BID    escitalopram oxalate  10 mg Oral QHS    famotidine  20 mg Oral BID    insulin NPH  35 Units Subcutaneous BID    levothyroxine  75 mcg Oral Before breakfast    montelukast  10 mg Oral QHS    pirfenidone  801 mg Oral TID    predniSONE  30 mg Oral Daily    pregabalin  150 mg Oral QHS     Continuous Infusions:  PRN Meds:acetaminophen, acetaminophen, albuterol, bisacodyL, dextrose 50%, dextrose 50%, diphenhydrAMINE, glucagon (human recombinant), glucose, glucose, insulin aspart U-100, magnesium oxide, magnesium oxide, ondansetron, potassium, sodium phosphates, potassium, sodium phosphates, potassium, sodium phosphates, promethazine (PHENERGAN) IVPB, sodium chloride 0.9%    Review of patient's allergies indicates:  No Known Allergies    Review of Systems: As per interval history    OBJECTIVE:     Vital Signs (Most Recent)  Temp: 97.7 °F (36.5 °C) (09/01/20 0800)  Pulse: 66 (09/01/20 0800)  Resp: 18 (09/01/20 0800)  BP: 127/72 (09/01/20 0800)  SpO2: (!) 93 % (09/01/20 0800)    Vital Signs Range (Last 24H):  Temp:  [97.7 °F (36.5 °C)-99.1 °F (37.3 °C)]   Pulse:  []   Resp:  [15-39]   BP: (104-162)/(55-86)   SpO2:  [88 %-96 %]     I & O (Last 24H):    Intake/Output Summary (Last 24 hours) at 9/1/2020 0810  Last data filed at 9/1/2020 0600  Gross per 24 hour   Intake 925 ml   Output 1500 ml   Net -575 ml       Physical  Exam:  General:  Sitting in the chair. Appears as stated age. Calm, obese  Eyes: No conjunctival injection. No scleral icterus.  ENT: Hearing grossly intact. No discharge from ears. No nasal discharge.   Neck: Supple, trachea midline. No JVD  CVS: RRR. No LE edema BL  Lungs:  Remains on Vapotherm with 65% FiO2, bilateral basal crackles noted.  No accessory muscle use.   Abdomen:  Soft, nontender and nondistended.  No organomegaly  Neuro: AOx3. Moves all extremities. Follows commands. 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    Active Hospital Problems    Diagnosis  POA    *Pneumonia [J18.9]  Yes    Respiratory failure with hypoxia [J96.91]  Yes    History of pulmonary fibrosis [Z87.09]  Not Applicable    Gram-negative bacteremia [R78.81]  Yes    Hypothyroidism [E03.9]  Yes    Diabetes mellitus [E11.9]  Yes    Acute on chronic respiratory failure with hypoxia [J96.21]  Yes    Thrombocytopenia [D69.6]  Yes    Atrial flutter [I48.92]  Yes    Presence of cardiac pacemaker [Z95.0]  Yes    Pulmonary fibrosis [J84.10]  Yes     · UIP on ESBRIET since about 2015  · PFT (11/19) - TLC - 74%, DLCO - 80%      Obesity with body mass index 30 or greater [E66.9]  Yes      Resolved Hospital Problems    Diagnosis Date Resolved POA     Hemoptysis [R04.2] 08/30/2020 Yes    Sepsis [A41.9] 08/30/2020 Yes    Sleep apnea [G47.30] 08/28/2020 Yes         Plan:   Overall clinical picture is consistent with sepsis due to Gram-negative bacterial pneumonia in the background of lung fibrosis and chronic hypoxic respiratory failure. Still remains on significant oxygen via Vapotherm(65% FiO2).  Blood cultures are growing Acinetobacter.     Overall very slow recovery due to pre-existing lung fibrosis and poor functional status    Resumed low-dose Eliquis     Continue Rocephin based on susceptibility     Wean oxygen as tolerated    Continue p.o. steroids    CPAP q.h.s.    Accu-Cheks, sliding scale insulin    Bronchodilators as needed    Follow-up on 2D echo-still no read    PT, OT consult    Encourage incentive spirometer, out of bed to chair      VTE Risk Mitigation (From admission, onward)         Ordered     Reason for No Pharmacological VTE Prophylaxis  Once     Question:  Reasons:  Answer:  Risk of Bleeding    08/27/20 0341     IP VTE HIGH RISK PATIENT  Once      08/27/20 0341     Place sequential compression device  Until discontinued      08/27/20 0341                  Department Hospital Medicine  [facility]  Kael Keith MD  Date of service: 09/01/2020

## 2020-09-01 NOTE — PT/OT/SLP EVAL
Occupational Therapy   Evaluation    Name: Manuel Casas  MRN: [id number]  Admitting Diagnosis:  Pneumonia      Recommendations:     Discharge Recommendations: home health OT  Discharge Equipment Recommendations:  bedside commode(Bariatric BSC)  Barriers to discharge:  None    Assessment:     Manuel Casas is a 87 y.o. male with a medical diagnosis of Pneumonia.  He presents with general weakness along with weaning from oxygen. Currently on vasotherm, but normally on 4L O2 at home. Performance deficits affecting function: weakness, impaired endurance, impaired self care skills, impaired functional mobilty, gait instability, impaired balance, impaired cardiopulmonary response to activity.      Rehab Prognosis: Fair; patient would benefit from acute skilled OT services to address these deficits and reach maximum level of function.       Plan:     Patient to be seen 5 x/week to address the above listed problems via self-care/home management, therapeutic activities, therapeutic exercises  · Plan of Care Expires: 10/01/20  · Plan of Care Reviewed with: patient, spouse    Subjective     Chief Complaint: SOB with actvity  Patient/Family Comments/goals: reduce O2 requirement and increased endurance.    Occupational Profile:  Living Environment: lives with spouse in a 1 story home with 8 steps to enter. Has a stair lift for outside steps.   Previous level of function: required some assistance with bathing, but able to dress and feed self with modified independence using a rolling walker. Relies on spouse of household management and transportation.  Roles and Routines: limited homemaker  Equipment Used at Home:  walker, rolling, wheelchair, shower chair, oxygen  Assistance upon Discharge: Spouse    Pain/Comfort:  · Pain Rating 1: 0/10  · Pain Rating Post-Intervention 1: 0/10    Patients cultural, spiritual, Jehovah's witness conflicts given the current situation: no    Objective:     Communicated with: nurse prior to session.   Patient found up in chair with telemetry, peripheral IV upon OT entry to room.    General Precautions: Standard, fall   Orthopedic Precautions:N/A   Braces: N/A     Occupational Performance:    Functional Mobility/Transfers:  · Patient completed Sit <> Stand Transfer with contact guard assistance  with  rolling walker   · Functional Mobility: ambulated 15 feet using rolling walker and 1 standing rest break with contact guard assistance.    Activities of Daily Living:  · Grooming: contact guard assistance to wash face sitting in chair.  · Lower Body Dressing: maximal assistance to doff sock sitting EOB.    Cognitive/Visual Perceptual:  Cognitive/Psychosocial Skills:     -       Oriented to: Person, Place, Time and Situation   -       Follows Commands/attention:Follows multistep  commands  -       Communication: clear/fluent  -       Memory: No Deficits noted  -       Safety awareness/insight to disability: intact   -       Mood/Affect/Coping skills/emotional control: Cooperative and Pleasant  Visual/Perceptual:      -Intact Acuity    Physical Exam:  Balance:    -       Sitting/Standing: Contact Guard  Upper Extremity Range of Motion:     -       Right Upper Extremity: WFL  -       Left Upper Extremity: WFL  Upper Extremity Strength:    -       Right Upper Extremity: WFL  -       Left Upper Extremity: WFL   Strength:    -       Right Upper Extremity: WFL  -       Left Upper Extremity: WFL  Fine Motor Coordination:    -       Intact    AMPAC 6 Click ADL:  AMPAC Total Score: 18    Treatment & Education:  Patient and spouse educated on the role of Occupational Therapy and the importance of getting OOB.  Education:    Patient left up in chair with all lines intact, call button in reach and spouse present    GOALS:   Multidisciplinary Problems     Occupational Therapy Goals        Problem: Occupational Therapy Goal    Goal Priority Disciplines Outcome Interventions   Occupational Therapy Goal     OT, PT/OT      Description: Goals to be met by: discharge     Patient will increase functional independence with ADLs by performing:    UE Dressing with Stand-by Assistance.  LE Dressing with Stand-by Assistance.  Grooming while standing at sink with Stand-by Assistance.  Toileting from toilet with Stand-by Assistance for hygiene and clothing management.   Toilet transfer to toilet with Stand-by Assistance.  Perform 30 minutes of sitting/standing ADL activity with O2 sats above 90%.                     History:     Past Medical History:   Diagnosis Date    Atrial flutter     Cancer     prostate    Congestive heart failure     Diabetes mellitus, type 2     Heart failure     right sided    Hyperlipidemia     Pulmonary fibrosis     Sleep apnea        Past Surgical History:   Procedure Laterality Date    ADENOIDECTOMY      CARDIAC PACEMAKER PLACEMENT      HERNIA REPAIR      left, umbilical    TONSILLECTOMY         Time Tracking:     OT Date of Treatment:    OT Start Time: 1005  OT Stop Time: 1035  OT Total Time (min): 30 min    Billable Minutes:Evaluation 10  Self Care/Home Management 20    Sebas Chow OT  9/1/2020

## 2020-09-01 NOTE — PLAN OF CARE
Problem: Adult Inpatient Plan of Care  Goal: Plan of Care Review  Outcome: Ongoing, Progressing  Goal: Patient-Specific Goal (Individualization)  Outcome: Ongoing, Progressing  Goal: Absence of Hospital-Acquired Illness or Injury  Outcome: Ongoing, Progressing  Goal: Optimal Comfort and Wellbeing  Outcome: Ongoing, Progressing  Goal: Readiness for Transition of Care  Outcome: Ongoing, Progressing  Goal: Rounds/Family Conference  Outcome: Ongoing, Progressing     Problem: Fall Injury Risk  Goal: Absence of Fall and Fall-Related Injury  Outcome: Ongoing, Progressing     Problem: Diabetes Comorbidity  Goal: Blood Glucose Level Within Desired Range  Outcome: Ongoing, Progressing     Problem: Fluid Imbalance (Pneumonia)  Goal: Fluid Balance  Outcome: Ongoing, Progressing     Problem: Infection (Pneumonia)  Goal: Resolution of Infection Signs/Symptoms  Outcome: Ongoing, Progressing     Problem: Respiratory Compromise (Pneumonia)  Goal: Effective Oxygenation and Ventilation  Outcome: Ongoing, Progressing     Problem: Skin Injury Risk Increased  Goal: Skin Health and Integrity  Outcome: Ongoing, Progressing     Problem: Oral Intake Inadequate  Goal: Improved Oral Intake  Outcome: Ongoing, Progressing     Problem: Infection  Goal: Infection Symptom Resolution  Outcome: Ongoing, Progressing

## 2020-09-01 NOTE — PT/OT/SLP PROGRESS
Physical Therapy Treatment    Patient Name:  Manuel Casas   MRN:  [id number]    Recommendations:     Discharge Recommendations:  home health PT   Discharge Equipment Recommendations:     Barriers to discharge: None    Assessment:     Manuel Casas is a 87 y.o. male admitted with a medical diagnosis of Pneumonia.  He presents with the following impairments/functional limitations:  weakness, impaired endurance, impaired self care skills, impaired functional mobilty, gait instability, impaired balance, impaired cardiopulmonary response to activity. Pt found sitting up in chair with wife present on vapotherm 35LPM noting sats of 90%. Pt completed 2 trials of standing marches noting Pt required 1 standing rest break between trials due to desaturation to 87%. Pt ambulated 30 feet with RW and CGA noting Pt required increased time due to frequent standing rest breaks with verbal cuing for PLB 2/2 desaturation to 87% noting quick recovery to 90%. Pt  Without LOB or SOB while ambulating in room. Pt returned to sitting in chair post gait training noting sats of 90-91%.  Continue with PT and POC.     Rehab Prognosis: Good; patient would benefit from acute skilled PT services to address these deficits and reach maximum level of function.    Recent Surgery: * No surgery found *      Plan:     During this hospitalization, patient to be seen daily to address the identified rehab impairments via gait training, therapeutic activities, therapeutic exercises and progress toward the following goals:    · Plan of Care Expires:  09/30/20    Subjective     Chief Complaint: Pt reports feeling well this morning.  Patient/Family Comments/goals: return home   Pain/Comfort:  · Pain Rating 1: 0/10  · Pain Rating Post-Intervention 1: 0/10      Objective:     Communicated with RN prior to session.  Patient found up in chair with telemetry, peripheral IV(wife present) upon PT entry to room.     General Precautions: Standard, fall   Orthopedic  Precautions:    Braces:       Functional Mobility:  · Transfers:     · Sit to Stand:  contact guard assistance with rolling walker and increased time required  · Gait: 30 feet with RW and CGA requiring frequent standing rest breaks and verbal cuing for PLB.      AM-PAC 6 CLICK MOBILITY          Therapeutic Activities and Exercises:   bed mobility; sitting EOB for trunk control and midline orientation; sit<> stands; transfer training; gait training     Patient left up in chair with all lines intact, call button in reach and wife and OT present..    GOALS:   Multidisciplinary Problems     Physical Therapy Goals        Problem: Physical Therapy Goal    Goal Priority Disciplines Outcome Goal Variances Interventions   Physical Therapy Goal     PT, PT/OT Ongoing, Progressing     Description: Goals to be met by:D/C    Patient will increase functional independence with mobility by performin. Supine to sit with Modified [place]  2. Sit to stand transfer with Modified [place]  3. Gait  x 50  feet with Supervision using Rolling Walker.                      Time Tracking:     PT Received On: 20  PT Start Time: 0955     PT Stop Time: 1018  PT Total Time (min): 23 min     Billable Minutes: Gait Training 15 and Therapeutic Activity 8    Treatment Type: Treatment  PT/PTA: PTA     PTA Visit Number: 1     Hillary Hammant, PTA  2020

## 2020-09-02 LAB
ALBUMIN SERPL BCP-MCNC: 3.5 G/DL (ref 3.5–5.2)
ALP SERPL-CCNC: 54 U/L (ref 55–135)
ALT SERPL W/O P-5'-P-CCNC: 23 U/L (ref 10–44)
ANION GAP SERPL CALC-SCNC: 11 MMOL/L (ref 8–16)
ANISOCYTOSIS BLD QL SMEAR: SLIGHT
AST SERPL-CCNC: 18 U/L (ref 10–40)
BACTERIA BLD CULT: NORMAL
BASOPHILS NFR BLD: 0 % (ref 0–1.9)
BILIRUB SERPL-MCNC: 0.4 MG/DL (ref 0.1–1)
BUN SERPL-MCNC: 32 MG/DL (ref 8–23)
CALCIUM SERPL-MCNC: 9.1 MG/DL (ref 8.7–10.5)
CHLORIDE SERPL-SCNC: 105 MMOL/L (ref 95–110)
CO2 SERPL-SCNC: 24 MMOL/L (ref 23–29)
CREAT SERPL-MCNC: 1.1 MG/DL (ref 0.5–1.4)
DIFFERENTIAL METHOD: ABNORMAL
EOSINOPHIL NFR BLD: 0 % (ref 0–8)
ERYTHROCYTE [DISTWIDTH] IN BLOOD BY AUTOMATED COUNT: 14.3 % (ref 11.5–14.5)
EST. GFR  (AFRICAN AMERICAN): >60 ML/MIN/1.73 M^2
EST. GFR  (NON AFRICAN AMERICAN): >60 ML/MIN/1.73 M^2
GLUCOSE SERPL-MCNC: 119 MG/DL (ref 70–110)
GLUCOSE SERPL-MCNC: 126 MG/DL (ref 70–110)
GLUCOSE SERPL-MCNC: 141 MG/DL (ref 70–110)
GLUCOSE SERPL-MCNC: 184 MG/DL (ref 70–110)
GLUCOSE SERPL-MCNC: 207 MG/DL (ref 70–110)
HCT VFR BLD AUTO: 45.9 % (ref 40–54)
HGB BLD-MCNC: 15.3 G/DL (ref 14–18)
IMM GRANULOCYTES # BLD AUTO: ABNORMAL K/UL (ref 0–0.04)
IMM GRANULOCYTES NFR BLD AUTO: ABNORMAL % (ref 0–0.5)
LYMPHOCYTES NFR BLD: 60 % (ref 18–48)
MAGNESIUM SERPL-MCNC: 1.9 MG/DL (ref 1.6–2.6)
MCH RBC QN AUTO: 33.4 PG (ref 27–31)
MCHC RBC AUTO-ENTMCNC: 33.3 G/DL (ref 32–36)
MCV RBC AUTO: 100 FL (ref 82–98)
METAMYELOCYTES NFR BLD MANUAL: 1 %
MONOCYTES NFR BLD: 7 % (ref 4–15)
NEUTROPHILS NFR BLD: 29 % (ref 38–73)
NEUTS BAND NFR BLD MANUAL: 3 %
NRBC BLD-RTO: 0 /100 WBC
PHOSPHATE SERPL-MCNC: 3.6 MG/DL (ref 2.7–4.5)
PLATELET # BLD AUTO: 124 K/UL (ref 150–350)
PMV BLD AUTO: 10.8 FL (ref 9.2–12.9)
POTASSIUM SERPL-SCNC: 4 MMOL/L (ref 3.5–5.1)
PROT SERPL-MCNC: 5.8 G/DL (ref 6–8.4)
RBC # BLD AUTO: 4.58 M/UL (ref 4.6–6.2)
SMUDGE CELLS BLD QL SMEAR: PRESENT
SODIUM SERPL-SCNC: 140 MMOL/L (ref 136–145)
WBC # BLD AUTO: 11.67 K/UL (ref 3.9–12.7)

## 2020-09-02 PROCEDURE — 80053 COMPREHEN METABOLIC PANEL: CPT

## 2020-09-02 PROCEDURE — 63600175 PHARM REV CODE 636 W HCPCS: Performed by: INTERNAL MEDICINE

## 2020-09-02 PROCEDURE — 83735 ASSAY OF MAGNESIUM: CPT

## 2020-09-02 PROCEDURE — 21400001 HC TELEMETRY ROOM

## 2020-09-02 PROCEDURE — 27100171 HC OXYGEN HIGH FLOW UP TO 24 HOURS

## 2020-09-02 PROCEDURE — 94660 CPAP INITIATION&MGMT: CPT

## 2020-09-02 PROCEDURE — 99232 SBSQ HOSP IP/OBS MODERATE 35: CPT | Mod: ,,, | Performed by: INTERNAL MEDICINE

## 2020-09-02 PROCEDURE — 94761 N-INVAS EAR/PLS OXIMETRY MLT: CPT

## 2020-09-02 PROCEDURE — 97116 GAIT TRAINING THERAPY: CPT | Mod: CQ

## 2020-09-02 PROCEDURE — 85027 COMPLETE CBC AUTOMATED: CPT

## 2020-09-02 PROCEDURE — 25000003 PHARM REV CODE 250: Performed by: HOSPITALIST

## 2020-09-02 PROCEDURE — 36415 COLL VENOUS BLD VENIPUNCTURE: CPT

## 2020-09-02 PROCEDURE — 99900035 HC TECH TIME PER 15 MIN (STAT)

## 2020-09-02 PROCEDURE — 97530 THERAPEUTIC ACTIVITIES: CPT | Mod: CQ

## 2020-09-02 PROCEDURE — 63600175 PHARM REV CODE 636 W HCPCS: Performed by: HOSPITALIST

## 2020-09-02 PROCEDURE — 85007 BL SMEAR W/DIFF WBC COUNT: CPT

## 2020-09-02 PROCEDURE — 99232 PR SUBSEQUENT HOSPITAL CARE,LEVL II: ICD-10-PCS | Mod: ,,, | Performed by: INTERNAL MEDICINE

## 2020-09-02 PROCEDURE — 84100 ASSAY OF PHOSPHORUS: CPT

## 2020-09-02 PROCEDURE — 27000221 HC OXYGEN, UP TO 24 HOURS

## 2020-09-02 PROCEDURE — 97530 THERAPEUTIC ACTIVITIES: CPT

## 2020-09-02 RX ORDER — FUROSEMIDE 10 MG/ML
20 INJECTION INTRAMUSCULAR; INTRAVENOUS ONCE
Status: COMPLETED | OUTPATIENT
Start: 2020-09-02 | End: 2020-09-02

## 2020-09-02 RX ADMIN — PREDNISONE 30 MG: 5 TABLET ORAL at 09:09

## 2020-09-02 RX ADMIN — PIRFENIDONE 801 MG: 801 TABLET, FILM COATED ORAL at 09:09

## 2020-09-02 RX ADMIN — HUMAN INSULIN 35 UNITS: 100 INJECTION, SUSPENSION SUBCUTANEOUS at 09:09

## 2020-09-02 RX ADMIN — FUROSEMIDE 20 MG: 10 INJECTION, SOLUTION INTRAMUSCULAR; INTRAVENOUS at 12:09

## 2020-09-02 RX ADMIN — APIXABAN 2.5 MG: 2.5 TABLET, FILM COATED ORAL at 09:09

## 2020-09-02 RX ADMIN — ESCITALOPRAM OXALATE 10 MG: 10 TABLET ORAL at 09:09

## 2020-09-02 RX ADMIN — BICALUTAMIDE 50 MG: 50 TABLET, FILM COATED ORAL at 09:09

## 2020-09-02 RX ADMIN — APIXABAN 2.5 MG: 2.5 TABLET, FILM COATED ORAL at 12:09

## 2020-09-02 RX ADMIN — PREGABALIN 150 MG: 75 CAPSULE ORAL at 09:09

## 2020-09-02 RX ADMIN — PIRFENIDONE 801 MG: 801 TABLET, FILM COATED ORAL at 03:09

## 2020-09-02 RX ADMIN — LEVOTHYROXINE SODIUM 75 MCG: 25 TABLET ORAL at 06:09

## 2020-09-02 RX ADMIN — CEFTRIAXONE 2 G: 2 INJECTION, SOLUTION INTRAVENOUS at 03:09

## 2020-09-02 RX ADMIN — CLOTRIMAZOLE AND BETAMETHASONE DIPROPIONATE: 10; .5 CREAM TOPICAL at 09:09

## 2020-09-02 RX ADMIN — FAMOTIDINE 20 MG: 20 TABLET ORAL at 09:09

## 2020-09-02 NOTE — RESPIRATORY THERAPY
09/01/20 2108   Patient Assessment/Suction   Respiratory Effort Unlabored   All Lung Fields Breath Sounds clear   Rhythm/Pattern, Respiratory pattern regular   PRE-TX-O2   O2 Device (Oxygen Therapy) Vapotherm   Humidification temp set 33   Humidification temp actual 33   Flow (L/min) 35   Oxygen Concentration (%) 65   SpO2 (!) 91 %   Pulse Oximetry Type Continuous   Pulse 70   Resp 17   Ready to Wean/Extubation Screen   FIO2<=50 (chart decimal) (!) 0.65

## 2020-09-02 NOTE — PROGRESS NOTES
[facility] Medicine  Progress Note    Patient name: Manuel Casas  MRN: [id number]  Admit Date: 8/26/2020   LOS: 6 days     SUBJECTIVE:     Principal problem: Pneumonia    Interval History:  Patient was seen and examined bedside.  Breathing on 65% FiO2 Vapotherm and he is breathing comfortably and saturating in mid 90s. Otherwise hemodynamically stable.  No acute events overnight as per nursing staff.  Somehow no improvement in oxygen requirement in last 24 hours.    Scheduled Meds:   bicalutamide  50 mg Oral Daily    cefTRIAXone (ROCEPHIN) IVPB  2 g Intravenous Q12H    clotrimazole-betamethasone 1-0.05%   Topical (Top) BID    escitalopram oxalate  10 mg Oral QHS    famotidine  20 mg Oral BID    insulin NPH  35 Units Subcutaneous BID    levothyroxine  75 mcg Oral Before breakfast    montelukast  10 mg Oral QHS    pirfenidone  801 mg Oral TID    predniSONE  30 mg Oral Daily    pregabalin  150 mg Oral QHS     Continuous Infusions:  PRN Meds:acetaminophen, acetaminophen, albuterol, bisacodyL, dextrose 50%, dextrose 50%, diphenhydrAMINE, glucagon (human recombinant), glucose, glucose, insulin aspart U-100, magnesium oxide, magnesium oxide, ondansetron, potassium, sodium phosphates, potassium, sodium phosphates, potassium, sodium phosphates, promethazine (PHENERGAN) IVPB, sodium chloride 0.9%    Review of patient's allergies indicates:  No Known Allergies    Review of Systems: As per interval history    OBJECTIVE:     Vital Signs (Most Recent)  Temp: 98.1 °F (36.7 °C) (09/02/20 0725)  Pulse: 70 (09/02/20 0725)  Resp: (!) 23 (09/02/20 0725)  BP: 119/66 (09/02/20 0725)  SpO2: (!) 93 % (09/02/20 0725)    Vital Signs Range (Last 24H):  Temp:  [97.9 °F (36.6 °C)-98.3 °F (36.8 °C)]   Pulse:  [69-71]   Resp:  [17-23]   BP: (103-128)/(56-68)   SpO2:  [91 %-94 %]     I & O (Last 24H):    Intake/Output Summary (Last 24 hours) at 9/2/2020 0959  Last data filed at 9/2/2020 0900  Gross per 24 hour    Intake 360 ml   Output 1600 ml   Net -1240 ml       Physical Exam:  General:  Sitting in the chair. Appears as stated age. Calm, obese  Eyes: No conjunctival injection. No scleral icterus.  ENT: Hearing grossly intact. No discharge from ears. No nasal discharge.   Neck: Supple, trachea midline. No JVD  CVS: RRR. No LE edema BL  Lungs:  Remains on Vapotherm with 65% FiO2, bilateral basal crackles noted.  No accessory muscle use.   Abdomen:  Soft, nontender and nondistended.  No organomegaly  Neuro: AOx3. Moves all extremities. Follows commands. 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    Active Hospital Problems    Diagnosis  POA    *Pneumonia [J18.9]  Yes    Respiratory failure with hypoxia [J96.91]  Yes    History of pulmonary fibrosis [Z87.09]  Not Applicable    Gram-negative bacteremia [R78.81]  Yes    Hypothyroidism [E03.9]  Yes    Diabetes mellitus [E11.9]  Yes    Acute on chronic respiratory failure with hypoxia [J96.21]  Yes    Thrombocytopenia [D69.6]  Yes    Atrial flutter [I48.92]  Yes    Presence of cardiac pacemaker [Z95.0]  Yes    Pulmonary fibrosis [J84.10]  Yes     · UIP on ESBRIET since about 2015  · PFT (11/19) - TLC - 74%,  DLCO - 80%      Obesity with body mass index 30 or greater [E66.9]  Yes      Resolved Hospital Problems    Diagnosis Date Resolved POA    Hemoptysis [R04.2] 08/30/2020 Yes    Sepsis [A41.9] 08/30/2020 Yes    Sleep apnea [G47.30] 08/28/2020 Yes         Plan:   Overall clinical picture is consistent with sepsis due to Gram-negative bacterial pneumonia in the background of lung fibrosis and chronic hypoxic respiratory failure. Still remains on significant oxygen via Vapotherm(65% FiO2).  Blood cultures are growing Acinetobacter.     Overall extremely slow recovery due to pre-existing lung fibrosis and poor functional status    Resumed low-dose Eliquis     Continue Rocephin based on susceptibility     Wean oxygen as tolerated    Continue p.o. steroids    CPAP q.h.s.    Accu-Cheks, sliding scale insulin    Bronchodilators as needed    Follow-up on 2D echo-still no read    PT, OT consult    Encourage incentive spirometer, out of bed to chair      VTE Risk Mitigation (From admission, onward)         Ordered     Reason for No Pharmacological VTE Prophylaxis  Once     Question:  Reasons:  Answer:  Risk of Bleeding    08/27/20 0341     IP VTE HIGH RISK PATIENT  Once      08/27/20 0341     Place sequential compression device  Until discontinued      08/27/20 0341                  Department Hospital Medicine  [facility]  Kael Keith MD  Date of service: 09/02/2020

## 2020-09-02 NOTE — PLAN OF CARE
Problem: Adult Inpatient Plan of Care  Goal: Plan of Care Review  Outcome: Ongoing, Progressing  Goal: Patient-Specific Goal (Individualization)  Outcome: Ongoing, Progressing  Goal: Absence of Hospital-Acquired Illness or Injury  Outcome: Ongoing, Progressing  Goal: Optimal Comfort and Wellbeing  Outcome: Ongoing, Progressing  Goal: Readiness for Transition of Care  Outcome: Ongoing, Progressing  Goal: Rounds/Family Conference  Outcome: Ongoing, Progressing     Problem: Fall Injury Risk  Goal: Absence of Fall and Fall-Related Injury  Outcome: Ongoing, Progressing     Problem: Diabetes Comorbidity  Goal: Blood Glucose Level Within Desired Range  Outcome: Ongoing, Progressing     Problem: Respiratory Compromise (Pneumonia)  Goal: Effective Oxygenation and Ventilation  Outcome: Ongoing, Progressing     Problem: Skin Injury Risk Increased  Goal: Skin Health and Integrity  Outcome: Ongoing, Progressing

## 2020-09-02 NOTE — PLAN OF CARE
Problem: Occupational Therapy Goal  Goal: Occupational Therapy Goal  Description: Goals to be met by: discharge     Patient will increase functional independence with ADLs by performing:    UE Dressing with Stand-by Assistance.  LE Dressing with Stand-by Assistance.  Grooming while standing at sink with Stand-by Assistance.  Toileting from toilet with Stand-by Assistance for hygiene and clothing management.   Toilet transfer to toilet with Stand-by Assistance.  Perform 30 minutes of sitting/standing ADL activity with O2 sats above 90%.    Outcome: Ongoing, Progressing

## 2020-09-02 NOTE — PT/OT/SLP PROGRESS
Physical Therapy Treatment    Patient Name:  Manuel Casas   MRN:  [id number]    Recommendations:     Discharge Recommendations:  home health PT   Discharge Equipment Recommendations: bedside commode(bariatric)   Barriers to discharge: None    Assessment:     Manuel Casas is a 87 y.o. male admitted with a medical diagnosis of Pneumonia.  He presents with the following impairments/functional limitations:  weakness, impaired endurance, impaired self care skills, impaired functional mobilty, gait instability, impaired balance, impaired cardiopulmonary response to activity. Patient presents resting in bed with HOB elevated; agreeable to and eager to participate in PT session. Patient progressing with gait and mobility, increasing gait distance. Pt requires frequent rest breaks during gait training 2/2 frequent desaturation. Verbal cueing required for pursed lip breathing. Pt's O2 sats recover quickly with PLB technique. Patient reports that he can tell when his O2 is low and needs to stop and take rest break. Continue with PT and POC.    Rehab Prognosis: Good; patient would benefit from acute skilled PT services to address these deficits and reach maximum level of function.    Recent Surgery: * No surgery found *      Plan:     During this hospitalization, patient to be seen daily to address the identified rehab impairments via gait training, therapeutic activities, therapeutic exercises and progress toward the following goals:    · Plan of Care Expires:  09/30/20    Subjective     Chief Complaint: SOB with ambulation  Patient/Family Comments/goals:   Pain/Comfort:  · Pain Rating 1: 0/10      Objective:     Communicated with RN prior to session.  Patient found HOB elevated with telemetry, oxygen, pulse ox (continuous)(vapotherm) upon PT entry to room.     General Precautions: Standard, fall   Orthopedic Precautions:    Braces:       Functional Mobility:  · Bed Mobility:     · Supine to Sit: stand by  assistance  · Transfers:     · Sit to Stand:  stand by assistance with rolling walker  · Bed to Chair: contact guard assistance with  rolling walker  using  Step Transfer  · Gait: 45ft with RW and CGA; frequent rest breaks 2/2 desaturation      AM-PAC 6 CLICK MOBILITY          Therapeutic Activities and Exercises:   bed mobility; sitting EOB for trunk control and midline orientation; sit <> stands; transfer training; pursed lip breathing education    Patient left up in chair with all lines intact, call button in reach and pt's wife present..    GOALS:   Multidisciplinary Problems     Physical Therapy Goals        Problem: Physical Therapy Goal    Goal Priority Disciplines Outcome Goal Variances Interventions   Physical Therapy Goal     PT, PT/OT Ongoing, Progressing     Description: Goals to be met by:D/C    Patient will increase functional independence with mobility by performin. Supine to sit with Modified [place]  2. Sit to stand transfer with Modified [place]  3. Gait  x 50  feet with Supervision using Rolling Walker.                      Time Tracking:     PT Received On: 20  PT Start Time: 1011     PT Stop Time: 1034  PT Total Time (min): 23 min     Billable Minutes: Gait Training 13 and Therapeutic Activity 10    Treatment Type: Treatment  PT/PTA: PTA     PTA Visit Number: 2     Bonny Crow, TAWNY  2020

## 2020-09-02 NOTE — PT/OT/SLP PROGRESS
Occupational Therapy   Treatment    Name: Manuel Casas  MRN: [id number]  Admitting Diagnosis:  Pneumonia       Recommendations:     Discharge Recommendations: home health OT  Discharge Equipment Recommendations:  bedside commode(bariatric)  Barriers to discharge:  None    Assessment:     Manuel Casas is a 87 y.o. male with a medical diagnosis of Pneumonia.   Performance deficits affecting function are impaired endurance, impaired self care skills, impaired functional mobilty, gait instability, impaired balance, impaired cardiopulmonary response to activity.     Pt presented seated up in bedside chair; he was pleasant/agreeable to therex while seated in chair.     Rehab Prognosis:  Good; patient would benefit from acute skilled OT services to address these deficits and reach maximum level of function.       Plan:     Patient to be seen 5 x/week to address the above listed problems via self-care/home management, therapeutic activities, therapeutic exercises  · Plan of Care Expires: 10/01/20  · Plan of Care Reviewed with: patient    Subjective     Pain/Comfort:  · Pain Rating 1: 0/10  · Pain Rating Post-Intervention 1: 0/10    Objective:     Communicated with: RN prior to session.  Patient found up in chair with peripheral IV, pulse ox (continuous), telemetry(vapotherm) upon OT entry to room.    General Precautions: Standard, fall   Orthopedic Precautions:N/A     Occupational Performance:     Activities of Daily Living:  · Toileting: contact guard assistance to  front of chair and urinate; pt required CGA due to instability when standing unsupported; pt has good awareness of his deficits    Treatment & Education:  Reviewed BUE/BLE therex to be completed throughout the day while pt is seated up in bedside chair (hip flexion, knee extension, shoulder flexion, shoulder abduction, tricep press (partial sit to stand); pt demonstrated understanding of each exercise x 5 repetitions with cues from OT for proper  technique; reviewed PLB with pt and he d/u but could use reinforcement     Discussed DME needs with pt; he is requesting a bariatric bedside commode for home use     Patient left up in chair with all lines intact and call button in reachEducation:      GOALS:   Multidisciplinary Problems     Occupational Therapy Goals        Problem: Occupational Therapy Goal    Goal Priority Disciplines Outcome Interventions   Occupational Therapy Goal     OT, PT/OT Ongoing, Progressing    Description: Goals to be met by: discharge     Patient will increase functional independence with ADLs by performing:    UE Dressing with Stand-by Assistance.  LE Dressing with Stand-by Assistance.  Grooming while standing at sink with Stand-by Assistance.  Toileting from toilet with Stand-by Assistance for hygiene and clothing management.   Toilet transfer to toilet with Stand-by Assistance.  Perform 30 minutes of sitting/standing ADL activity with O2 sats above 90%.                     Time Tracking:     OT Date of Treatment: 09/02/20  OT Start Time: 1409  OT Stop Time: 1426  OT Total Time (min): 17 min    Billable Minutes:Therapeutic Exercise 17    Carlos Woodall, MYRA  9/2/2020

## 2020-09-02 NOTE — PLAN OF CARE
This note also relates to the following rows which could not be included:  SpO2 - Cannot attach notes to unvalidated device data  Pulse - Cannot attach notes to unvalidated device data  Resp - Cannot attach notes to unvalidated device data       09/02/20 1059   Patient Assessment/Suction   Level of Consciousness (AVPU) alert   All Lung Fields Breath Sounds clear;diminished   PRE-TX-O2   O2 Device (Oxygen Therapy) Vapotherm   $ Is the patient on Low Flow Oxygen? Yes  (Bipap @ [facility])   $ Is the patient on High Flow Oxygen? Yes   Flow (L/min) 35   Oxygen Concentration (%) 65   Pulse Oximetry Type Continuous   $ Pulse Oximetry - Multiple Charge Pulse Oximetry - Multiple   Aerosol Therapy   $ Aerosol Therapy Charges PRN treatment not required   Ready to Wean/Extubation Screen   FIO2<=50 (chart decimal) (!) 0.65   Preset CPAP/BiPAP Settings   $ CPAP/BiPAP Daily Charge BiPAP/CPAP Daily   Respiratory Evaluation   $ Care Plan Tech Time 15 min

## 2020-09-02 NOTE — PLAN OF CARE
Problem: Physical Therapy Goal  Goal: Physical Therapy Goal  Description: Goals to be met by:D/C    Patient will increase functional independence with mobility by performin. Supine to sit with Modified [place]  2. Sit to stand transfer with Modified [place]  3. Gait  x 50  feet with Supervision using Rolling Walker.     Outcome: Ongoing, Progressing   Pt continues to progress towards goals.

## 2020-09-03 ENCOUNTER — TELEPHONE (OUTPATIENT)
Dept: PULMONOLOGY | Facility: CLINIC | Age: 85
End: 2020-09-03

## 2020-09-03 LAB
ALBUMIN SERPL BCP-MCNC: 3.6 G/DL (ref 3.5–5.2)
ALP SERPL-CCNC: 54 U/L (ref 55–135)
ALT SERPL W/O P-5'-P-CCNC: 24 U/L (ref 10–44)
ANION GAP SERPL CALC-SCNC: 13 MMOL/L (ref 8–16)
AST SERPL-CCNC: 19 U/L (ref 10–40)
BACTERIA BLD CULT: NORMAL
BASOPHILS NFR BLD: 0 % (ref 0–1.9)
BILIRUB SERPL-MCNC: 0.5 MG/DL (ref 0.1–1)
BUN SERPL-MCNC: 29 MG/DL (ref 8–23)
CALCIUM SERPL-MCNC: 9.1 MG/DL (ref 8.7–10.5)
CHLORIDE SERPL-SCNC: 105 MMOL/L (ref 95–110)
CO2 SERPL-SCNC: 22 MMOL/L (ref 23–29)
CREAT SERPL-MCNC: 1.1 MG/DL (ref 0.5–1.4)
DIFFERENTIAL METHOD: ABNORMAL
EOSINOPHIL NFR BLD: 0 % (ref 0–8)
ERYTHROCYTE [DISTWIDTH] IN BLOOD BY AUTOMATED COUNT: 14 % (ref 11.5–14.5)
EST. GFR  (AFRICAN AMERICAN): >60 ML/MIN/1.73 M^2
EST. GFR  (NON AFRICAN AMERICAN): >60 ML/MIN/1.73 M^2
GLUCOSE SERPL-MCNC: 112 MG/DL (ref 70–110)
GLUCOSE SERPL-MCNC: 132 MG/DL (ref 70–110)
GLUCOSE SERPL-MCNC: 171 MG/DL (ref 70–110)
GLUCOSE SERPL-MCNC: 219 MG/DL (ref 70–110)
GLUCOSE SERPL-MCNC: 97 MG/DL (ref 70–110)
HCT VFR BLD AUTO: 47 % (ref 40–54)
HGB BLD-MCNC: 15.9 G/DL (ref 14–18)
IMM GRANULOCYTES # BLD AUTO: ABNORMAL K/UL (ref 0–0.04)
IMM GRANULOCYTES NFR BLD AUTO: ABNORMAL % (ref 0–0.5)
LYMPHOCYTES NFR BLD: 53 % (ref 18–48)
MAGNESIUM SERPL-MCNC: 1.9 MG/DL (ref 1.6–2.6)
MCH RBC QN AUTO: 32.5 PG (ref 27–31)
MCHC RBC AUTO-ENTMCNC: 33.8 G/DL (ref 32–36)
MCV RBC AUTO: 96 FL (ref 82–98)
METAMYELOCYTES NFR BLD MANUAL: 5 %
MONOCYTES NFR BLD: 6 % (ref 4–15)
MYELOCYTES NFR BLD MANUAL: 1 %
NEUTROPHILS NFR BLD: 33 % (ref 38–73)
NEUTS BAND NFR BLD MANUAL: 2 %
NRBC BLD-RTO: 0 /100 WBC
O+P STL TRI STN: NORMAL
PHOSPHATE SERPL-MCNC: 3.3 MG/DL (ref 2.7–4.5)
PLATELET # BLD AUTO: 130 K/UL (ref 150–350)
PLATELET BLD QL SMEAR: ABNORMAL
PMV BLD AUTO: 10.7 FL (ref 9.2–12.9)
POTASSIUM SERPL-SCNC: 3.6 MMOL/L (ref 3.5–5.1)
PROT SERPL-MCNC: 6.2 G/DL (ref 6–8.4)
RBC # BLD AUTO: 4.89 M/UL (ref 4.6–6.2)
SMUDGE CELLS BLD QL SMEAR: PRESENT
SODIUM SERPL-SCNC: 140 MMOL/L (ref 136–145)
WBC # BLD AUTO: 12.19 K/UL (ref 3.9–12.7)

## 2020-09-03 PROCEDURE — 97116 GAIT TRAINING THERAPY: CPT | Mod: CQ

## 2020-09-03 PROCEDURE — 84100 ASSAY OF PHOSPHORUS: CPT

## 2020-09-03 PROCEDURE — 85027 COMPLETE CBC AUTOMATED: CPT

## 2020-09-03 PROCEDURE — 27000221 HC OXYGEN, UP TO 24 HOURS

## 2020-09-03 PROCEDURE — 99900035 HC TECH TIME PER 15 MIN (STAT)

## 2020-09-03 PROCEDURE — 25000003 PHARM REV CODE 250: Performed by: HOSPITALIST

## 2020-09-03 PROCEDURE — 83735 ASSAY OF MAGNESIUM: CPT

## 2020-09-03 PROCEDURE — 80053 COMPREHEN METABOLIC PANEL: CPT

## 2020-09-03 PROCEDURE — 94660 CPAP INITIATION&MGMT: CPT

## 2020-09-03 PROCEDURE — 94761 N-INVAS EAR/PLS OXIMETRY MLT: CPT

## 2020-09-03 PROCEDURE — 63600175 PHARM REV CODE 636 W HCPCS: Performed by: INTERNAL MEDICINE

## 2020-09-03 PROCEDURE — 85007 BL SMEAR W/DIFF WBC COUNT: CPT

## 2020-09-03 PROCEDURE — 63600175 PHARM REV CODE 636 W HCPCS: Performed by: HOSPITALIST

## 2020-09-03 PROCEDURE — 99232 SBSQ HOSP IP/OBS MODERATE 35: CPT | Mod: ,,, | Performed by: INTERNAL MEDICINE

## 2020-09-03 PROCEDURE — 36415 COLL VENOUS BLD VENIPUNCTURE: CPT

## 2020-09-03 PROCEDURE — 97530 THERAPEUTIC ACTIVITIES: CPT

## 2020-09-03 PROCEDURE — 21400001 HC TELEMETRY ROOM

## 2020-09-03 PROCEDURE — 99232 PR SUBSEQUENT HOSPITAL CARE,LEVL II: ICD-10-PCS | Mod: ,,, | Performed by: INTERNAL MEDICINE

## 2020-09-03 RX ORDER — POTASSIUM CHLORIDE 1.5 G/1.58G
40 POWDER, FOR SOLUTION ORAL ONCE
Status: DISCONTINUED | OUTPATIENT
Start: 2020-09-03 | End: 2020-09-03

## 2020-09-03 RX ORDER — POTASSIUM CHLORIDE 20 MEQ/1
40 TABLET, EXTENDED RELEASE ORAL ONCE
Status: COMPLETED | OUTPATIENT
Start: 2020-09-03 | End: 2020-09-03

## 2020-09-03 RX ADMIN — CEFTRIAXONE 2 G: 2 INJECTION, SOLUTION INTRAVENOUS at 02:09

## 2020-09-03 RX ADMIN — APIXABAN 2.5 MG: 2.5 TABLET, FILM COATED ORAL at 09:09

## 2020-09-03 RX ADMIN — PIRFENIDONE 801 MG: 801 TABLET, FILM COATED ORAL at 09:09

## 2020-09-03 RX ADMIN — LEVOTHYROXINE SODIUM 75 MCG: 25 TABLET ORAL at 05:09

## 2020-09-03 RX ADMIN — PREGABALIN 150 MG: 75 CAPSULE ORAL at 08:09

## 2020-09-03 RX ADMIN — PREDNISONE 30 MG: 5 TABLET ORAL at 09:09

## 2020-09-03 RX ADMIN — HUMAN INSULIN 35 UNITS: 100 INJECTION, SUSPENSION SUBCUTANEOUS at 08:09

## 2020-09-03 RX ADMIN — POTASSIUM CHLORIDE 40 MEQ: 20 TABLET, EXTENDED RELEASE ORAL at 12:09

## 2020-09-03 RX ADMIN — APIXABAN 2.5 MG: 2.5 TABLET, FILM COATED ORAL at 08:09

## 2020-09-03 RX ADMIN — PIRFENIDONE 801 MG: 801 TABLET, FILM COATED ORAL at 02:09

## 2020-09-03 RX ADMIN — FAMOTIDINE 20 MG: 20 TABLET ORAL at 08:09

## 2020-09-03 RX ADMIN — HUMAN INSULIN 35 UNITS: 100 INJECTION, SUSPENSION SUBCUTANEOUS at 09:09

## 2020-09-03 RX ADMIN — BICALUTAMIDE 50 MG: 50 TABLET, FILM COATED ORAL at 09:09

## 2020-09-03 RX ADMIN — FAMOTIDINE 20 MG: 20 TABLET ORAL at 09:09

## 2020-09-03 RX ADMIN — PIRFENIDONE 801 MG: 801 TABLET, FILM COATED ORAL at 08:09

## 2020-09-03 RX ADMIN — ESCITALOPRAM OXALATE 10 MG: 10 TABLET ORAL at 08:09

## 2020-09-03 NOTE — PLAN OF CARE
Important Message from Medicare was sign, explained and given to patient/caregiver on 09/03/2020 at 11:31am

## 2020-09-03 NOTE — PT/OT/SLP PROGRESS
Occupational Therapy   Treatment    Name: Manuel Casas  MRN: [id number]  Admitting Diagnosis:  Pneumonia       Recommendations:     Discharge Recommendations: home health OT  Discharge Equipment Recommendations:  bedside commode(bariatric)  Barriers to discharge:  None    Assessment:     Manuel Casas is a 87 y.o. male with a medical diagnosis of Pneumonia. Performance deficits affecting function are impaired endurance, impaired self care skills, impaired functional mobilty, gait instability, impaired balance, impaired cardiopulmonary response to activity. Pt seated in bedside chair working on laptop, agreeable to tx.     Rehab Prognosis:  Good; patient would benefit from acute skilled OT services to address these deficits and reach maximum level of function.       Plan:     Patient to be seen 5 x/week to address the above listed problems via self-care/home management, therapeutic activities, therapeutic exercises  · Plan of Care Expires: 10/01/20  · Plan of Care Reviewed with: patient    Subjective     Pain/Comfort:  · Pain Rating 1: 0/10    Objective:     Communicated with: nurse prior to session.  Patient found up in chair with peripheral IV, pulse ox (continuous), telemetry, oxygen upon OT entry to room.    General Precautions: Standard, fall   Orthopedic Precautions:N/A   Braces: N/A     Treatment & Education:  Pt provided with education (verbal instructions and handout with pictures) for HEP and theraband. Pt verbalized understanding but did not demonstrate since he wanted to get back to working on his laptop.     Patient left up in chair with all lines intact and call button in reachEducation:      GOALS:   Multidisciplinary Problems     Occupational Therapy Goals        Problem: Occupational Therapy Goal    Goal Priority Disciplines Outcome Interventions   Occupational Therapy Goal     OT, PT/OT Ongoing, Progressing    Description: Goals to be met by: discharge     Patient will increase functional  independence with ADLs by performing:    UE Dressing with Stand-by Assistance.  LE Dressing with Stand-by Assistance.  Grooming while standing at sink with Stand-by Assistance.  Toileting from toilet with Stand-by Assistance for hygiene and clothing management.   Toilet transfer to toilet with Stand-by Assistance.  Perform 30 minutes of sitting/standing ADL activity with O2 sats above 90%.                     Time Tracking:     OT Date of Treatment: 09/03/20  OT Start Time: 1333  OT Stop Time: 1344  OT Total Time (min): 11 min    Billable Minutes:Therapeutic Activity 11    Triny Claire OT  9/3/2020

## 2020-09-03 NOTE — PLAN OF CARE
Problem: Physical Therapy Goal  Goal: Physical Therapy Goal  Description: Goals to be met by:D/C    Patient will increase functional independence with mobility by performin. Supine to sit with Modified [place]  2. Sit to stand transfer with Modified [place]  3. Gait  x 50  feet with Supervision using Rolling Walker.     Outcome: Ongoing, Progressing   Pt continues to progress towards goals.

## 2020-09-03 NOTE — RESPIRATORY THERAPY
09/02/20 2005   Patient Assessment/Suction   Respiratory Effort Unlabored   IFEANYI Breath Sounds crackles   LLL Breath Sounds crackles   Rhythm/Pattern, Respiratory pattern regular   PRE-TX-O2   O2 Device (Oxygen Therapy) Vapotherm   Humidification temp set 33   Humidification temp actual 33   Flow (L/min) 35   Oxygen Concentration (%) 50  (decreased to 45%)   SpO2 (!) 92 %   Pulse Oximetry Type Continuous   Pulse 70   Resp 19   BP (!) 93/52   Aerosol Therapy   $ Aerosol Therapy Charges PRN treatment not required   Ready to Wean/Extubation Screen   FIO2<=50 (chart decimal) 0.5   Respiratory Evaluation   $ Care Plan Tech Time 15 min   Evaluation For Re-Eval 5+ day   Admitting Diagnosis sepsis

## 2020-09-03 NOTE — PT/OT/SLP PROGRESS
Physical Therapy Treatment    Patient Name:  Manuel Casas   MRN:  [id number]    Recommendations:     Discharge Recommendations:  home health PT   Discharge Equipment Recommendations: bedside commode(bariatric)   Barriers to discharge: None    Assessment:     Manuel Casas is a 87 y.o. male admitted with a medical diagnosis of Pneumonia.  He presents with the following impairments/functional limitations:  impaired endurance, impaired self care skills, impaired functional mobilty, gait instability, impaired balance, impaired cardiopulmonary response to activity. Patient presents sitting up in chair; on 5L O2; agreeable to PT treatment. Patient progressing with gait and mobility today requiring less standing rest breaks as patients O2 sats did not drop as frequently. Patient did report onset of wooziness initially upon standing however wooziness improved with prolonged standing. No further reports of wooziness as patient ambulated. Pt's O2 sats did drop a couple of times into mid 80s however patient able to recover quickly with standing rest break and pursed lip breathing technique. Continue with PT and POC.    Rehab Prognosis: Good; patient would benefit from acute skilled PT services to address these deficits and reach maximum level of function.    Recent Surgery: * No surgery found *      Plan:     During this hospitalization, patient to be seen daily to address the identified rehab impairments via gait training, therapeutic activities, therapeutic exercises and progress toward the following goals:    · Plan of Care Expires:  09/30/20    Subjective     Chief Complaint: No complaints  Patient/Family Comments/goals:   Pain/Comfort:  · Pain Rating 1: 0/10      Objective:     Communicated with RN prior to session.  Patient found up in chair with peripheral IV, pulse ox (continuous), telemetry, oxygen upon PT entry to room.     General Precautions: Standard, fall   Orthopedic Precautions:    Braces:       Functional  Mobility:  · Transfers:     · Sit to Stand:  contact guard assistance with rolling walker  · Gait: 90ft with RW and CGA      AM-PAC 6 CLICK MOBILITY          Therapeutic Activities and Exercises:   sit <> stands; transfer training; pursed lip breathing education    Patient left up in chair with all lines intact, call button in reach and pt's wife present..    GOALS:   Multidisciplinary Problems     Physical Therapy Goals        Problem: Physical Therapy Goal    Goal Priority Disciplines Outcome Goal Variances Interventions   Physical Therapy Goal     PT, PT/OT Ongoing, Progressing     Description: Goals to be met by:D/C    Patient will increase functional independence with mobility by performin. Supine to sit with Modified [place]  2. Sit to stand transfer with Modified [place]  3. Gait  x 50  feet with Supervision using Rolling Walker.                      Time Tracking:     PT Received On: 20  PT Start Time: 1034     PT Stop Time: 1048  PT Total Time (min): 14 min     Billable Minutes: Gait Training 14    Treatment Type: Treatment  PT/PTA: PTA     PTA Visit Number: 3     Bonny Crow, PTA  2020

## 2020-09-03 NOTE — PLAN OF CARE
09/03/20 0730   Patient Assessment/Suction   Level of Consciousness (AVPU) alert   Respiratory Effort Normal;Unlabored   Expansion/Accessory Muscles/Retractions no use of accessory muscles;no retractions;expansion symmetric   All Lung Fields Breath Sounds clear   Rhythm/Pattern, Respiratory unlabored;pattern regular;depth regular;chest wiggle adequate;no shortness of breath reported   Cough Frequency infrequent   Cough Type good   PRE-TX-O2   O2 Device (Oxygen Therapy) BiPAP   $ Is the patient on Low Flow Oxygen? Yes   Oxygen Concentration (%) 35   SpO2 (!) 92 %   Pulse Oximetry Type Continuous   $ Pulse Oximetry - Multiple Charge Pulse Oximetry - Multiple   Pulse 70   Resp 18   Inhaler   $ Inhaler Charges PRN treatment not required   Preset CPAP/BiPAP Settings   Mode Of Delivery BiPAP   $ CPAP/BiPAP Daily Charge BiPAP/CPAP Daily   $ Initial CPAP/BiPAP Setup? No   $ Is patient using? Yes   Sized Appropriately? Yes   Equipment Type V60   Humidifier not applicable   Ipap 20   EPAP (cm H2O) 10   Pressure Support (cm H2O) 10   Set Rate (Breaths/Min) 18   Respiratory Evaluation   $ Care Plan Tech Time 15 min

## 2020-09-03 NOTE — PROGRESS NOTES
"Pulmonary/Critical Care Progress Note      Patient name: Manuel Casas  MRN: [id number]  Date: 09/03/2020    Admit Date: 8/26/2020  Consult Requested By: Kael Keith MD    Reason for Consult: REspiratory failure, pneumonia, pulm fibrosis    HPI:    8/27/2020 - 88 yo male who I met 2 days ago has h/o pulmonary fibrosis (fairly mild but progressive, on ESBRIET, home O2) had recent respiratory illness treated at Urgent Care and when I saw him he was stablle but with some increased dyspnea and I placed him on a short prednisone taper.  Yesterday during the day he felt well, SOB was better and he was active.  That evening he had an episode of chills and rigors and coughed up some bloody sputum.  He then began to develop fever and came to ER.  Covid test was negative and xrays show a RLL infiltrate c/w CAP.  He has needed BiPAP with 100% O2 and is now admitted for further treatment.  He states that he would prefer to not be intubated unless "absolutely necessary".  ROS as below.  He has not had any corona virus exposures and has been practicing social distancing.    8/31/2020 - Events of the weekend noted, no new issues reported and has been transferred to floor.  Doing better but still with high FiO2 requirements with both vapotherm and BiPAP.  He states that he feels better overall.  CXR shows some clearing at right lung base.    9/1/2020 - Stable overnight, pt sleeping with BiPAP when I saw him (I didn't waken pt).  No new issues reported.  He is still requiring significant O2 (70% vapotherm) and cannot be DC home yet.  Cumulative I/O are negative about 3.3 liters    9/2/2020 - Stable overnight and no new complaints, O2 decreased to 65%.  CXR today looks better.  He is working with therapy and has been out of bed.    9/3/2020 - Stable overnight and sleeping with BiPAP on when I saw him (on 35% O2).  No new issues reported.  I did not waken pt and case was discussed with Dr Keith.    Review of Systems    Review of " Systems   Constitutional: Positive for chills, fever and malaise/fatigue. Negative for diaphoresis and weight loss.   HENT: Negative for congestion, nosebleeds and sinus pain.    Eyes: Negative for pain.   Respiratory: Positive for cough, hemoptysis, sputum production and shortness of breath. Negative for wheezing and stridor.    Cardiovascular: Positive for leg swelling (chronic and not worse). Negative for chest pain, palpitations, orthopnea, claudication and PND.   Gastrointestinal: Positive for diarrhea. Negative for abdominal pain, blood in stool, constipation, heartburn, nausea and vomiting.   Genitourinary: Negative for dysuria, frequency, hematuria and urgency.   Musculoskeletal: Negative for back pain, falls, joint pain, myalgias and neck pain.   Skin: Negative for itching and rash.   Neurological: Positive for weakness (some generalized). Negative for dizziness, tingling, tremors, sensory change, speech change, focal weakness, seizures, loss of consciousness and headaches.   Psychiatric/Behavioral: Negative for depression, substance abuse and suicidal ideas. The patient is not nervous/anxious.        Past Medical History    Past Medical History:   Diagnosis Date    Atrial flutter     Cancer     prostate    Congestive heart failure     Diabetes mellitus, type 2     Heart failure     right sided    Hyperlipidemia     Pulmonary fibrosis     Sleep apnea        Past Surgical History    Past Surgical History:   Procedure Laterality Date    ADENOIDECTOMY      CARDIAC PACEMAKER PLACEMENT      HERNIA REPAIR      left, umbilical    TONSILLECTOMY         Medications (scheduled):      apixaban  2.5 mg Oral BID    bicalutamide  50 mg Oral Daily    cefTRIAXone (ROCEPHIN) IVPB  2 g Intravenous Q12H    clotrimazole-betamethasone 1-0.05%   Topical (Top) BID    escitalopram oxalate  10 mg Oral QHS    famotidine  20 mg Oral BID    insulin NPH  35 Units Subcutaneous BID    levothyroxine  75 mcg Oral Before  "breakfast    montelukast  10 mg Oral QHS    pirfenidone  801 mg Oral TID    predniSONE  30 mg Oral Daily    pregabalin  150 mg Oral QHS       Medications (infusions):         Medications (prn):     acetaminophen, acetaminophen, albuterol, bisacodyL, dextrose 50%, dextrose 50%, diphenhydrAMINE, glucagon (human recombinant), glucose, glucose, insulin aspart U-100, magnesium oxide, magnesium oxide, ondansetron, potassium, sodium phosphates, potassium, sodium phosphates, potassium, sodium phosphates, promethazine (PHENERGAN) IVPB, sodium chloride 0.9%    Family History:   Family History   Problem Relation Age of Onset    Heart disease Mother     Diabetes Mother     Hypertension Mother        Social History: Tobacco:   Social History     Tobacco Use   Smoking Status Former Smoker                                EtOH:   Social History     Substance and Sexual Activity   Alcohol Use None                                Drugs:   Social History     Substance and Sexual Activity   Drug Use Not on file                                Occupation: retired urologist                             Asbestos exposure: no    Physical Exam    Vital signs:  Temp:  [96.2 °F (35.7 °C)-98.3 °F (36.8 °C)]   Pulse:  [69-92]   Resp:  [16-28]   BP: ()/(52-78)   SpO2:  [86 %-97 %]     Intake/Output:     Intake/Output Summary (Last 24 hours) at 9/3/2020 1313  Last data filed at 9/3/2020 0500  Gross per 24 hour   Intake 320 ml   Output 2250 ml   Net -1930 ml        BMI: Estimated body mass index is 37.17 kg/m² as calculated from the following:    Height as of this encounter: 6' 4" (1.93 m).    Weight as of this encounter: 138.5 kg (305 lb 5.4 oz).    Physical Exam   Constitutional: He is oriented to person, place, and time. No distress.   Obese male, wearing BiPAP   HENT:   Head: Normocephalic and atraumatic.   Right Ear: External ear normal.   Left Ear: External ear normal.   Mouth/Throat: Oropharynx is clear and moist.   Wearing BiPAP "   Eyes: Pupils are equal, round, and reactive to light. Conjunctivae are normal. Right eye exhibits no discharge. Left eye exhibits no discharge. No scleral icterus.   Neck: Normal range of motion. Neck supple. No JVD present. No tracheal deviation present. No thyromegaly present.   Cardiovascular: Normal rate, regular rhythm, normal heart sounds and intact distal pulses. Exam reveals no gallop and no friction rub.   No murmur heard.  Pulmonary/Chest: Effort normal. No stridor. No respiratory distress. He has no wheezes. He has rales (few posterior rales).   BiPAP  + bronchial BS RLL   Abdominal: Soft. Bowel sounds are normal. He exhibits no distension. There is no abdominal tenderness. There is no rebound.   obese   Musculoskeletal: Normal range of motion.         General: Edema present. No tenderness.   Lymphadenopathy:     He has no cervical adenopathy.   Neurological: He is alert and oriented to person, place, and time. GCS score is 15.   Some generalized weakness   Skin: Skin is warm and dry. No rash noted. He is not diaphoretic. No erythema.   Psychiatric: Mood, memory, affect and judgment normal.   Nursing note and vitals reviewed.      Laboratory    Recent Labs   Lab 09/03/20  0634   WBC 12.19   RBC 4.89   HGB 15.9   HCT 47.0   *   MCV 96   MCH 32.5*   MCHC 33.8       Recent Labs   Lab 09/03/20  0634   CALCIUM 9.1   PROT 6.2      K 3.6   CO2 22*      BUN 29*   CREATININE 1.1   ALKPHOS 54*   ALT 24   AST 19   BILITOT 0.5       No results for input(s): PT, INR, APTT in the last 24 hours.    No results for input(s): CPK, CPKMB, TROPONINI, MB in the last 24 hours.    Additional labs:     LA - 8 -- 4.8    Procalcitonin - 0.05    Covid - neg    UA - noted    Microbiology:       Microbiology Results (last 7 days)     Procedure Component Value Units Date/Time    Blood culture [[id number]] Collected: 08/29/20 0316    Order Status: Completed Specimen: Blood Updated: 09/03/20 0432     Blood Culture,  Routine No growth after 5 days.    Blood culture [[id number]] Collected: 08/28/20 0413    Order Status: Completed Specimen: Blood Updated: 09/02/20 0432     Blood Culture, Routine No growth after 5 days.    Blood culture x two cultures. Draw prior to antibiotics. [[id number]]  (Abnormal) Collected: 08/27/20 0030    Order Status: Completed Specimen: Blood from Peripheral, Forearm, Left Updated: 08/30/20 0728     Blood Culture, Routine Gram stain aer bottle: Gram negative rods      Results called to and read back by: Oriana Paz RN in MICU by GARIMA      08/27/2020  11:27      [facility]  For susceptibility see order #[id number]      Narrative:      Aerobic and anaerobic    Blood culture x two cultures. Draw prior to antibiotics. [[id number]]  (Abnormal)  (Susceptibility) Collected: 08/27/20 0026    Order Status: Completed Specimen: Blood from Peripheral, Forearm, Right Updated: 08/30/20 0728     Blood Culture, Routine Gram stain aer bottle: Gram negative rods      Results called to and read back by: Oriana Paz RN in MICU by GARIMA      08/27/2020  11:27      [facility]    Narrative:      Aerobic and anaerobic    Clostridium difficile EIA [[id number]] Collected: 08/28/20 2332    Order Status: Canceled Specimen: Stool           Radiology        Additional Studies    EKG (8/27)  Vent. Rate : 070 BPM     Atrial Rate : 119 BPM      P-R Int : 000 ms          QRS Dur : 124 ms       QT Int : 402 ms       P-R-T Axes : 000 -88 083 degrees      QTc Int : 434 ms     Ventricular-paced rhythm   Abnormal ECG   No previous ECGs available    Ventilator Information    Oxygen Concentration (%):  [30-60] 30         No results for input(s): PH, PCO2, PO2, HCO3, POCSATURATED, BE in the last 72 hours.      Impression    Active Hospital Problems    Diagnosis  POA    *Pneumonia [J18.9]  Yes    Respiratory failure with hypoxia [J96.91]  Yes    History of pulmonary fibrosis [Z87.09]  Not Applicable    Gram-negative  bacteremia [R78.81]  Yes    Hypothyroidism [E03.9]  Yes    Diabetes mellitus [E11.9]  Yes    Acute on chronic respiratory failure with hypoxia [J96.21]  Yes    Thrombocytopenia [D69.6]  Yes    Atrial flutter [I48.92]  Yes    Presence of cardiac pacemaker [Z95.0]  Yes    Pulmonary fibrosis [J84.10]  Yes     · UIP on ESBRIET since about 2015  · PFT (11/19) - TLC - 74%, DLCO - 80%      Obesity with body mass index 30 or greater [E66.9]  Yes      Resolved Hospital Problems    Diagnosis Date Resolved POA    Hemoptysis [R04.2] 08/30/2020 Yes    Sepsis [A41.9] 08/30/2020 Yes    Sleep apnea [G47.30] 08/28/2020 Yes       Plan    · Continue antibiotics - will change to po  · BiPAP as needed - wean O2 as able, vapotherm as tolerated - wean O 2 as able, seems to be getting better but not ready to go home  · Try to get to [place] O2 as able - will need a high flow system for home  · + acinetobacter bacteremia  · Continue oral steroids  · ELIQUIS restarted - will watch for recurrent hemoptysis  · Monitor platelets  · PT, OT to work with pt  · Better but cannot DC home until O2 needs decrease - we are getting closer        Thank you for this consult.  I will follow with you while the patient is hospitalized.  Please call ([phone]) if you have any questions.    Timo Ambrocio MD

## 2020-09-03 NOTE — PLAN OF CARE
09/03/20 1353   Discharge Assessment   Assessment Type Discharge Planning Reassessment     Spoke with patient and his wife about his Preferences for Patient Choice Form for Home O2 and HH, explained to patient and family that they have the right to choose any agency, and a list of agencies were provided to patient and family to review, they verbalized an understanding, and they have No Sh Resp for the Home O2 and ok with first available on HH, pt's wife signed form, and form scanned into CM notes.

## 2020-09-03 NOTE — PROGRESS NOTES
"[facility]  Adult Nutrition   Progress Note (Follow-Up)    SUMMARY     Recommendations/Interventions:    Recommendation/Intervention: 1. Cont current diet order- appropriate and pt tolerating, 2  to assist in menu selections  Goals: 1 Pt to meet at least 75% of estimated needs via po intake  Nutrition Goal Status: goal met    Reason for Assessment  Reason For Assessment: RD follow-up  Diagnosis: other (see comments)(Pneumonia)  Relevant Medical History: sleep apnea, sepsis, pneumonia, DM, CHF, Pulmonary fibrosis  Interdisciplinary Rounds: attended    Nutrition Risk Screen  Nutrition Risk Screen: no indicators present     Nutrition/Diet History  Spiritual, Cultural Beliefs, Gnosticist Practices, Values that Affect Care: no  Food Allergies: NKFA  Factors Affecting Nutritional Intake: None identified at this time    Anthropometrics  Temp: 97.3 °F (36.3 °C)  Height Method: Stated  Height: 6' 4" (193 cm)  Height (inches): 76 in  Weight Method: Bed Scale  Weight: (!) 138.5 kg (305 lb 5.4 oz)  Weight (lb): (!) 305.34 lb  Ideal Body Weight (IBW), Male: 202 lb  % Ideal Body Weight, Male (lb): 151.16 %  BMI (Calculated): 37.2  BMI Grade: 35 - 39.9 - obesity - grade II     Weight History:  Wt Readings from Last 10 Encounters:   09/03/20 (!) 138.5 kg (305 lb 5.4 oz)   08/24/20 (P) 130.2 kg (287 lb)   09/23/19 124.7 kg (275 lb)   05/16/19 124.7 kg (275 lb)   }  Lab/Procedures/Meds: Pertinent Labs Reviewed  Clinical Chemistry:  Recent Labs   Lab 09/01/20  0407 09/02/20  0644 09/03/20  0634    140 140   K 4.0 4.0 3.6    105 105   CO2 23 24 22*   * 119* 97   BUN 35* 32* 29*   CREATININE 1.1 1.1 1.1   CALCIUM 8.8 9.1 9.1   PROT 5.7* 5.8* 6.2   ALBUMIN 3.5 3.5 3.6   BILITOT 0.9 0.4 0.5   ALKPHOS 53* 54* 54*   AST 18 18 19   ALT 22 23 24   ANIONGAP 10 11 13   ESTGFRAFRICA >60.0 >60.0 >60.0   EGFRNONAA >60.0 >60.0 >60.0   MG 1.9 1.9 1.9   PHOS 3.7 3.6 3.3     CBC:   Recent Labs   Lab " 09/03/20  0634   WBC 12.19   RBC 4.89   HGB 15.9   HCT 47.0   *   MCV 96   MCH 32.5*   MCHC 33.8     Medications: Pertinent Medications reviewed  Scheduled Meds:   apixaban  2.5 mg Oral BID    bicalutamide  50 mg Oral Daily    cefTRIAXone (ROCEPHIN) IVPB  2 g Intravenous Q12H    clotrimazole-betamethasone 1-0.05%   Topical (Top) BID    escitalopram oxalate  10 mg Oral QHS    famotidine  20 mg Oral BID    insulin NPH  35 Units Subcutaneous BID    levothyroxine  75 mcg Oral Before breakfast    montelukast  10 mg Oral QHS    pirfenidone  801 mg Oral TID    predniSONE  30 mg Oral Daily    pregabalin  150 mg Oral QHS     Continuous Infusions:  PRN Meds:.acetaminophen, acetaminophen, albuterol, bisacodyL, dextrose 50%, dextrose 50%, diphenhydrAMINE, glucagon (human recombinant), glucose, glucose, insulin aspart U-100, magnesium oxide, magnesium oxide, ondansetron, potassium, sodium phosphates, potassium, sodium phosphates, potassium, sodium phosphates, promethazine (PHENERGAN) IVPB, sodium chloride 0.9%    Estimated/Assessed Needs    Weight Used For Calorie Calculations: (!) 138.5 kg (305 lb 5.4 oz)  Energy Calorie Requirements (kcal): 2161 calories  Energy Need Method: Luis Palacio  Protein Requirements: 138 g (1.5 gm/kg IBW)  Estimated Fluid Requirement Method: RDA Method    Nutrition Prescription Ordered    Current Diet Order: Cardiac/Diabetic, Easy to Chew  Nutrition Order Comments: No concerns noted.  Pt tolerating diet and intake/appetite are good per pt.    Evaluation of Received Nutrient/Fluid Intake    Energy Calories Required: meeting needs  Protein Required: meeting needs  Fluid Required: meeting needs  Tolerance: tolerating  % Intake of Estimated Energy Needs: 75 - 100 %  % Meal Intake: 75 - 100 %    Intake/Output Summary (Last 24 hours) at 9/3/2020 1031  Last data filed at 9/3/2020 0500  Gross per 24 hour   Intake 320 ml   Output 2250 ml   Net -1930 ml      Nutrition Risk    Level of  Risk/Frequency of Follow-up: moderate   Monitor and Evaluation    Food and Nutrient Intake: energy intake, food and beverage intake  Food and Nutrient Adminstration: diet order  Knowledge/Beliefs/Attitudes: food and nutrition knowledge/skill  Physical Activity and Function: nutrition-related ADLs and IADLs  Anthropometric Measurements: weight, weight change  Biochemical Data, Medical Tests and Procedures: electrolyte and renal panel, gastrointestinal profile, glucose/endocrine profile, inflammatory profile, lipid profile  Nutrition-Focused Physical Findings: overall appearance     Nutrition Follow-Up    RD Follow-up?: Yes    Michelle Simons 9/3/20

## 2020-09-03 NOTE — PLAN OF CARE
09/03/20 1743   PRE-TX-O2   SpO2 (!) 94 %   Pulse 70   Resp (!) 22   Home Oxygen Qualification   Room Air SpO2 At Rest (!) 85 %   Room Air SpO2 on Exertion (!) 83 %   SpO2 During Exertion on O2 (!) 89 %   Heart Rate on O2 72 bpm   Exertion O2 LPM 7 LPM   SpO2 on Recovery 93 %   Recovery Heart Rate 70 bpm   Recovery O2 LPM 5 LPM   Home O2 Eval Comments   (patient got very short of breath)

## 2020-09-03 NOTE — PLAN OF CARE
Problem: Adult Inpatient Plan of Care  Goal: Plan of Care Review  Outcome: Ongoing, Progressing  Goal: Patient-Specific Goal (Individualization)  Outcome: Ongoing, Progressing  Goal: Absence of Hospital-Acquired Illness or Injury  Outcome: Ongoing, Progressing  Goal: Optimal Comfort and Wellbeing  Outcome: Ongoing, Progressing  Goal: Readiness for Transition of Care  Outcome: Ongoing, Progressing  Goal: Rounds/Family Conference  Outcome: Ongoing, Progressing     Problem: Fall Injury Risk  Goal: Absence of Fall and Fall-Related Injury  Outcome: Ongoing, Progressing     Problem: Diabetes Comorbidity  Goal: Blood Glucose Level Within Desired Range  Outcome: Ongoing, Progressing     Problem: Adjustment to Illness (Sepsis/Septic Shock)  Goal: Optimal Coping  Outcome: Ongoing, Progressing     Problem: Glycemic Control Impaired (Sepsis/Septic Shock)  Goal: Blood Glucose Level Within Desired Range  Outcome: Ongoing, Progressing     Problem: Nutrition Impaired (Sepsis/Septic Shock)  Goal: Optimal Nutrition Intake  Outcome: Ongoing, Progressing

## 2020-09-03 NOTE — PROGRESS NOTES
[facility]  Progress Note    Patient name: Manuel Casas  MRN: [id number]  Admit Date: 8/26/2020   LOS: 7 days     SUBJECTIVE:     Principal problem: Pneumonia    Interval History:  Patient was seen and examined bedside.  His oxygen requirement is improving ranging from 35-50% FiO2. Otherwise hemodynamically stable.  No acute events overnight as per nursing staff.      Scheduled Meds:   apixaban  2.5 mg Oral BID    bicalutamide  50 mg Oral Daily    cefTRIAXone (ROCEPHIN) IVPB  2 g Intravenous Q12H    clotrimazole-betamethasone 1-0.05%   Topical (Top) BID    escitalopram oxalate  10 mg Oral QHS    famotidine  20 mg Oral BID    insulin NPH  35 Units Subcutaneous BID    levothyroxine  75 mcg Oral Before breakfast    montelukast  10 mg Oral QHS    pirfenidone  801 mg Oral TID    predniSONE  30 mg Oral Daily    pregabalin  150 mg Oral QHS     Continuous Infusions:  PRN Meds:acetaminophen, acetaminophen, albuterol, bisacodyL, dextrose 50%, dextrose 50%, diphenhydrAMINE, glucagon (human recombinant), glucose, glucose, insulin aspart U-100, magnesium oxide, magnesium oxide, ondansetron, potassium, sodium phosphates, potassium, sodium phosphates, potassium, sodium phosphates, promethazine (PHENERGAN) IVPB, sodium chloride 0.9%    Review of patient's allergies indicates:  No Known Allergies    Review of Systems: As per interval history    OBJECTIVE:     Vital Signs (Most Recent)  Temp: 97.3 °F (36.3 °C) (09/03/20 0700)  Pulse: 70 (09/03/20 0733)  Resp: 18 (09/03/20 0733)  BP: (!) 144/68 (09/03/20 0700)  SpO2: (!) 89 % (09/03/20 0733)    Vital Signs Range (Last 24H):  Temp:  [96.2 °F (35.7 °C)-98.3 °F (36.8 °C)]   Pulse:  [69-92]   Resp:  [16-22]   BP: ()/(52-78)   SpO2:  [86 %-97 %]     I & O (Last 24H):    Intake/Output Summary (Last 24 hours) at 9/3/2020 0848  Last data filed at 9/3/2020 0500  Gross per 24 hour   Intake 560 ml   Output 3000 ml   Net -2440 ml       Physical  Exam:  General:  Sitting in the chair. Appears as stated age. Calm, obese  Eyes: No conjunctival injection. No scleral icterus.  ENT: Hearing grossly intact. No discharge from ears. No nasal discharge.   Neck: Supple, trachea midline. No JVD  CVS: RRR. No LE edema BL  Lungs:  Remains on Vapotherm with 35-50 FiO2, bilateral basal crackles noted.  No accessory muscle use.   Abdomen:  Soft, nontender and nondistended.  No organomegaly  Neuro: AOx3. Moves all extremities. Follows commands. 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    Active Hospital Problems    Diagnosis  POA    *Pneumonia [J18.9]  Yes    Respiratory failure with hypoxia [J96.91]  Yes    History of pulmonary fibrosis [Z87.09]  Not Applicable    Gram-negative bacteremia [R78.81]  Yes    Hypothyroidism [E03.9]  Yes    Diabetes mellitus [E11.9]  Yes    Acute on chronic respiratory failure with hypoxia [J96.21]  Yes    Thrombocytopenia [D69.6]  Yes    Atrial flutter [I48.92]  Yes    Presence of cardiac pacemaker [Z95.0]  Yes    Pulmonary fibrosis [J84.10]  Yes     · UIP on ESBRIET since about 2015  · PFT (11/19) - TLC - 74%, DLCO - 80%      Obesity with body mass index 30 or greater [E66.9]   Yes      Resolved Hospital Problems    Diagnosis Date Resolved POA    Hemoptysis [R04.2] 08/30/2020 Yes    Sepsis [A41.9] 08/30/2020 Yes    Sleep apnea [G47.30] 08/28/2020 Yes         Plan:   Overall clinical picture is consistent with sepsis due to Gram-negative bacterial pneumonia in the background of lung fibrosis and chronic hypoxic respiratory failure.  Significant improvement in oxygen requirement at last 24 hours.  Blood cultures are growing Acinetobacter.     Overall extremely slow recovery but improved remarkably in last 24 hours.  Of note patient has pre-existing lung fibrosis and poor functional status    low-dose Eliquis     Continue Rocephin based on susceptibility     Wean oxygen as tolerated    Continue p.o. steroids    CPAP q.h.s.    Accu-Cheks, sliding scale insulin    Bronchodilators as needed    Follow-up on 2D echo-still no read    PT, OT consult    Encourage incentive spirometer, out of bed to chair    Plan is to transition him to high-flow nasal cannula today and if he stays okay probably discharged tomorrow with high-flow concentrator at home      VTE Risk Mitigation (From admission, onward)         Ordered     apixaban tablet 2.5 mg  2 times daily      09/02/20 1001     Reason for No Pharmacological VTE Prophylaxis  Once     Question:  Reasons:  Answer:  Risk of Bleeding    08/27/20 0341     IP VTE HIGH RISK PATIENT  Once      08/27/20 0341     Place sequential compression device  Until discontinued      08/27/20 0341                  Department Hospital Medicine  [facility]  Kael Keith MD  Date of service: 09/03/2020

## 2020-09-04 VITALS
HEART RATE: 73 BPM | OXYGEN SATURATION: 96 % | BODY MASS INDEX: 37.18 KG/M2 | SYSTOLIC BLOOD PRESSURE: 144 MMHG | DIASTOLIC BLOOD PRESSURE: 91 MMHG | HEIGHT: 76 IN | WEIGHT: 305.31 LBS | RESPIRATION RATE: 18 BRPM | TEMPERATURE: 98 F

## 2020-09-04 PROBLEM — R78.81 GRAM-NEGATIVE BACTEREMIA: Status: RESOLVED | Noted: 2020-08-28 | Resolved: 2020-09-04

## 2020-09-04 LAB
ALBUMIN SERPL BCP-MCNC: 3.6 G/DL (ref 3.5–5.2)
ALP SERPL-CCNC: 60 U/L (ref 55–135)
ALT SERPL W/O P-5'-P-CCNC: 24 U/L (ref 10–44)
ANION GAP SERPL CALC-SCNC: 13 MMOL/L (ref 8–16)
AST SERPL-CCNC: 19 U/L (ref 10–40)
BASOPHILS NFR BLD: 0 % (ref 0–1.9)
BILIRUB SERPL-MCNC: 0.4 MG/DL (ref 0.1–1)
BUN SERPL-MCNC: 27 MG/DL (ref 8–23)
CALCIUM SERPL-MCNC: 8.8 MG/DL (ref 8.7–10.5)
CHLORIDE SERPL-SCNC: 103 MMOL/L (ref 95–110)
CO2 SERPL-SCNC: 22 MMOL/L (ref 23–29)
CREAT SERPL-MCNC: 1 MG/DL (ref 0.5–1.4)
DIFFERENTIAL METHOD: ABNORMAL
EOSINOPHIL NFR BLD: 0 % (ref 0–8)
ERYTHROCYTE [DISTWIDTH] IN BLOOD BY AUTOMATED COUNT: 14.1 % (ref 11.5–14.5)
EST. GFR  (AFRICAN AMERICAN): >60 ML/MIN/1.73 M^2
EST. GFR  (NON AFRICAN AMERICAN): >60 ML/MIN/1.73 M^2
GLUCOSE SERPL-MCNC: 104 MG/DL (ref 70–110)
GLUCOSE SERPL-MCNC: 120 MG/DL (ref 70–110)
HCT VFR BLD AUTO: 47.7 % (ref 40–54)
HGB BLD-MCNC: 15.8 G/DL (ref 14–18)
IMM GRANULOCYTES # BLD AUTO: ABNORMAL K/UL (ref 0–0.04)
IMM GRANULOCYTES NFR BLD AUTO: ABNORMAL % (ref 0–0.5)
LYMPHOCYTES NFR BLD: 36 % (ref 18–48)
MAGNESIUM SERPL-MCNC: 2 MG/DL (ref 1.6–2.6)
MCH RBC QN AUTO: 33.1 PG (ref 27–31)
MCHC RBC AUTO-ENTMCNC: 33.1 G/DL (ref 32–36)
MCV RBC AUTO: 100 FL (ref 82–98)
MONOCYTES NFR BLD: 3 % (ref 4–15)
MYELOCYTES NFR BLD MANUAL: 1 %
NEUTROPHILS NFR BLD: 59 % (ref 38–73)
NEUTS BAND NFR BLD MANUAL: 1 %
NRBC BLD-RTO: 0 /100 WBC
PHOSPHATE SERPL-MCNC: 3.2 MG/DL (ref 2.7–4.5)
PLATELET # BLD AUTO: 128 K/UL (ref 150–350)
PMV BLD AUTO: 10.4 FL (ref 9.2–12.9)
POTASSIUM SERPL-SCNC: 3.9 MMOL/L (ref 3.5–5.1)
PROT SERPL-MCNC: 6 G/DL (ref 6–8.4)
RBC # BLD AUTO: 4.77 M/UL (ref 4.6–6.2)
SODIUM SERPL-SCNC: 138 MMOL/L (ref 136–145)
WBC # BLD AUTO: 12.41 K/UL (ref 3.9–12.7)

## 2020-09-04 PROCEDURE — 63600175 PHARM REV CODE 636 W HCPCS: Performed by: INTERNAL MEDICINE

## 2020-09-04 PROCEDURE — 85027 COMPLETE CBC AUTOMATED: CPT

## 2020-09-04 PROCEDURE — 36415 COLL VENOUS BLD VENIPUNCTURE: CPT

## 2020-09-04 PROCEDURE — 94761 N-INVAS EAR/PLS OXIMETRY MLT: CPT

## 2020-09-04 PROCEDURE — 63600175 PHARM REV CODE 636 W HCPCS: Performed by: HOSPITALIST

## 2020-09-04 PROCEDURE — 84100 ASSAY OF PHOSPHORUS: CPT

## 2020-09-04 PROCEDURE — 27000221 HC OXYGEN, UP TO 24 HOURS

## 2020-09-04 PROCEDURE — 80053 COMPREHEN METABOLIC PANEL: CPT

## 2020-09-04 PROCEDURE — 25000003 PHARM REV CODE 250: Performed by: HOSPITALIST

## 2020-09-04 PROCEDURE — 99900035 HC TECH TIME PER 15 MIN (STAT)

## 2020-09-04 PROCEDURE — 85007 BL SMEAR W/DIFF WBC COUNT: CPT

## 2020-09-04 PROCEDURE — 83735 ASSAY OF MAGNESIUM: CPT

## 2020-09-04 RX ORDER — APIXABAN 5 MG/1
5 TABLET, FILM COATED ORAL 2 TIMES DAILY
Start: 2020-09-04

## 2020-09-04 RX ORDER — PREDNISONE 10 MG/1
TABLET ORAL
Qty: 30 TABLET | Refills: 0
Start: 2020-09-04 | End: 2021-08-09

## 2020-09-04 RX ORDER — LEVOFLOXACIN 750 MG/1
750 TABLET ORAL DAILY
Qty: 6 TABLET | Refills: 0 | Status: SHIPPED | OUTPATIENT
Start: 2020-09-04 | End: 2020-09-10

## 2020-09-04 RX ADMIN — APIXABAN 2.5 MG: 2.5 TABLET, FILM COATED ORAL at 08:09

## 2020-09-04 RX ADMIN — PIRFENIDONE 801 MG: 801 TABLET, FILM COATED ORAL at 08:09

## 2020-09-04 RX ADMIN — PREDNISONE 30 MG: 5 TABLET ORAL at 08:09

## 2020-09-04 RX ADMIN — HUMAN INSULIN 35 UNITS: 100 INJECTION, SUSPENSION SUBCUTANEOUS at 08:09

## 2020-09-04 RX ADMIN — FAMOTIDINE 20 MG: 20 TABLET ORAL at 08:09

## 2020-09-04 RX ADMIN — BICALUTAMIDE 50 MG: 50 TABLET, FILM COATED ORAL at 08:09

## 2020-09-04 RX ADMIN — CEFTRIAXONE 2 G: 2 INJECTION, SOLUTION INTRAVENOUS at 02:09

## 2020-09-04 RX ADMIN — LEVOTHYROXINE SODIUM 75 MCG: 25 TABLET ORAL at 05:09

## 2020-09-04 NOTE — PLAN OF CARE
09/04/20 1117   Final Note   Assessment Type Final Discharge Note   Anticipated Discharge Disposition Home-Health   Post-Acute Status   Post-Acute Authorization Home Health;HME   HME Status Set-up Complete   Home Health Status Set-up Complete   Part D Coverage [employer]   Patient choice form signed by patient/caregiver List with quality metrics by geographic area provided   Discharge Delays None known at this time

## 2020-09-04 NOTE — PLAN OF CARE
Built and sent referrals for Home O2 to [facility] and Mrs Young rec'd orders and pt wife a nurse and she feels comfortable with handling pts O2 at home. Also sent HH referral to [facility] and Mrs Weathers waiting on referral, she has this  callback number as well.       09/04/20 1112   Post-Acute Status   Post-Acute Authorization Home Health;HME   HME Status Set-up Complete  ([facility] previously supplies O2 for pt, and Order for HD BSC also, done)   Home Health Status Referrals Sent  ([facility])   Part D Coverage [employer]   Patient choice form signed by patient/caregiver List with quality metrics by geographic area provided   Discharge Delays None known at this time   Discharge Plan   Discharge Plan A Home with family;Home Health   Discharge Plan B Home with family;Home Health

## 2020-09-04 NOTE — PLAN OF CARE
09/03/20 2300   Patient Assessment/Suction   Level of Consciousness (AVPU) alert   Respiratory Effort Unlabored;Normal   PRE-TX-O2   O2 Device (Oxygen Therapy) CPAP   SpO2 (!) 90 %   Pulse Oximetry Type Continuous   $ Pulse Oximetry - Multiple Charge Pulse Oximetry - Multiple   Pulse 70   Resp 15   Preset CPAP/BiPAP Settings   Mode Of Delivery CPAP  (Home unit)   Respiratory Evaluation   $ Care Plan Tech Time 15 min

## 2020-09-04 NOTE — DISCHARGE SUMMARY
[facility]  Discharge Summary      Patient Name: Manuel Casas  MRN: [id number]  Admission Date: 8/26/2020  Hospital Length of Stay: 8 days  Discharge Date and Time:  09/04/2020 3:44 PM  Attending Physician: Autumn att. 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 diabetes mellitus type 2, sleep apnea - on CPAP at night.  He presented to the emergency department with hemoptysis, fever, cough, shortness of breath. The patient and his wife report that he was seen at an Urgent care on 8/12/2020 with reparatory problems. He was given azithromycin.  He reports improvement after complete the course.  He saw a pulmonologist, Dr. Ambrocio for the first time on 8/24/2020 and was placed on taper dose if steroids. He reports some improvement.    PTA, he reports having chills and coughing up bright red phlegm.  He had some nausea and vomited twice  His wife reports that he appeared to be very short of breath and confused.  She called her PCP and was advised to activate the EMS. Per ED physician, EMS found him to have oxygen saturation of 94% on 4L but desaturation when he moved. His O2 was increased to 6L NC. On arrival to the ED, he was noted to have saturation of 88%. His oxygen saturation and mental status was significant improved with high flow O2. He is on CPAP at home and currently on bipap. He denies chest pain. He states that he had abdominal cramp and diarrhea earlier. His wife states that she ate peaches purchased at [employer]. She was notified that they have Salmonella.  He denies urinary symptoms.     His wife reports that he had 3 negative COVID - 19 screen  thus far. He has no sick contacts.    * No surgery found *      Hospital Course:   During his prolonged hospital stay patient was treated for acute on chronic hypoxic respiratory failure due to Acinetobacter pneumonia, Acinetobacter bacteremia in the background of his chronic pulmonary fibrosis.  Patient had very very slow recovery however in last 48 hours he recovered significantly and is currently breathing on 4-6 L oxygen via high-flow nasal cannula.  At baseline he is on 4 L oxygen at home.  Today was cleared for discharge by Pulmonary.  We arranged home health and high-flow concentrator at home.  Considering his slow recovery and lung fibrosis we decided to treat him for total 14 days of antibiotics.  He was also sent home on a tapering steroid regimen.  He will need 20 mg prednisone for 5 days followed by 10 mg 5 days.     Instructions provided to follow up with primary care physician as outpatient. Patient verbalized understanding and is aware to contact primary care physician or return to ED if new or worsening symptoms.    Physical Exam:  General:  Sitting in the chair. Appears as stated age. Calm, obese  Eyes: No conjunctival injection. No scleral icterus.  ENT: Hearing grossly intact. No discharge from ears. No nasal discharge.   Neck: Supple, trachea midline. No JVD  CVS: RRR. No LE edema BL  Lungs:  Remains on 4L oxygen via high-flow, bilateral basal crackles noted-much bed.  No accessory muscle use.   Abdomen:  Soft, nontender and nondistended.  No organomegaly  Neuro: AOx3. Moves all extremities. Follows commands. Responds appropriately   Skin:  No rash or erythema noted, mild erythema noted around sacral area likely due to allergy from dressing pad    Vital signs reviewed.  Nursing notes reviewed.          Consults:   Consults (From admission, onward)        Status Ordering Provider     Inpatient consult to Pulmonology  Once     Provider:  Timo Ambrocio MD    Completed LEISA MILLER     "      No new Assessment & Plan notes have been filed under this hospital service since the last note was generated.  Service: Hospital Medicine    Final Active Diagnoses:    Diagnosis Date Noted POA    PRINCIPAL PROBLEM:  Pneumonia [J18.9] 08/27/2020 Yes    Respiratory failure with hypoxia [J96.91] 08/30/2020 Yes    History of pulmonary fibrosis [Z87.09]  Not Applicable    Hypothyroidism [E03.9] 08/27/2020 Yes    Diabetes mellitus [E11.9] 08/27/2020 Yes    Acute on chronic respiratory failure with hypoxia [J96.21] 08/27/2020 Yes    Thrombocytopenia [D69.6] 08/27/2020 Yes    Atrial flutter [I48.92] 08/24/2020 Yes    Presence of cardiac pacemaker [Z95.0] 08/24/2020 Yes    Pulmonary fibrosis [J84.10] 08/24/2020 Yes    Obesity with body mass index 30 or greater [E66.9] 08/24/2020 Yes      Problems Resolved During this Admission:    Diagnosis Date Noted Date Resolved POA    Hemoptysis [R04.2]  08/30/2020 Yes    Gram-negative bacteremia [R78.81] 08/28/2020 09/04/2020 Yes    Sepsis [A41.9] 08/27/2020 08/30/2020 Yes    Sleep apnea [G47.30] 08/24/2020 08/28/2020 Yes       Discharged Condition: fair    [place] [facility]    [place] Up:  Follow-up Information     Timo Ambrocio MD In 2 weeks.    Specialty: Pulmonary Disease  Contact information:  [address] [place] [address]  [phone]  [facility] [place] [id number] [phone]                 Patient Instructions:      3 IN 1 COMMODE FOR HOME USE     Order Specific Question Answer Comments   Type: Heavy duty    Height: 6' 4" (1.93 m)    Weight: 138.5 kg (305 lb 5.4 oz)    Does patient have medical equipment at home? walker, rolling    Does patient have medical equipment at home? wheelchair    Does patient have medical equipment at home? shower chair    Does patient have medical equipment at home? oxygen    Length of need (1-99 months): 99      OXYGEN FOR HOME USE   Order Comments: Patient needs high-flow concentrator as he needs 7 L oxygen upon " "ambulation     Order Specific Question Answer Comments   Liter Flow 7    Duration Continuous    Qualifying SpO2: 85    Testing done at: Rest    Route nasal cannula    Portable mode: continuous    Device home concentrator with portable unit High-flow concentrator   Length of need (in months): 99 mos    Patient condition with qualifying saturation other chronic pulmonary condition Lung fibrosis   Height: 6' 4" (1.93 m)    Weight: 138.5 kg (305 lb 5.4 oz)    Does patient have medical equipment at home? walker, rolling    Does patient have medical equipment at home? wheelchair    Does patient have medical equipment at home? shower chair    Does patient have medical equipment at home? oxygen    Alternative treatment measures have been tried or considered and deemed clinically ineffective. Yes      Ambulatory referral/consult to Home Health   Standing Status: Future   Referral Priority: Routine Referral Type: Home Health Care   Referral Reason: Specialty Services Required   Requested Specialty: Home Health Services   Number of Visits Requested: 1     Diet diabetic     Diet Cardiac     Notify your health care provider if you experience any of the following:  temperature >100.4     Notify your health care provider if you experience any of the following:  difficulty breathing or increased cough     Activity as tolerated       Significant Diagnostic Studies: Labs:   CMP   Recent Labs   Lab 09/03/20  0634 09/04/20  0454    138   K 3.6 3.9    103   CO2 22* 22*   GLU 97 120*   BUN 29* 27*   CREATININE 1.1 1.0   CALCIUM 9.1 8.8   PROT 6.2 6.0   ALBUMIN 3.6 3.6   BILITOT 0.5 0.4   ALKPHOS 54* 60   AST 19 19   ALT 24 24   ANIONGAP 13 13   ESTGFRAFRICA >60.0 >60.0   EGFRNONAA >60.0 >60.0    and CBC   Recent Labs   Lab 09/03/20  0634 09/04/20  0454   WBC 12.19 12.41   HGB 15.9 15.8   HCT 47.0 47.7   * 128*       Pending Diagnostic Studies:     Procedure Component Value Units Date/Time    Echo Color Flow Doppler? " Yes [[id number]] Resulted: 08/27/20 1515    Order Status: Sent Lab Status: In process Updated: 08/27/20 1517     BSA 2.65 m2      TDI SEPTAL 0.10 m/s      Right Atrial Pressure (from IVC) 3 mmHg      AORTIC VALVE CUSP SEPERATION 0.80 cm      TDI LATERAL 0.11 m/s      PV PEAK VELOCITY 53.08 cm/s      LVIDD 4.00 cm      IVS 1.04 cm      PW 1.04 cm      Ao root annulus 2.32 cm      LVIDS 2.80 cm      FS 30 %      LV mass 134.35 g      LA size 3.20 cm      Left Ventricle Relative Wall Thickness 0.52 cm      AV mean gradient 2 mmHg      AV valve area 1.65 cm2      AV Velocity Ratio 62.35     AV index (prosthetic) 0.52     Mean e' 0.11 m/s      E wave decelartion time 377.09 msec      LVOT diameter 2.02 cm      LVOT area 3.2 cm2      LVOT peak regulo 62.97 m/s      LVOT peak VTI 10.77 cm      Ao peak regulo 1.01 m/s      Ao VTI 20.85 cm      LVOT stroke volume 34.50 cm3      AV peak gradient 4 mmHg      TV rest pulmonary artery pressure 28 mmHg      TR Max Regulo 2.51 m/s      LV Mass Index 52 g/m2      Triscuspid Valve Regurgitation Peak Gradient 25 mmHg     Narrative:      · The estimated PA systolic pressure is 28 mmHg.  · Concentric left ventricular remodeling.       Impression:               Medications:  Reconciled Home Medications:      Medication List      START taking these medications    levoFLOXacin 750 MG tablet  Commonly known as: LEVAQUIN  Take 1 tablet (750 mg total) by mouth once daily. for 6 days        CHANGE how you take these medications    ELIQUIS 5 mg Tab  Generic drug: apixaban  Take 1 tablet (5 mg total) by mouth 2 (two) times daily.  What changed: how to take this     predniSONE 10 MG tablet  Commonly known as: DELTASONE  Take 2 a day x 5 days then 1 a day x 5 days  What changed: additional instructions        CONTINUE taking these medications    ACCU-CHEK SMARTVIEW TEST STRIP Strp  Generic drug: blood sugar diagnostic     bicalutamide 50 MG Tab  Commonly known as: CASODEX  TK 1 T PO QD     celecoxib 200 MG  "capsule  Commonly known as: CeleBREX  Take 200 mg by mouth.     clotrimazole-betamethasone 1-0.05% cream  Commonly known as: LOTRISONE  AIRAM EXT AA BID     ESBRIET 801 mg Tab  Generic drug: pirfenidone  3 (three) times daily.     escitalopram oxalate 10 MG tablet  Commonly known as: LEXAPRO  20 mg every morning.     famotidine 20 MG tablet  Commonly known as: PEPCID  Take 20 mg by mouth 2 (two) times daily.     furosemide 40 MG tablet  Commonly known as: LASIX  40 mg every other day.     levothyroxine 75 MCG tablet  Commonly known as: SYNTHROID  TK 1 T PO  D ON EMPTY STOMACH 1 H APART FROM FOOD/MEDS     montelukast 10 mg tablet  Commonly known as: SINGULAIR  Take 10 mg by mouth every evening.     NovoLIN N NPH U-100 Insulin 100 unit/mL injection  Generic drug: insulin NPH  45 Units 2 (two) times daily.     potassium chloride 10 MEQ Tbsr  Commonly known as: KLOR-CON  every other day.     pregabalin 150 MG capsule  Commonly known as: LYRICA  Take by mouth every evening.     ULTRA COMFORT INSULIN SYRINGE 1 mL 29 gauge x 1/2" Syrg  Generic drug: insulin syringe-needle U-100     VENTOLIN HFA 90 mcg/actuation inhaler  Generic drug: albuterol  INL 1 TO 2 PFS PO Q 4 TO 6 H PRN            Indwelling Lines/Drains at time of discharge:   Lines/Drains/Airways     None                 Time spent on the discharge of patient: 42 minutes  Patient was seen and examined on the date of discharge and determined to be suitable for discharge.         Kael Keith MD  Department of Hospital Medicine  Counts include 234 beds at the [facility]  "

## 2020-09-04 NOTE — PLAN OF CARE
Problem: Occupational Therapy Goal  Goal: Occupational Therapy Goal  Description: Goals to be met by: discharge     Patient will increase functional independence with ADLs by performing:    UE Dressing with Stand-by Assistance.  LE Dressing with Stand-by Assistance.  Grooming while standing at sink with Stand-by Assistance.  Toileting from toilet with Stand-by Assistance for hygiene and clothing management.   Toilet transfer to toilet with Stand-by Assistance.  Perform 30 minutes of sitting/standing ADL activity with O2 sats above 90%.    Outcome: Adequate for Care Transition   Autumn carrasquillo met , pt discharged to home with HH.

## 2020-09-04 NOTE — PT/OT/SLP DISCHARGE
Occupational Therapy Discharge Summary    Manuel Casas  MRN: [id number]   Principal Problem: Pneumonia      Patient Discharged from acute Occupational Therapy on 9/4/2020  Please refer to prior OT note dated 9/3/3030 for functional status.    Assessment:      Patient appropriate for care in another setting.    Objective:     GOALS:   Multidisciplinary Problems     Occupational Therapy Goals        Problem: Occupational Therapy Goal    Goal Priority Disciplines Outcome Interventions   Occupational Therapy Goal     OT, PT/OT Adequate for Care Transition    Description: Goals to be met by: discharge     Patient will increase functional independence with ADLs by performing:    UE Dressing with Stand-by Assistance.  LE Dressing with Stand-by Assistance.  Grooming while standing at sink with Stand-by Assistance.  Toileting from toilet with Stand-by Assistance for hygiene and clothing management.   Toilet transfer to toilet with Stand-by Assistance.  Perform 30 minutes of sitting/standing ADL activity with O2 sats above 90%.                     Reasons for Discontinuation of Therapy Services  Transfer to alternate level of care.      Plan:     Patient Discharged to: Home with Home Health Service    Rona Ellis, MYRA  9/4/2020

## 2020-09-04 NOTE — PLAN OF CARE
Mrs Nicole from [facility] called this CM that they did not receive the signed HH order, only the HH order atached with [place], built and sent signed Ambul HH Orders to their fax [phone] and they have this CM callback number.

## 2020-09-04 NOTE — HOSPITAL COURSE
During his prolonged hospital stay patient was treated for acute on chronic hypoxic respiratory failure due to Acinetobacter pneumonia, Acinetobacter bacteremia in the background of his chronic pulmonary fibrosis.  Patient had very very slow recovery however in last 48 hours he recovered significantly and is currently breathing on 4-6 L oxygen via high-flow nasal cannula.  At baseline he is on 4 L oxygen at home.  Today was cleared for discharge by Pulmonary.  We arranged home health and high-flow concentrator at home.  Considering his slow recovery and lung fibrosis we decided to treat him for total 14 days of antibiotics.  He was also sent home on a tapering steroid regimen.  He will need 20 mg prednisone for 5 days followed by 10 mg 5 days.     Instructions provided to follow up with primary care physician as outpatient. Patient verbalized understanding and is aware to contact primary care physician or return to ED if new or worsening symptoms.    Physical Exam:  General:  Sitting in the chair. Appears as stated age. Calm, obese  Eyes: No conjunctival injection. No scleral icterus.  ENT: Hearing grossly intact. No discharge from ears. No nasal discharge.   Neck: Supple, trachea midline. No JVD  CVS: RRR. No LE edema BL  Lungs:  Remains on 4L oxygen via high-flow, bilateral basal crackles noted-much bed.  No accessory muscle use.   Abdomen:  Soft, nontender and nondistended.  No organomegaly  Neuro: AOx3. Moves all extremities. Follows commands. Responds appropriately   Skin:  No rash or erythema noted, mild erythema noted around sacral area likely due to allergy from dressing pad    Vital signs reviewed.  Nursing notes reviewed.

## 2020-09-04 NOTE — PLAN OF CARE
09/04/20 1207   Discharge Assessment   Assessment Type Discharge Planning Reassessment     Mrs Young with [facility] called this CM needing the signed orders for the Hm O2 and the BSC, the DC Summary orders not showing signed. Built and sent to their fax [phone] and Mrs young has this CM callback number and number on referral.

## 2020-09-04 NOTE — PLAN OF CARE
09/04/20 0736   Patient Assessment/Suction   Level of Consciousness (AVPU) alert   Respiratory Effort Normal;Unlabored   Expansion/Accessory Muscles/Retractions expansion symmetric;no use of accessory muscles   All Lung Fields Breath Sounds clear;equal bilaterally   Rhythm/Pattern, Respiratory pattern regular   PRE-TX-O2   O2 Device (Oxygen Therapy) High Flow nasal Cannula   $ Is the patient on Low Flow Oxygen? Yes   Flow (L/min) 4   SpO2 96 %   Pulse Oximetry Type Continuous   $ Pulse Oximetry - Multiple Charge Pulse Oximetry - Multiple   Pulse 73   Resp 18   Positioning HOB elevated 45 degrees   Inhaler   $ Inhaler Charges PRN treatment not required   Daily Review of Necessity (Inhaler) completed   Preset CPAP/BiPAP Settings   Mode Of Delivery   (home cpap for QHS use)   Respiratory Evaluation   $ Care Plan Tech Time 15 min

## 2020-09-04 NOTE — NURSING
Pt d/c to home with home O2. Discharge instructions and education explained, pt verbalized understanding. Pt belongings reconciled. Pt transported out of facility via w/c and escorted by wife to private vehicle

## 2020-09-04 NOTE — PROGRESS NOTES
"Pulmonary/Critical Care Progress Note      Patient name: Manuel Casas  MRN: [id number]  Date: 09/04/2020    Admit Date: 8/26/2020  Consult Requested By: Kael Keith MD    Reason for Consult: REspiratory failure, pneumonia, pulm fibrosis    HPI:    8/27/2020 - 88 yo male who I met 2 days ago has h/o pulmonary fibrosis (fairly mild but progressive, on ESBRIET, home O2) had recent respiratory illness treated at Urgent Care and when I saw him he was stablle but with some increased dyspnea and I placed him on a short prednisone taper.  Yesterday during the day he felt well, SOB was better and he was active.  That evening he had an episode of chills and rigors and coughed up some bloody sputum.  He then began to develop fever and came to ER.  Covid test was negative and xrays show a RLL infiltrate c/w CAP.  He has needed BiPAP with 100% O2 and is now admitted for further treatment.  He states that he would prefer to not be intubated unless "absolutely necessary".  ROS as below.  He has not had any corona virus exposures and has been practicing social distancing.    8/31/2020 - Events of the weekend noted, no new issues reported and has been transferred to floor.  Doing better but still with high FiO2 requirements with both vapotherm and BiPAP.  He states that he feels better overall.  CXR shows some clearing at right lung base.    9/1/2020 - Stable overnight, pt sleeping with BiPAP when I saw him (I didn't waken pt).  No new issues reported.  He is still requiring significant O2 (70% vapotherm) and cannot be DC home yet.  Cumulative I/O are negative about 3.3 liters    9/2/2020 - Stable overnight and no new complaints, O2 decreased to 65%.  CXR today looks better.  He is working with therapy and has been out of bed.    9/3/2020 - Stable overnight and sleeping with BiPAP on when I saw him (on 35% O2).  No new issues reported.  I did not waken pt and case was discussed with Dr Keith.    9/4/2020 - Stable overnigth " and improving - walk study showed need for 7 LPM O32 (arrangements being made for high flow NC).  Overall better.  WIll need to complete 14 days total of antibiotics (d/w Dr Keith).    Review of Systems    Review of Systems   Constitutional: Positive for chills, fever and malaise/fatigue. Negative for diaphoresis and weight loss.   HENT: Negative for congestion, nosebleeds and sinus pain.    Eyes: Negative for pain.   Respiratory: Positive for cough, hemoptysis, sputum production and shortness of breath. Negative for wheezing and stridor.    Cardiovascular: Positive for leg swelling (chronic and not worse). Negative for chest pain, palpitations, orthopnea, claudication and PND.   Gastrointestinal: Positive for diarrhea. Negative for abdominal pain, blood in stool, constipation, heartburn, nausea and vomiting.   Genitourinary: Negative for dysuria, frequency, hematuria and urgency.   Musculoskeletal: Negative for back pain, falls, joint pain, myalgias and neck pain.   Skin: Negative for itching and rash.   Neurological: Positive for weakness (some generalized). Negative for dizziness, tingling, tremors, sensory change, speech change, focal weakness, seizures, loss of consciousness and headaches.   Psychiatric/Behavioral: Negative for depression, substance abuse and suicidal ideas. The patient is not nervous/anxious.        Past Medical History    Past Medical History:   Diagnosis Date    Atrial flutter     Cancer     prostate    Congestive heart failure     Diabetes mellitus, type 2     Heart failure     right sided    Hyperlipidemia     Pulmonary fibrosis     Sleep apnea        Past Surgical History    Past Surgical History:   Procedure Laterality Date    ADENOIDECTOMY      CARDIAC PACEMAKER PLACEMENT      HERNIA REPAIR      left, umbilical    TONSILLECTOMY         Medications (scheduled):      apixaban  2.5 mg Oral BID    bicalutamide  50 mg Oral Daily    cefTRIAXone (ROCEPHIN) IVPB  2 g Intravenous  "Q12H    clotrimazole-betamethasone 1-0.05%   Topical (Top) BID    escitalopram oxalate  10 mg Oral QHS    famotidine  20 mg Oral BID    insulin NPH  35 Units Subcutaneous BID    levothyroxine  75 mcg Oral Before breakfast    montelukast  10 mg Oral QHS    pirfenidone  801 mg Oral TID    predniSONE  30 mg Oral Daily    pregabalin  150 mg Oral QHS       Medications (infusions):         Medications (prn):     acetaminophen, acetaminophen, albuterol, bisacodyL, dextrose 50%, dextrose 50%, diphenhydrAMINE, glucagon (human recombinant), glucose, glucose, insulin aspart U-100, magnesium oxide, magnesium oxide, ondansetron, potassium, sodium phosphates, potassium, sodium phosphates, potassium, sodium phosphates, promethazine (PHENERGAN) IVPB, sodium chloride 0.9%    Family History:   Family History   Problem Relation Age of Onset    Heart disease Mother     Diabetes Mother     Hypertension Mother        Social History: Tobacco:   Social History     Tobacco Use   Smoking Status Former Smoker                                EtOH:   Social History     Substance and Sexual Activity   Alcohol Use None                                Drugs:   Social History     Substance and Sexual Activity   Drug Use Not on file                                Occupation: retired urologist                             Asbestos exposure: no    Physical Exam    Vital signs:  Temp:  [97.5 °F (36.4 °C)-98.3 °F (36.8 °C)]   Pulse:  [70-89]   Resp:  [15-28]   BP: (120-147)/(59-91)   SpO2:  [90 %-96 %]     Intake/Output:     Intake/Output Summary (Last 24 hours) at 9/4/2020 0962  Last data filed at 9/4/2020 0325  Gross per 24 hour   Intake 400 ml   Output 1300 ml   Net -900 ml        BMI: Estimated body mass index is 37.17 kg/m² as calculated from the following:    Height as of this encounter: 6' 4" (1.93 m).    Weight as of this encounter: 138.5 kg (305 lb 5.4 oz).    Physical Exam   Constitutional: He is oriented to person, place, and time. " No distress.   Obese male, wearing BiPAP   HENT:   Head: Normocephalic and atraumatic.   Right Ear: External ear normal.   Left Ear: External ear normal.   Mouth/Throat: Oropharynx is clear and moist.   Wearing BiPAP   Eyes: Pupils are equal, round, and reactive to light. Conjunctivae are normal. Right eye exhibits no discharge. Left eye exhibits no discharge. No scleral icterus.   Neck: Normal range of motion. Neck supple. No JVD present. No tracheal deviation present. No thyromegaly present.   Cardiovascular: Normal rate, regular rhythm, normal heart sounds and intact distal pulses. Exam reveals no gallop and no friction rub.   No murmur heard.  Pulmonary/Chest: Effort normal. No stridor. No respiratory distress. He has no wheezes. He has rales (few posterior rales).   BiPAP  + bronchial BS RLL   Abdominal: Soft. Bowel sounds are normal. He exhibits no distension. There is no abdominal tenderness. There is no rebound.   obese   Musculoskeletal: Normal range of motion.         General: Edema present. No tenderness.   Lymphadenopathy:     He has no cervical adenopathy.   Neurological: He is alert and oriented to person, place, and time. GCS score is 15.   Some generalized weakness   Skin: Skin is warm and dry. No rash noted. He is not diaphoretic. No erythema.   Psychiatric: Mood, memory, affect and judgment normal.   Nursing note and vitals reviewed.      Laboratory    Recent Labs   Lab 09/04/20  0454   WBC 12.41   RBC 4.77   HGB 15.8   HCT 47.7   *   *   MCH 33.1*   MCHC 33.1       Recent Labs   Lab 09/04/20  0454   CALCIUM 8.8   PROT 6.0      K 3.9   CO2 22*      BUN 27*   CREATININE 1.0   ALKPHOS 60   ALT 24   AST 19   BILITOT 0.4       No results for input(s): PT, INR, APTT in the last 24 hours.    No results for input(s): CPK, CPKMB, TROPONINI, MB in the last 24 hours.    Additional labs:     LA - 8 -- 4.8    Procalcitonin - 0.05    Covid - neg    UA - noted    Microbiology:        Microbiology Results (last 7 days)     Procedure Component Value Units Date/Time    Blood culture [[id number]] Collected: 08/29/20 0316    Order Status: Completed Specimen: Blood Updated: 09/03/20 0432     Blood Culture, Routine No growth after 5 days.    Blood culture [[id number]] Collected: 08/28/20 0413    Order Status: Completed Specimen: Blood Updated: 09/02/20 0432     Blood Culture, Routine No growth after 5 days.    Blood culture x two cultures. Draw prior to antibiotics. [[id number]]  (Abnormal) Collected: 08/27/20 0030    Order Status: Completed Specimen: Blood from Peripheral, Forearm, Left Updated: 08/30/20 0728     Blood Culture, Routine Gram stain aer bottle: Gram negative rods      Results called to and read back by: Oriana Paz RN in MICU by GARIMA      08/27/2020  11:27      [facility]  For susceptibility see order #[id number]      Narrative:      Aerobic and anaerobic    Blood culture x two cultures. Draw prior to antibiotics. [[id number]]  (Abnormal)  (Susceptibility) Collected: 08/27/20 0026    Order Status: Completed Specimen: Blood from Peripheral, Forearm, Right Updated: 08/30/20 0728     Blood Culture, Routine Gram stain aer bottle: Gram negative rods      Results called to and read back by: Oriana Paz RN in MICU by GARIMA      08/27/2020  11:27      [facility]    Narrative:      Aerobic and anaerobic    Clostridium difficile EIA [[id number]] Collected: 08/28/20 2332    Order Status: Canceled Specimen: Stool           Radiology        Additional Studies    EKG (8/27)  Vent. Rate : 070 BPM     Atrial Rate : 119 BPM      P-R Int : 000 ms          QRS Dur : 124 ms       QT Int : 402 ms       P-R-T Axes : 000 -88 083 degrees      QTc Int : 434 ms     Ventricular-paced rhythm   Abnormal ECG   No previous ECGs available    Ventilator Information    Oxygen Concentration (%):  [30] 30         No results for input(s): PH, PCO2, PO2, HCO3, POCSATURATED, BE in the last 72  hours.      Impression    Active Hospital Problems    Diagnosis  POA    *Pneumonia [J18.9]  Yes    Respiratory failure with hypoxia [J96.91]  Yes    History of pulmonary fibrosis [Z87.09]  Not Applicable    Hypothyroidism [E03.9]  Yes    Diabetes mellitus [E11.9]  Yes    Acute on chronic respiratory failure with hypoxia [J96.21]  Yes    Thrombocytopenia [D69.6]  Yes    Atrial flutter [I48.92]  Yes    Presence of cardiac pacemaker [Z95.0]  Yes    Pulmonary fibrosis [J84.10]  Yes     · UIP on ESBRIET since about 2015  · PFT (11/19) - TLC - 74%, DLCO - 80%      Obesity with body mass index 30 or greater [E66.9]  Yes      Resolved Hospital Problems    Diagnosis Date Resolved POA    Hemoptysis [R04.2] 08/30/2020 Yes    Gram-negative bacteremia [R78.81] 09/04/2020 Yes    Sepsis [A41.9] 08/30/2020 Yes    Sleep apnea [G47.30] 08/28/2020 Yes       Plan    · OK to DC home on    Prednisone taper  PO antibiotics to complete 14 days total  Home O2 4 LPM at rest 7 LPM with exertion - needs high flow concentrator  Big bedside commode  Home Health  Wean prednisone to off  Continue usual home meds  Call me next week to let me know how he is doing  Has follow up appointment in 2-3 weeks already scheduled        Thank you for this consult.  I will follow with you while the patient is hospitalized.  Please call ([phone]) if you have any questions.    Timo Ambrocio MD

## 2020-09-05 LAB
AORTIC ROOT ANNULUS: 2.32 CM
AORTIC VALVE CUSP SEPERATION: 0.8 CM
AV INDEX (PROSTH): 0.52
AV MEAN GRADIENT: 2 MMHG
AV PEAK GRADIENT: 4 MMHG
AV VALVE AREA: 1.65 CM2
AV VELOCITY RATIO: 62.35
BSA FOR ECHO PROCEDURE: 2.65 M2
CV ECHO LV RWT: 0.52 CM
DOP CALC AO PEAK VEL: 1.01 M/S
DOP CALC AO VTI: 20.85 CM
DOP CALC LVOT AREA: 3.2 CM2
DOP CALC LVOT DIAMETER: 2.02 CM
DOP CALC LVOT PEAK VEL: 62.97 M/S
DOP CALC LVOT STROKE VOLUME: 34.5 CM3
DOP CALCLVOT PEAK VEL VTI: 10.77 CM
E WAVE DECELERATION TIME: 377.09 MSEC
ECHO LV POSTERIOR WALL: 1.04 CM (ref 0.6–1.1)
FRACTIONAL SHORTENING: 30 % (ref 28–44)
INTERVENTRICULAR SEPTUM: 1.04 CM (ref 0.6–1.1)
LEFT ATRIUM SIZE: 4.5 CM
LEFT INTERNAL DIMENSION IN SYSTOLE: 2.8 CM (ref 2.1–4)
LEFT VENTRICLE MASS INDEX: 52 G/M2
LEFT VENTRICULAR INTERNAL DIMENSION IN DIASTOLE: 4 CM (ref 3.5–6)
LEFT VENTRICULAR MASS: 134.35 G
PISA TR MAX VEL: 2.51 M/S
PV PEAK VELOCITY: 53.08 CM/S
RA PRESSURE: 8 MMHG
TDI LATERAL: 0.11 M/S
TDI SEPTAL: 0.1 M/S
TDI: 0.11 M/S
TR MAX PG: 25 MMHG
TV REST PULMONARY ARTERY PRESSURE: 33 MMHG

## 2020-09-10 ENCOUNTER — TELEPHONE (OUTPATIENT)
Dept: PULMONOLOGY | Facility: CLINIC | Age: 85
End: 2020-09-10

## 2020-09-14 ENCOUNTER — OFFICE VISIT (OUTPATIENT)
Dept: PULMONOLOGY | Facility: CLINIC | Age: 85
End: 2020-09-14
Payer: MEDICARE

## 2020-09-14 VITALS
WEIGHT: 279 LBS | OXYGEN SATURATION: 91 % | HEART RATE: 72 BPM | SYSTOLIC BLOOD PRESSURE: 132 MMHG | TEMPERATURE: 97 F | BODY MASS INDEX: 33.96 KG/M2 | DIASTOLIC BLOOD PRESSURE: 70 MMHG

## 2020-09-14 DIAGNOSIS — J96.21 ACUTE ON CHRONIC RESPIRATORY FAILURE WITH HYPOXIA: ICD-10-CM

## 2020-09-14 DIAGNOSIS — E66.9 OBESITY WITH BODY MASS INDEX 30 OR GREATER: ICD-10-CM

## 2020-09-14 DIAGNOSIS — Z87.09 HISTORY OF PULMONARY FIBROSIS: ICD-10-CM

## 2020-09-14 DIAGNOSIS — J18.9 PNEUMONIA OF RIGHT LOWER LOBE DUE TO INFECTIOUS ORGANISM: ICD-10-CM

## 2020-09-14 PROCEDURE — 1111F DSCHRG MED/CURRENT MED MERGE: CPT | Mod: S$GLB,,, | Performed by: INTERNAL MEDICINE

## 2020-09-14 PROCEDURE — 1159F MED LIST DOCD IN RCRD: CPT | Mod: S$GLB,,, | Performed by: INTERNAL MEDICINE

## 2020-09-14 PROCEDURE — 99214 OFFICE O/P EST MOD 30 MIN: CPT | Mod: S$GLB,,, | Performed by: INTERNAL MEDICINE

## 2020-09-14 PROCEDURE — 1159F PR MEDICATION LIST DOCUMENTED IN MEDICAL RECORD: ICD-10-PCS | Mod: S$GLB,,, | Performed by: INTERNAL MEDICINE

## 2020-09-14 PROCEDURE — 1111F PR DISCHARGE MEDS RECONCILED W/ CURRENT OUTPATIENT MED LIST: ICD-10-PCS | Mod: S$GLB,,, | Performed by: INTERNAL MEDICINE

## 2020-09-14 PROCEDURE — 99214 PR OFFICE/OUTPT VISIT, EST, LEVL IV, 30-39 MIN: ICD-10-PCS | Mod: S$GLB,,, | Performed by: INTERNAL MEDICINE

## 2020-09-14 NOTE — PROGRESS NOTES
Office Visit Note *    Patient Name: Manuel Casas  MRN: [id number]  : 3/23/1933      Reason for visit: To transfer care, IPF/UIP, sleep apnea, chronic hypoxemia    HPI:     2020 - Pt with h/o IPF/UIP - diagnosed about 3-4 years ago initially on OFEV (had GI side effects), changed to ESBRIET and has no issues with it.  He has been stable (Dr Wade's last note has been reviewed PFT - - TLC -73%, DLCO 80%).   About 2 weeks ago he was sick with respiratory issues - was seen at Urgent Care and was treated with zithromax and feels better now.  He does feel that since stopping zithromax about 1 week ago he has increased SOB, no increased congestion but has had some increased cough.    2020 - Here for follow up after hospitalization for acinetobacter pneumonia and sepsis.  He is doing well and feels that he is doing better with high flow concentrator.  We discussed looking into a NIPPV.  He is interested in establishing with a cardiology group here (I have recommended Dr Stahl, et al).  He is working with PT at home and is progressing.  Patient has no known corona virus exposures and has been practicing social distancing.  We have discussed the virus and precautions and all questions have been answered.    NIPPV Order    Ordering noninvasive positive pressure volume ventilator for  PRN use to reduce the risk of exacerbation and possible hospitalization.  Home BiPAP in sufficient due to the severity of the condition.  Pulmonary fibrosis is the cause of the chronic respiratory failure/hypercapnea.    Home Oxygen  6 LPM    Face to face visit with pt regarding their home oxygen.  We have discussed the need for oxygen including continuous use, prn use or night time use (as appropriate for the pt).  We discussed the need for compliance with the therapy. We will do recertifications as necessary.       Past Medical History    Past Medical History:   Diagnosis Date    Atrial flutter     Cancer     prostate  "   Congestive heart failure     Diabetes mellitus, type 2     Heart failure     right sided    Hyperlipidemia     Pulmonary fibrosis     Sleep apnea        Past Surgical History    Past Surgical History:   Procedure Laterality Date    ADENOIDECTOMY      CARDIAC PACEMAKER PLACEMENT      HERNIA REPAIR      left, umbilical    TONSILLECTOMY         Medications      Current Outpatient Medications:     ACCU-CHEK SMARTVIEW TEST STRIP Strp, , Disp: , Rfl:     bicalutamide (CASODEX) 50 MG Tab, TK 1 T PO QD, Disp: , Rfl: 3    celecoxib (CELEBREX) 200 MG capsule, Take 200 mg by mouth., Disp: , Rfl:     clotrimazole-betamethasone 1-0.05% (LOTRISONE) cream, AIRAM EXT AA BID, Disp: , Rfl: 4    ELIQUIS 5 mg Tab, Take 1 tablet (5 mg total) by mouth 2 (two) times daily., Disp:  , Rfl:     ESBRIET 801 mg Tab, 3 (three) times daily. , Disp: , Rfl:     escitalopram oxalate (LEXAPRO) 10 MG tablet, 20 mg every morning. , Disp: , Rfl:     famotidine (PEPCID) 20 MG tablet, Take 20 mg by mouth 2 (two) times daily., Disp: , Rfl:     furosemide (LASIX) 40 MG tablet, 40 mg every other day. , Disp: , Rfl:     levothyroxine (SYNTHROID) 75 MCG tablet, TK 1 T PO  D ON EMPTY STOMACH 1 H APART FROM FOOD/MEDS, Disp: , Rfl: 3    montelukast (SINGULAIR) 10 mg tablet, Take 10 mg by mouth every evening., Disp: , Rfl:     NOVOLIN N NPH U-100 INSULIN 100 unit/mL injection, 45 Units 2 (two) times daily. , Disp: , Rfl: 5    potassium chloride (KLOR-CON) 10 MEQ TbSR, every other day. , Disp: , Rfl: 11    predniSONE (DELTASONE) 10 MG tablet, Take 2 a day x 5 days then 1 a day x 5 days, Disp: 30 tablet, Rfl: 0    pregabalin (LYRICA) 150 MG capsule, Take by mouth every evening. , Disp: , Rfl:     ULTRA COMFORT INSULIN SYRINGE 1 mL 29 gauge x 1/2" Syrg, , Disp: , Rfl:     VENTOLIN HFA 90 mcg/actuation inhaler, INL 1 TO 2 PFS PO Q 4 TO 6 H PRN, Disp: , Rfl: 0    Allergies    Review of patient's allergies indicates:  No Known " Allergies    SocHx    Social History     Tobacco Use   Smoking Status Former Smoker   about 30 pack years, quit about 50 years ago    Social History     Substance and Sexual Activity   Alcohol Use None       Drug Use - no  Occupation - retired urologist  Asbestos exposure - no  Pets - cat    FMHx    Family History   Problem Relation Age of Onset    Heart disease Mother     Diabetes Mother     Hypertension Mother          Review of Systems  Review of Systems   Constitutional: Negative for chills, diaphoresis, fever, malaise/fatigue and weight loss.   HENT: Negative for congestion.    Eyes: Negative for pain.   Respiratory: Positive for cough and shortness of breath. Negative for hemoptysis, sputum production, wheezing and stridor.    Cardiovascular: Negative for chest pain, palpitations, orthopnea, claudication, leg swelling and PND.   Gastrointestinal: Negative for abdominal pain, blood in stool, constipation, diarrhea, heartburn, nausea and vomiting.   Genitourinary: Negative for dysuria, frequency, hematuria and urgency.   Musculoskeletal: Negative for back pain, falls, joint pain, myalgias and neck pain.   Skin: Negative for itching and rash.   Neurological: Negative for dizziness, tingling, tremors, sensory change, speech change, focal weakness, seizures, loss of consciousness, weakness and headaches.   Endo/Heme/Allergies: Negative for environmental allergies.   Psychiatric/Behavioral: Negative for depression, substance abuse and suicidal ideas. The patient is not nervous/anxious.        Physical Exam    Vitals:    09/14/20 1032   BP: 132/70   BP Location: Left arm   Patient Position: Sitting   BP Method: Large (Manual)   Pulse: 72   Temp: 97.3 °F (36.3 °C)   TempSrc: Temporal   SpO2: (!) 91%   Weight: 126.6 kg (279 lb)       Physical Exam  Vitals signs and nursing note reviewed.   Constitutional:       General: He is not in acute distress.     Appearance: He is well-developed. He is obese. He is not  ill-appearing, toxic-appearing or diaphoretic.      Comments: Wearing O2 and mask   HENT:      Head: Normocephalic and atraumatic.      Right Ear: External ear normal.      Left Ear: External ear normal.      Nose: Nose normal.   Eyes:      General: No scleral icterus.        Right eye: No discharge.         Left eye: No discharge.      Extraocular Movements: Extraocular movements intact.      Conjunctiva/sclera: Conjunctivae normal.      Pupils: Pupils are equal, round, and reactive to light.   Neck:      Musculoskeletal: Normal range of motion and neck supple. No neck rigidity or muscular tenderness.      Thyroid: No thyromegaly.      Vascular: No carotid bruit or JVD.      Trachea: No tracheal deviation.   Cardiovascular:      Rate and Rhythm: Normal rate and regular rhythm.      Pulses: Normal pulses.      Heart sounds: Normal heart sounds. No murmur. No friction rub. No gallop.    Pulmonary:      Effort: Pulmonary effort is normal. No respiratory distress.      Breath sounds: No stridor. Rales present. No wheezing or rhonchi.   Chest:      Chest wall: No tenderness.   Abdominal:      General: Bowel sounds are normal. There is no distension.      Palpations: Abdomen is soft.      Tenderness: There is no abdominal tenderness. There is no guarding.      Comments: obese   Musculoskeletal: Normal range of motion.         General: No swelling, tenderness or deformity.      Right lower leg: Edema (trace) present.      Left lower leg: Edema (trace) present.   Lymphadenopathy:      Cervical: No cervical adenopathy.   Skin:     General: Skin is warm and dry.      Coloration: Skin is not jaundiced.      Findings: No bruising.   Neurological:      General: No focal deficit present.      Mental Status: He is alert and oriented to person, place, and time. Mental status is at baseline.      Cranial Nerves: No cranial nerve deficit.      Deep Tendon Reflexes: Reflexes are normal and symmetric.      Comments: In wheelchair    Psychiatric:         Mood and Affect: Mood normal.         Behavior: Behavior normal.         Thought Content: Thought content normal.         Judgment: Judgment normal.         Labs    Lab Results   Component Value Date    WBC 12.41 09/04/2020    HGB 15.8 09/04/2020    HCT 47.7 09/04/2020     (L) 09/04/2020       Sodium   Date Value Ref Range Status   09/04/2020 138 136 - 145 mmol/L Final     Potassium   Date Value Ref Range Status   09/04/2020 3.9 3.5 - 5.1 mmol/L Final     Chloride   Date Value Ref Range Status   09/04/2020 103 95 - 110 mmol/L Final     CO2   Date Value Ref Range Status   09/04/2020 22 (L) 23 - 29 mmol/L Final     Glucose   Date Value Ref Range Status   09/04/2020 120 (H) 70 - 110 mg/dL Final     BUN, Bld   Date Value Ref Range Status   09/04/2020 27 (H) 8 - 23 mg/dL Final     Creatinine   Date Value Ref Range Status   09/04/2020 1.0 0.5 - 1.4 mg/dL Final     Calcium   Date Value Ref Range Status   09/04/2020 8.8 8.7 - 10.5 mg/dL Final     Total Protein   Date Value Ref Range Status   09/04/2020 6.0 6.0 - 8.4 g/dL Final     Albumin   Date Value Ref Range Status   09/04/2020 3.6 3.5 - 5.2 g/dL Final     Total Bilirubin   Date Value Ref Range Status   09/04/2020 0.4 0.1 - 1.0 mg/dL Final     Comment:     For infants and newborns, interpretation of results should be based  on gestational age, weight and in agreement with clinical  observations.  Premature Infant recommended reference ranges:  Up to 24 hours.............<8.0 mg/dL  Up to 48 hours............<12.0 mg/dL  3-5 days..................<15.0 mg/dL  6-29 days.................<15.0 mg/dL       Alkaline Phosphatase   Date Value Ref Range Status   09/04/2020 60 55 - 135 U/L Final     AST   Date Value Ref Range Status   09/04/2020 19 10 - 40 U/L Final     ALT   Date Value Ref Range Status   09/04/2020 24 10 - 44 U/L Final     Anion Gap   Date Value Ref Range Status   09/04/2020 13 8 - 16 mmol/L Final        Xrays        Impression/Plan    Problem List Items Addressed This Visit        Pulmonary    Pneumonia     · Acinetobacter - resolved         Acute on chronic respiratory failure with hypoxia     · Continue present oxygen (high flow)  · Will look into NIPPV         History of pulmonary fibrosis     · Continue present meds  · Follow            Endocrine    Obesity with body mass index 30 or greater     · Continue to work on weight loss and exercise           RTC 1 month          Timo Ambrocio MD

## 2020-09-22 ENCOUNTER — TELEPHONE (OUTPATIENT)
Dept: PULMONOLOGY | Facility: CLINIC | Age: 85
End: 2020-09-22

## 2020-09-22 NOTE — TELEPHONE ENCOUNTER
Pt wife called stating they don't think they need the NIPPV they asked if you would agree on ordering the Bilevel CPAP machine?

## 2020-09-22 NOTE — TELEPHONE ENCOUNTER
Pt said they are going to hold off on everything. Only because they do not want to do additional studies nor pay $200 a month for the NIPPV.

## 2020-10-12 ENCOUNTER — TELEPHONE (OUTPATIENT)
Dept: CARDIOLOGY | Facility: CLINIC | Age: 85
End: 2020-10-12

## 2020-10-12 ENCOUNTER — OFFICE VISIT (OUTPATIENT)
Dept: CARDIOLOGY | Facility: CLINIC | Age: 85
End: 2020-10-12
Payer: MEDICARE

## 2020-10-12 VITALS
RESPIRATION RATE: 16 BRPM | HEART RATE: 72 BPM | OXYGEN SATURATION: 93 % | SYSTOLIC BLOOD PRESSURE: 132 MMHG | HEIGHT: 76 IN | DIASTOLIC BLOOD PRESSURE: 70 MMHG | WEIGHT: 281 LBS | BODY MASS INDEX: 34.22 KG/M2

## 2020-10-12 DIAGNOSIS — Z95.0 PRESENCE OF CARDIAC PACEMAKER: ICD-10-CM

## 2020-10-12 DIAGNOSIS — I50.9 CONGESTIVE HEART FAILURE, UNSPECIFIED HF CHRONICITY, UNSPECIFIED HEART FAILURE TYPE: ICD-10-CM

## 2020-10-12 DIAGNOSIS — I48.92 ATRIAL FLUTTER, UNSPECIFIED TYPE: Primary | ICD-10-CM

## 2020-10-12 DIAGNOSIS — Z87.09 HISTORY OF PULMONARY FIBROSIS: ICD-10-CM

## 2020-10-12 DIAGNOSIS — E11.9 TYPE 2 DIABETES MELLITUS WITHOUT COMPLICATION, WITHOUT LONG-TERM CURRENT USE OF INSULIN: ICD-10-CM

## 2020-10-12 DIAGNOSIS — R09.02 HYPOXEMIA: ICD-10-CM

## 2020-10-12 DIAGNOSIS — D69.6 THROMBOCYTOPENIA: ICD-10-CM

## 2020-10-12 DIAGNOSIS — J96.21 ACUTE ON CHRONIC RESPIRATORY FAILURE WITH HYPOXIA: ICD-10-CM

## 2020-10-12 DIAGNOSIS — E03.9 HYPOTHYROIDISM, UNSPECIFIED TYPE: ICD-10-CM

## 2020-10-12 DIAGNOSIS — R07.89 CHEST PRESSURE: ICD-10-CM

## 2020-10-12 DIAGNOSIS — I49.5 SSS (SICK SINUS SYNDROME): Primary | ICD-10-CM

## 2020-10-12 PROCEDURE — 1159F MED LIST DOCD IN RCRD: CPT | Mod: S$GLB,,, | Performed by: INTERNAL MEDICINE

## 2020-10-12 PROCEDURE — 93000 EKG 12-LEAD: ICD-10-PCS | Mod: S$GLB,,, | Performed by: INTERNAL MEDICINE

## 2020-10-12 PROCEDURE — 1159F PR MEDICATION LIST DOCUMENTED IN MEDICAL RECORD: ICD-10-PCS | Mod: S$GLB,,, | Performed by: INTERNAL MEDICINE

## 2020-10-12 PROCEDURE — 3288F FALL RISK ASSESSMENT DOCD: CPT | Mod: CPTII,S$GLB,, | Performed by: INTERNAL MEDICINE

## 2020-10-12 PROCEDURE — 1126F PR PAIN SEVERITY QUANTIFIED, NO PAIN PRESENT: ICD-10-PCS | Mod: S$GLB,,, | Performed by: INTERNAL MEDICINE

## 2020-10-12 PROCEDURE — 1101F PT FALLS ASSESS-DOCD LE1/YR: CPT | Mod: CPTII,S$GLB,, | Performed by: INTERNAL MEDICINE

## 2020-10-12 PROCEDURE — 93000 ELECTROCARDIOGRAM COMPLETE: CPT | Mod: S$GLB,,, | Performed by: INTERNAL MEDICINE

## 2020-10-12 PROCEDURE — 99214 OFFICE O/P EST MOD 30 MIN: CPT | Mod: 25,S$GLB,, | Performed by: INTERNAL MEDICINE

## 2020-10-12 PROCEDURE — 99214 PR OFFICE/OUTPT VISIT, EST, LEVL IV, 30-39 MIN: ICD-10-PCS | Mod: 25,S$GLB,, | Performed by: INTERNAL MEDICINE

## 2020-10-12 PROCEDURE — 1101F PR PT FALLS ASSESS DOC 0-1 FALLS W/OUT INJ PAST YR: ICD-10-PCS | Mod: CPTII,S$GLB,, | Performed by: INTERNAL MEDICINE

## 2020-10-12 PROCEDURE — 3288F PR FALLS RISK ASSESSMENT DOCUMENTED: ICD-10-PCS | Mod: CPTII,S$GLB,, | Performed by: INTERNAL MEDICINE

## 2020-10-12 PROCEDURE — 1126F AMNT PAIN NOTED NONE PRSNT: CPT | Mod: S$GLB,,, | Performed by: INTERNAL MEDICINE

## 2020-10-12 RX ORDER — NAPROXEN 500 MG/1
500 TABLET ORAL 2 TIMES DAILY WITH MEALS
COMMUNITY
End: 2021-08-09

## 2020-10-12 RX ORDER — DICYCLOMINE HYDROCHLORIDE 10 MG/1
10 CAPSULE ORAL 3 TIMES DAILY
COMMUNITY
Start: 2020-10-01 | End: 2021-08-10

## 2020-10-12 RX ORDER — METOCLOPRAMIDE 10 MG/1
10 TABLET ORAL EVERY 6 HOURS PRN
COMMUNITY
End: 2021-08-10

## 2020-10-12 RX ORDER — BICALUTAMIDE 50 MG/1
50 TABLET, FILM COATED ORAL DAILY
COMMUNITY

## 2020-10-14 ENCOUNTER — OFFICE VISIT (OUTPATIENT)
Dept: PULMONOLOGY | Facility: CLINIC | Age: 85
End: 2020-10-14
Payer: MEDICARE

## 2020-10-14 DIAGNOSIS — J84.10 PULMONARY FIBROSIS: ICD-10-CM

## 2020-10-14 DIAGNOSIS — J18.9 PNEUMONIA OF RIGHT LOWER LOBE DUE TO INFECTIOUS ORGANISM: ICD-10-CM

## 2020-10-14 DIAGNOSIS — E66.9 OBESITY WITH BODY MASS INDEX 30 OR GREATER: ICD-10-CM

## 2020-10-14 DIAGNOSIS — J96.21 ACUTE ON CHRONIC RESPIRATORY FAILURE WITH HYPOXIA: ICD-10-CM

## 2020-10-14 PROCEDURE — 99214 OFFICE O/P EST MOD 30 MIN: CPT | Mod: 25,S$GLB,, | Performed by: INTERNAL MEDICINE

## 2020-10-14 PROCEDURE — 90662 IIV NO PRSV INCREASED AG IM: CPT | Mod: S$GLB,,, | Performed by: INTERNAL MEDICINE

## 2020-10-14 PROCEDURE — 1101F PT FALLS ASSESS-DOCD LE1/YR: CPT | Mod: S$GLB,,, | Performed by: INTERNAL MEDICINE

## 2020-10-14 PROCEDURE — 1159F MED LIST DOCD IN RCRD: CPT | Mod: S$GLB,,, | Performed by: INTERNAL MEDICINE

## 2020-10-14 PROCEDURE — 1101F PR PT FALLS ASSESS DOC 0-1 FALLS W/OUT INJ PAST YR: ICD-10-PCS | Mod: S$GLB,,, | Performed by: INTERNAL MEDICINE

## 2020-10-14 PROCEDURE — 90662 FLU VACCINE - QUADRIVALENT - HIGH DOSE (65+) PRESERVATIVE FREE IM: ICD-10-PCS | Mod: S$GLB,,, | Performed by: INTERNAL MEDICINE

## 2020-10-14 PROCEDURE — G0008 ADMIN INFLUENZA VIRUS VAC: HCPCS | Mod: S$GLB,,, | Performed by: INTERNAL MEDICINE

## 2020-10-14 PROCEDURE — 1159F PR MEDICATION LIST DOCUMENTED IN MEDICAL RECORD: ICD-10-PCS | Mod: S$GLB,,, | Performed by: INTERNAL MEDICINE

## 2020-10-14 PROCEDURE — 99214 PR OFFICE/OUTPT VISIT, EST, LEVL IV, 30-39 MIN: ICD-10-PCS | Mod: 25,S$GLB,, | Performed by: INTERNAL MEDICINE

## 2020-10-14 PROCEDURE — G0008 FLU VACCINE - QUADRIVALENT - HIGH DOSE (65+) PRESERVATIVE FREE IM: ICD-10-PCS | Mod: S$GLB,,, | Performed by: INTERNAL MEDICINE

## 2020-10-14 NOTE — PROGRESS NOTES
Office Visit Note *    Patient Name: Manuel Casas  MRN: [id number]  : 3/23/1933      Reason for visit: To transfer care, IPF/UIP, sleep apnea, chronic hypoxemia    HPI:     2020 - Pt with h/o IPF/UIP - diagnosed about 3-4 years ago initially on OFEV (had GI side effects), changed to ESBRIET and has no issues with it.  He has been stable (Dr Wade's last note has been reviewed PFT - - TLC -73%, DLCO 80%).   About 2 weeks ago he was sick with respiratory issues - was seen at Urgent Care and was treated with zithromax and feels better now.  He does feel that since stopping zithromax about 1 week ago he has increased SOB, no increased congestion but has had some increased cough.    2020 - Here for follow up after hospitalization for acinetobacter pneumonia and sepsis.  He is doing well and feels that he is doing better with high flow concentrator.  We discussed looking into a NIPPV.  He is interested in establishing with a cardiology group here (I have recommended Dr Stahl, et al).  He is working with PT at home and is progressing.  Patient has no known corona virus exposures and has been practicing social distancing.  We have discussed the virus and precautions and all questions have been answered.    10/14/2020 - Here for follow up, breathing is back at baseline and no new issues.  He reports that he had a syncopal episode after a prolonged hot shower (we discussed this).  He has seen Dr Stahl for cardiology and is to get pacer checked and may need a battery changed soon.  He is working on increasing activity.  Will give flu shot.  There were financial issues with NIPPV so he is using CPAP.    NIPPV Order    Ordering noninvasive positive pressure volume ventilator for  PRN use to reduce the risk of exacerbation and possible hospitalization.  Home BiPAP in sufficient due to the severity of the condition.  Pulmonary fibrosis is the cause of the chronic respiratory failure/hypercapnea.    Home  Oxygen  6 LPM    Face to face visit with pt regarding their home oxygen.  We have discussed the need for oxygen including continuous use, prn use or night time use (as appropriate for the pt).  We discussed the need for compliance with the therapy. We will do recertifications as necessary.       Past Medical History    Past Medical History:   Diagnosis Date    Atrial flutter     Cancer     prostate    Congestive heart failure     Diabetes mellitus, type 2     Heart failure     right sided    Hyperlipidemia     Pulmonary fibrosis     Sleep apnea        Past Surgical History    Past Surgical History:   Procedure Laterality Date    ADENOIDECTOMY      CARDIAC PACEMAKER PLACEMENT      HERNIA REPAIR      left, umbilical    TONSILLECTOMY         Medications      Current Outpatient Medications:     ACCU-CHEK SMARTVIEW TEST STRIP Strp, , Disp: , Rfl:     bicalutamide (CASODEX) 50 MG Tab, TK 1 T PO QD, Disp: , Rfl: 3    bicalutamide (CASODEX) 50 MG Tab, Take 50 mg by mouth once daily., Disp: , Rfl:     celecoxib (CELEBREX) 200 MG capsule, Take 200 mg by mouth., Disp: , Rfl:     clotrimazole-betamethasone 1-0.05% (LOTRISONE) cream, AIRAM EXT AA BID, Disp: , Rfl: 4    dicyclomine (BENTYL) 10 MG capsule, Take 10 mg by mouth 3 (three) times daily., Disp: , Rfl:     ELIQUIS 5 mg Tab, Take 1 tablet (5 mg total) by mouth 2 (two) times daily., Disp:  , Rfl:     ESBRIET 801 mg Tab, 3 (three) times daily. , Disp: , Rfl:     escitalopram oxalate (LEXAPRO) 10 MG tablet, 20 mg every morning. , Disp: , Rfl:     famotidine (PEPCID) 20 MG tablet, Take 20 mg by mouth 2 (two) times daily., Disp: , Rfl:     furosemide (LASIX) 40 MG tablet, 40 mg every other day. , Disp: , Rfl:     levothyroxine (SYNTHROID) 75 MCG tablet, TK 1 T PO  D ON EMPTY STOMACH 1 H APART FROM FOOD/MEDS, Disp: , Rfl: 3    metoclopramide HCl (REGLAN) 10 MG tablet, Take 10 mg by mouth every 6 (six) hours as needed., Disp: , Rfl:     montelukast  "(SINGULAIR) 10 mg tablet, Take 10 mg by mouth every evening., Disp: , Rfl:     naproxen (NAPROSYN) 500 MG tablet, Take 500 mg by mouth 2 (two) times daily with meals., Disp: , Rfl:     NOVOLIN N NPH U-100 INSULIN 100 unit/mL injection, 45 Units 2 (two) times daily. , Disp: , Rfl: 5    potassium chloride (KLOR-CON) 10 MEQ TbSR, every other day. , Disp: , Rfl: 11    predniSONE (DELTASONE) 10 MG tablet, Take 2 a day x 5 days then 1 a day x 5 days, Disp: 30 tablet, Rfl: 0    pregabalin (LYRICA) 150 MG capsule, Take by mouth every evening. , Disp: , Rfl:     ULTRA COMFORT INSULIN SYRINGE 1 mL 29 gauge x 1/2" Syrg, , Disp: , Rfl:     VENTOLIN HFA 90 mcg/actuation inhaler, INL 1 TO 2 PFS PO Q 4 TO 6 H PRN, Disp: , Rfl: 0    Allergies    Review of patient's allergies indicates:  No Known Allergies    SocHx    Social History     Tobacco Use   Smoking Status Former Smoker   about 30 pack years, quit about 50 years ago    Social History     Substance and Sexual Activity   Alcohol Use None       Drug Use - no  Occupation - retired urologist  Asbestos exposure - no  Pets - cat    FMHx    Family History   Problem Relation Age of Onset    Heart disease Mother     Diabetes Mother     Hypertension Mother          Review of Systems  Review of Systems   Constitutional: Negative for chills, diaphoresis, fever, malaise/fatigue and weight loss.   HENT: Negative for congestion.    Eyes: Negative for pain.   Respiratory: Positive for cough and shortness of breath. Negative for hemoptysis, sputum production, wheezing and stridor.    Cardiovascular: Negative for chest pain, palpitations, orthopnea, claudication, leg swelling and PND.   Gastrointestinal: Negative for abdominal pain, blood in stool, constipation, diarrhea, heartburn, nausea and vomiting.   Genitourinary: Negative for dysuria, frequency, hematuria and urgency.   Musculoskeletal: Negative for back pain, falls, joint pain, myalgias and neck pain.   Skin: Negative for " itching and rash.   Neurological: Negative for dizziness, tingling, tremors, sensory change, speech change, focal weakness, seizures, loss of consciousness, weakness and headaches.   Endo/Heme/Allergies: Negative for environmental allergies.   Psychiatric/Behavioral: Negative for depression, substance abuse and suicidal ideas. The patient is not nervous/anxious.        Physical Exam    Vitals:    10/14/20 1110   BP: (P) 136/82   BP Location: (P) Left arm   Patient Position: (P) Sitting   BP Method: (P) Large (Manual)   Pulse: (P) 69   Temp: (P) 97.2 °F (36.2 °C)   TempSrc: (P) Temporal   SpO2: (P) 95%   Weight: (P) 127.5 kg (281 lb)       Physical Exam  Vitals signs and nursing note reviewed.   Constitutional:       General: He is not in acute distress.     Appearance: He is well-developed. He is obese. He is not ill-appearing, toxic-appearing or diaphoretic.      Comments: Wearing O2 and mask   HENT:      Head: Normocephalic and atraumatic.      Right Ear: External ear normal.      Left Ear: External ear normal.      Nose: Nose normal.   Eyes:      General: No scleral icterus.        Right eye: No discharge.         Left eye: No discharge.      Extraocular Movements: Extraocular movements intact.      Conjunctiva/sclera: Conjunctivae normal.      Pupils: Pupils are equal, round, and reactive to light.   Neck:      Musculoskeletal: Normal range of motion and neck supple. No neck rigidity or muscular tenderness.      Thyroid: No thyromegaly.      Vascular: No carotid bruit or JVD.      Trachea: No tracheal deviation.   Cardiovascular:      Rate and Rhythm: Normal rate and regular rhythm.      Pulses: Normal pulses.      Heart sounds: Normal heart sounds. No murmur. No friction rub. No gallop.    Pulmonary:      Effort: Pulmonary effort is normal. No respiratory distress.      Breath sounds: No stridor. Rales present. No wheezing or rhonchi.   Chest:      Chest wall: No tenderness.   Abdominal:      General: Bowel  sounds are normal. There is no distension.      Palpations: Abdomen is soft.      Tenderness: There is no abdominal tenderness. There is no guarding.      Comments: obese   Musculoskeletal: Normal range of motion.         General: No swelling, tenderness or deformity.      Right lower leg: Edema (trace) present.      Left lower leg: Edema (trace) present.   Lymphadenopathy:      Cervical: No cervical adenopathy.   Skin:     General: Skin is warm and dry.      Coloration: Skin is not jaundiced.      Findings: No bruising.   Neurological:      General: No focal deficit present.      Mental Status: He is alert and oriented to person, place, and time. Mental status is at baseline.      Cranial Nerves: No cranial nerve deficit.      Deep Tendon Reflexes: Reflexes are normal and symmetric.      Comments: In wheelchair   Psychiatric:         Mood and Affect: Mood normal.         Behavior: Behavior normal.         Thought Content: Thought content normal.         Judgment: Judgment normal.         Labs    Lab Results   Component Value Date    WBC 12.41 09/04/2020    HGB 15.8 09/04/2020    HCT 47.7 09/04/2020     (L) 09/04/2020       Sodium   Date Value Ref Range Status   09/04/2020 138 136 - 145 mmol/L Final     Potassium   Date Value Ref Range Status   09/04/2020 3.9 3.5 - 5.1 mmol/L Final     Chloride   Date Value Ref Range Status   09/04/2020 103 95 - 110 mmol/L Final     CO2   Date Value Ref Range Status   09/04/2020 22 (L) 23 - 29 mmol/L Final     Glucose   Date Value Ref Range Status   09/04/2020 120 (H) 70 - 110 mg/dL Final     BUN, Bld   Date Value Ref Range Status   09/04/2020 27 (H) 8 - 23 mg/dL Final     Creatinine   Date Value Ref Range Status   09/04/2020 1.0 0.5 - 1.4 mg/dL Final     Calcium   Date Value Ref Range Status   09/04/2020 8.8 8.7 - 10.5 mg/dL Final     Total Protein   Date Value Ref Range Status   09/04/2020 6.0 6.0 - 8.4 g/dL Final     Albumin   Date Value Ref Range Status   09/04/2020 3.6  3.5 - 5.2 g/dL Final     Total Bilirubin   Date Value Ref Range Status   09/04/2020 0.4 0.1 - 1.0 mg/dL Final     Comment:     For infants and newborns, interpretation of results should be based  on gestational age, weight and in agreement with clinical  observations.  Premature Infant recommended reference ranges:  Up to 24 hours.............<8.0 mg/dL  Up to 48 hours............<12.0 mg/dL  3-5 days..................<15.0 mg/dL  6-29 days.................<15.0 mg/dL       Alkaline Phosphatase   Date Value Ref Range Status   09/04/2020 60 55 - 135 U/L Final     AST   Date Value Ref Range Status   09/04/2020 19 10 - 40 U/L Final     ALT   Date Value Ref Range Status   09/04/2020 24 10 - 44 U/L Final     Anion Gap   Date Value Ref Range Status   09/04/2020 13 8 - 16 mmol/L Final       Xrays        Impression/Plan    Problem List Items Addressed This Visit        Pulmonary    Pulmonary fibrosis     · Continue present meds and follow         Pneumonia     · Resolved          Acute on chronic respiratory failure with hypoxia     · Stable, continue the same            Endocrine    Obesity with body mass index 30 or greater     · Needs to work on diet and exercise                     Timo Ambrocio MD

## 2020-10-15 ENCOUNTER — HOSPITAL ENCOUNTER (OUTPATIENT)
Dept: CARDIOLOGY | Facility: CLINIC | Age: 85
Discharge: HOME | End: 2020-10-15
Attending: INTERNAL MEDICINE
Payer: MEDICARE

## 2020-10-15 DIAGNOSIS — I49.5 SSS (SICK SINUS SYNDROME): ICD-10-CM

## 2020-10-15 PROCEDURE — 93280 CARDIAC DEVICE CHECK - IN CLINIC & HOSPITAL: ICD-10-PCS | Mod: S$GLB,,, | Performed by: INTERNAL MEDICINE

## 2020-10-15 PROCEDURE — 93280 PM DEVICE PROGR EVAL DUAL: CPT | Mod: S$GLB,,, | Performed by: INTERNAL MEDICINE

## 2020-10-19 DIAGNOSIS — I48.92 ATRIAL FLUTTER, UNSPECIFIED TYPE: Primary | ICD-10-CM

## 2020-10-19 DIAGNOSIS — I49.5 SSS (SICK SINUS SYNDROME): ICD-10-CM

## 2020-10-20 ENCOUNTER — HOSPITAL ENCOUNTER (OUTPATIENT)
Dept: CARDIOLOGY | Facility: CLINIC | Age: 85
Discharge: HOME | End: 2020-10-20
Attending: INTERNAL MEDICINE
Payer: MEDICARE

## 2020-10-20 DIAGNOSIS — I49.5 SSS (SICK SINUS SYNDROME): ICD-10-CM

## 2020-12-01 LAB
BATTERY VOLTAGE (V): 20 V
IMPEDANCE RA LEAD (NATIVE): 507 OHMS
IMPEDANCE RA LEAD: 370 OHMS
THRESHOLD MS RA LEAD: 0.4 MS
THRESHOLD V RA LEAD: 0.9 V

## 2021-01-06 ENCOUNTER — OFFICE VISIT (OUTPATIENT)
Dept: PULMONOLOGY | Facility: CLINIC | Age: 86
End: 2021-01-06
Payer: MEDICARE

## 2021-01-06 DIAGNOSIS — Z87.09 HISTORY OF PULMONARY FIBROSIS: ICD-10-CM

## 2021-01-06 DIAGNOSIS — E66.9 OBESITY WITH BODY MASS INDEX 30 OR GREATER: ICD-10-CM

## 2021-01-06 DIAGNOSIS — J96.21 ACUTE ON CHRONIC RESPIRATORY FAILURE WITH HYPOXIA: ICD-10-CM

## 2021-01-06 DIAGNOSIS — R09.02 HYPOXEMIA: ICD-10-CM

## 2021-01-06 PROBLEM — J18.9 PNEUMONIA: Status: RESOLVED | Noted: 2020-08-27 | Resolved: 2021-01-06

## 2021-01-06 PROBLEM — J84.10 PULMONARY FIBROSIS: Status: RESOLVED | Noted: 2020-08-24 | Resolved: 2021-01-06

## 2021-01-06 PROCEDURE — 99214 OFFICE O/P EST MOD 30 MIN: CPT | Mod: S$GLB,,, | Performed by: INTERNAL MEDICINE

## 2021-01-06 PROCEDURE — 1159F PR MEDICATION LIST DOCUMENTED IN MEDICAL RECORD: ICD-10-PCS | Mod: S$GLB,,, | Performed by: INTERNAL MEDICINE

## 2021-01-06 PROCEDURE — 1159F MED LIST DOCD IN RCRD: CPT | Mod: S$GLB,,, | Performed by: INTERNAL MEDICINE

## 2021-01-06 PROCEDURE — 99214 PR OFFICE/OUTPT VISIT, EST, LEVL IV, 30-39 MIN: ICD-10-PCS | Mod: S$GLB,,, | Performed by: INTERNAL MEDICINE

## 2021-01-15 ENCOUNTER — IMMUNIZATION (OUTPATIENT)
Dept: FAMILY MEDICINE | Facility: CLINIC | Age: 86
End: 2021-01-15
Payer: MEDICARE

## 2021-01-15 DIAGNOSIS — Z23 NEED FOR VACCINATION: Primary | ICD-10-CM

## 2021-01-15 PROCEDURE — 91300 COVID-19, MRNA, LNP-S, PF, 30 MCG/0.3 ML DOSE VACCINE: CPT | Mod: PBBFAC | Performed by: NURSE PRACTITIONER

## 2021-01-22 NOTE — PROGRESS NOTES
"Pulmonary/Critical Care Progress Note      Patient name: Manuel Casas  MRN: [id number]  Date: 09/02/2020    Admit Date: 8/26/2020  Consult Requested By: Kael Keith MD    Reason for Consult: REspiratory failure, pneumonia, pulm fibrosis    HPI:    8/27/2020 - 86 yo male who I met 2 days ago has h/o pulmonary fibrosis (fairly mild but progressive, on ESBRIET, home O2) had recent respiratory illness treated at Urgent Care and when I saw him he was stablle but with some increased dyspnea and I placed him on a short prednisone taper.  Yesterday during the day he felt well, SOB was better and he was active.  That evening he had an episode of chills and rigors and coughed up some bloody sputum.  He then began to develop fever and came to ER.  Covid test was negative and xrays show a RLL infiltrate c/w CAP.  He has needed BiPAP with 100% O2 and is now admitted for further treatment.  He states that he would prefer to not be intubated unless "absolutely necessary".  ROS as below.  He has not had any corona virus exposures and has been practicing social distancing.    8/31/2020 - Events of the weekend noted, no new issues reported and has been transferred to floor.  Doing better but still with high FiO2 requirements with both vapotherm and BiPAP.  He states that he feels better overall.  CXR shows some clearing at right lung base.    9/1/2020 - Stable overnight, pt sleeping with BiPAP when I saw him (I didn't waken pt).  No new issues reported.  He is still requiring significant O2 (70% vapotherm) and cannot be DC home yet.  Cumulative I/O are negative about 3.3 liters    9/2/2020 - Stable overnight and no new complaints, O2 decreased to 65%.  CXR today looks better.  He is working with therapy and has been out of bed.    Review of Systems    Review of Systems   Constitutional: Positive for chills, fever and malaise/fatigue. Negative for diaphoresis and weight loss.   HENT: Negative for congestion, nosebleeds and " sinus pain.    Eyes: Negative for pain.   Respiratory: Positive for cough, hemoptysis, sputum production and shortness of breath. Negative for wheezing and stridor.    Cardiovascular: Positive for leg swelling (chronic and not worse). Negative for chest pain, palpitations, orthopnea, claudication and PND.   Gastrointestinal: Positive for diarrhea. Negative for abdominal pain, blood in stool, constipation, heartburn, nausea and vomiting.   Genitourinary: Negative for dysuria, frequency, hematuria and urgency.   Musculoskeletal: Negative for back pain, falls, joint pain, myalgias and neck pain.   Skin: Negative for itching and rash.   Neurological: Positive for weakness (some generalized). Negative for dizziness, tingling, tremors, sensory change, speech change, focal weakness, seizures, loss of consciousness and headaches.   Psychiatric/Behavioral: Negative for depression, substance abuse and suicidal ideas. The patient is not nervous/anxious.        Past Medical History    Past Medical History:   Diagnosis Date    Atrial flutter     Cancer     prostate    Congestive heart failure     Diabetes mellitus, type 2     Heart failure     right sided    Hyperlipidemia     Pulmonary fibrosis     Sleep apnea        Past Surgical History    Past Surgical History:   Procedure Laterality Date    ADENOIDECTOMY      CARDIAC PACEMAKER PLACEMENT      HERNIA REPAIR      left, umbilical    TONSILLECTOMY         Medications (scheduled):      apixaban  2.5 mg Oral BID    bicalutamide  50 mg Oral Daily    cefTRIAXone (ROCEPHIN) IVPB  2 g Intravenous Q12H    clotrimazole-betamethasone 1-0.05%   Topical (Top) BID    escitalopram oxalate  10 mg Oral QHS    famotidine  20 mg Oral BID    insulin NPH  35 Units Subcutaneous BID    levothyroxine  75 mcg Oral Before breakfast    montelukast  10 mg Oral QHS    pirfenidone  801 mg Oral TID    predniSONE  30 mg Oral Daily    pregabalin  150 mg Oral QHS       Medications  "(infusions):         Medications (prn):     acetaminophen, acetaminophen, albuterol, bisacodyL, dextrose 50%, dextrose 50%, diphenhydrAMINE, glucagon (human recombinant), glucose, glucose, insulin aspart U-100, magnesium oxide, magnesium oxide, ondansetron, potassium, sodium phosphates, potassium, sodium phosphates, potassium, sodium phosphates, promethazine (PHENERGAN) IVPB, sodium chloride 0.9%    Family History:   Family History   Problem Relation Age of Onset    Heart disease Mother     Diabetes Mother     Hypertension Mother        Social History: Tobacco:   Social History     Tobacco Use   Smoking Status Former Smoker                                EtOH:   Social History     Substance and Sexual Activity   Alcohol Use None                                Drugs:   Social History     Substance and Sexual Activity   Drug Use Not on file                                Occupation: retired urologist                             Asbestos exposure: no    Physical Exam    Vital signs:  Temp:  [97.9 °F (36.6 °C)-98.2 °F (36.8 °C)]   Pulse:  [69-71]   Resp:  [17-23]   BP: (106-128)/(56-68)   SpO2:  [91 %-94 %]     Intake/Output:     Intake/Output Summary (Last 24 hours) at 9/2/2020 1234  Last data filed at 9/2/2020 0900  Gross per 24 hour   Intake 360 ml   Output 1600 ml   Net -1240 ml        BMI: Estimated body mass index is 35.21 kg/m² as calculated from the following:    Height as of this encounter: 6' 4" (1.93 m).    Weight as of this encounter: 131.2 kg (289 lb 3.9 oz).    Physical Exam   Constitutional: He is oriented to person, place, and time. No distress.   Obese male, wearing BiPAP   HENT:   Head: Normocephalic and atraumatic.   Right Ear: External ear normal.   Left Ear: External ear normal.   Mouth/Throat: Oropharynx is clear and moist.   Wearing BiPAP   Eyes: Pupils are equal, round, and reactive to light. Conjunctivae are normal. Right eye exhibits no discharge. Left eye exhibits no discharge. No scleral " icterus.   Neck: Normal range of motion. Neck supple. No JVD present. No tracheal deviation present. No thyromegaly present.   Cardiovascular: Normal rate, regular rhythm, normal heart sounds and intact distal pulses. Exam reveals no gallop and no friction rub.   No murmur heard.  Pulmonary/Chest: Effort normal. No stridor. No respiratory distress. He has no wheezes. He has rales (few posterior rales).   BiPAP  + bronchial BS RLL   Abdominal: Soft. Bowel sounds are normal. He exhibits no distension. There is no abdominal tenderness. There is no rebound.   obese   Musculoskeletal: Normal range of motion.         General: Edema present. No tenderness.   Lymphadenopathy:     He has no cervical adenopathy.   Neurological: He is alert and oriented to person, place, and time. GCS score is 15.   Some generalized weakness   Skin: Skin is warm and dry. No rash noted. He is not diaphoretic. No erythema.   Psychiatric: Mood, memory, affect and judgment normal.   Nursing note and vitals reviewed.      Laboratory    Recent Labs   Lab 09/02/20  0644   WBC 11.67   RBC 4.58*   HGB 15.3   HCT 45.9   *   *   MCH 33.4*   MCHC 33.3       Recent Labs   Lab 09/02/20  0644   CALCIUM 9.1   PROT 5.8*      K 4.0   CO2 24      BUN 32*   CREATININE 1.1   ALKPHOS 54*   ALT 23   AST 18   BILITOT 0.4       No results for input(s): PT, INR, APTT in the last 24 hours.    No results for input(s): CPK, CPKMB, TROPONINI, MB in the last 24 hours.    Additional labs:     LA - 8 -- 4.8    Procalcitonin - 0.05    Covid - neg    UA - noted    Microbiology:       Microbiology Results (last 7 days)     Procedure Component Value Units Date/Time    Blood culture [[id number]] Collected: 08/29/20 0316    Order Status: Completed Specimen: Blood Updated: 09/02/20 0432     Blood Culture, Routine No Growth to date      No Growth to date      No Growth to date      No Growth to date      No Growth to date    Blood culture [[id number]]  Collected: 08/28/20 0413    Order Status: Completed Specimen: Blood Updated: 09/02/20 0432     Blood Culture, Routine No growth after 5 days.    Blood culture x two cultures. Draw prior to antibiotics. [[id number]]  (Abnormal) Collected: 08/27/20 0030    Order Status: Completed Specimen: Blood from Peripheral, Forearm, Left Updated: 08/30/20 0728     Blood Culture, Routine Gram stain aer bottle: Gram negative rods      Results called to and read back by: Oriana Paz RN in MICU by GARIMA      08/27/2020  11:27      [facility]  For susceptibility see order #[id number]      Narrative:      Aerobic and anaerobic    Blood culture x two cultures. Draw prior to antibiotics. [[id number]]  (Abnormal)  (Susceptibility) Collected: 08/27/20 0026    Order Status: Completed Specimen: Blood from Peripheral, Forearm, Right Updated: 08/30/20 0728     Blood Culture, Routine Gram stain aer bottle: Gram negative rods      Results called to and read back by: Oriana Paz RN in MICU by GARIMA      08/27/2020  11:27      [facility]    Narrative:      Aerobic and anaerobic    Clostridium difficile EIA [[id number]] Collected: 08/28/20 2332    Order Status: Canceled Specimen: Stool           Radiology        Additional Studies    EKG (8/27)  Vent. Rate : 070 BPM     Atrial Rate : 119 BPM      P-R Int : 000 ms          QRS Dur : 124 ms       QT Int : 402 ms       P-R-T Axes : 000 -88 083 degrees      QTc Int : 434 ms     Ventricular-paced rhythm   Abnormal ECG   No previous ECGs available    Ventilator Information    Oxygen Concentration (%):  [60-65] 65         No results for input(s): PH, PCO2, PO2, HCO3, POCSATURATED, BE in the last 72 hours.      Impression    Active Hospital Problems    Diagnosis  POA    *Pneumonia [J18.9]  Yes    Respiratory failure with hypoxia [J96.91]  Yes    History of pulmonary fibrosis [Z87.09]  Not Applicable    Gram-negative bacteremia [R78.81]  Yes    Hypothyroidism [E03.9]  Yes     Diabetes mellitus [E11.9]  Yes    Acute on chronic respiratory failure with hypoxia [J96.21]  Yes    Thrombocytopenia [D69.6]  Yes    Atrial flutter [I48.92]  Yes    Presence of cardiac pacemaker [Z95.0]  Yes    Pulmonary fibrosis [J84.10]  Yes     · UIP on ESBRIET since about 2015  · PFT (11/19) - TLC - 74%, DLCO - 80%      Obesity with body mass index 30 or greater [E66.9]  Yes      Resolved Hospital Problems    Diagnosis Date Resolved POA    Hemoptysis [R04.2] 08/30/2020 Yes    Sepsis [A41.9] 08/30/2020 Yes    Sleep apnea [G47.30] 08/28/2020 Yes       Plan    · Continue antibiotics - would prefer 7 days total IV  · CXR today looks better  · BiPAP as needed - wean O2 as able, vapotherm as tolerated - wean O 2 as able, seems to be getting better but not ready to go home  · + acinetobacter bacteremia  · Continue oral steroids  · ELIQUIS restarted - will watch for recurrent hemoptysis  · Monitor platelets  · PT, OT to work with pt  · Better but cannot DC home until O2 needs decrease        Thank you for this consult.  I will follow with you while the patient is hospitalized.  Please call ([phone]) if you have any questions.    Timo Ambrocio MD           08:16

## 2021-02-05 ENCOUNTER — IMMUNIZATION (OUTPATIENT)
Dept: FAMILY MEDICINE | Facility: CLINIC | Age: 86
End: 2021-02-05
Payer: MEDICARE

## 2021-02-05 DIAGNOSIS — Z23 NEED FOR VACCINATION: Primary | ICD-10-CM

## 2021-02-05 PROCEDURE — 91300 COVID-19, MRNA, LNP-S, PF, 30 MCG/0.3 ML DOSE VACCINE: CPT | Mod: PBBFAC | Performed by: FAMILY MEDICINE

## 2021-02-05 PROCEDURE — 0002A COVID-19, MRNA, LNP-S, PF, 30 MCG/0.3 ML DOSE VACCINE: CPT | Mod: PBBFAC | Performed by: FAMILY MEDICINE

## 2021-02-18 ENCOUNTER — HOSPITAL ENCOUNTER (OUTPATIENT)
Dept: CARDIOLOGY | Facility: CLINIC | Age: 86
Discharge: HOME | End: 2021-02-18
Attending: INTERNAL MEDICINE
Payer: MEDICARE

## 2021-02-18 DIAGNOSIS — I49.5 SSS (SICK SINUS SYNDROME): ICD-10-CM

## 2021-02-18 PROCEDURE — 93294 REM INTERROG EVL PM/LDLS PM: CPT | Mod: S$GLB,,, | Performed by: INTERNAL MEDICINE

## 2021-02-18 PROCEDURE — 93296 REM INTERROG EVL PM/IDS: CPT | Mod: S$GLB,,, | Performed by: INTERNAL MEDICINE

## 2021-02-18 PROCEDURE — 93296 CARDIAC DEVICE CHECK - REMOTE: ICD-10-PCS | Mod: S$GLB,,, | Performed by: INTERNAL MEDICINE

## 2021-02-18 PROCEDURE — 93294 CARDIAC DEVICE CHECK - REMOTE: ICD-10-PCS | Mod: S$GLB,,, | Performed by: INTERNAL MEDICINE

## 2021-03-30 DIAGNOSIS — I49.5 SSS (SICK SINUS SYNDROME): Primary | ICD-10-CM

## 2021-05-19 LAB — IMPEDANCE RA LEAD: 468 OHMS

## 2021-05-20 ENCOUNTER — HOSPITAL ENCOUNTER (OUTPATIENT)
Dept: CARDIOLOGY | Facility: CLINIC | Age: 86
Discharge: HOME | End: 2021-05-20
Attending: INTERNAL MEDICINE
Payer: MEDICARE

## 2021-05-20 DIAGNOSIS — I49.5 SSS (SICK SINUS SYNDROME): ICD-10-CM

## 2021-05-20 PROCEDURE — 93294 CARDIAC DEVICE CHECK - REMOTE: ICD-10-PCS | Mod: S$GLB,,, | Performed by: INTERNAL MEDICINE

## 2021-05-20 PROCEDURE — 93296 REM INTERROG EVL PM/IDS: CPT | Mod: S$GLB,,, | Performed by: INTERNAL MEDICINE

## 2021-05-20 PROCEDURE — 93296 CARDIAC DEVICE CHECK - REMOTE: ICD-10-PCS | Mod: S$GLB,,, | Performed by: INTERNAL MEDICINE

## 2021-05-20 PROCEDURE — 93294 REM INTERROG EVL PM/LDLS PM: CPT | Mod: S$GLB,,, | Performed by: INTERNAL MEDICINE

## 2021-06-04 LAB — IMPEDANCE RA LEAD: 468 OHMS

## 2021-08-09 ENCOUNTER — HOSPITAL ENCOUNTER (INPATIENT)
Facility: HOSPITAL | Age: 86
LOS: 4 days | Discharge: HOME HEALTH | DRG: 439 | End: 2021-08-13
Attending: EMERGENCY MEDICINE | Admitting: INTERNAL MEDICINE
Payer: MEDICARE

## 2021-08-09 DIAGNOSIS — K85.00 IDIOPATHIC ACUTE PANCREATITIS: ICD-10-CM

## 2021-08-09 DIAGNOSIS — K85.90 ACUTE PANCREATITIS: ICD-10-CM

## 2021-08-09 DIAGNOSIS — R10.12 LUQ PAIN: ICD-10-CM

## 2021-08-09 DIAGNOSIS — Z87.09 HISTORY OF PULMONARY FIBROSIS: ICD-10-CM

## 2021-08-09 DIAGNOSIS — R10.9 ABDOMINAL PAIN: ICD-10-CM

## 2021-08-09 DIAGNOSIS — J96.11 CHRONIC RESPIRATORY FAILURE WITH HYPOXIA: ICD-10-CM

## 2021-08-09 LAB
ALBUMIN SERPL BCP-MCNC: 4.8 G/DL (ref 3.5–5.2)
ALP SERPL-CCNC: 74 U/L (ref 55–135)
ALT SERPL W/O P-5'-P-CCNC: 30 U/L (ref 10–44)
ANION GAP SERPL CALC-SCNC: 12 MMOL/L (ref 8–16)
AST SERPL-CCNC: 35 U/L (ref 10–40)
BASOPHILS # BLD AUTO: 0.1 K/UL (ref 0–0.2)
BASOPHILS NFR BLD: 1 % (ref 0–1.9)
BILIRUB SERPL-MCNC: 1 MG/DL (ref 0.1–1)
BNP SERPL-MCNC: 119 PG/ML (ref 0–99)
BUN SERPL-MCNC: 22 MG/DL (ref 8–23)
CALCIUM SERPL-MCNC: 10.1 MG/DL (ref 8.7–10.5)
CHLORIDE SERPL-SCNC: 101 MMOL/L (ref 95–110)
CO2 SERPL-SCNC: 26 MMOL/L (ref 23–29)
CREAT SERPL-MCNC: 1.4 MG/DL (ref 0.5–1.4)
DIFFERENTIAL METHOD: ABNORMAL
EOSINOPHIL # BLD AUTO: 0.2 K/UL (ref 0–0.5)
EOSINOPHIL NFR BLD: 2.3 % (ref 0–8)
ERYTHROCYTE [DISTWIDTH] IN BLOOD BY AUTOMATED COUNT: 14.4 % (ref 11.5–14.5)
EST. GFR  (AFRICAN AMERICAN): 51.5 ML/MIN/1.73 M^2
EST. GFR  (NON AFRICAN AMERICAN): 44.5 ML/MIN/1.73 M^2
GLUCOSE SERPL-MCNC: 122 MG/DL (ref 70–110)
HCT VFR BLD AUTO: 52.7 % (ref 40–54)
HGB BLD-MCNC: 17.8 G/DL (ref 14–18)
IMM GRANULOCYTES # BLD AUTO: 0.39 K/UL (ref 0–0.04)
IMM GRANULOCYTES NFR BLD AUTO: 3.7 % (ref 0–0.5)
LIPASE SERPL-CCNC: 180 U/L (ref 4–60)
LYMPHOCYTES # BLD AUTO: 5.4 K/UL (ref 1–4.8)
LYMPHOCYTES NFR BLD: 52.2 % (ref 18–48)
MCH RBC QN AUTO: 31.6 PG (ref 27–31)
MCHC RBC AUTO-ENTMCNC: 33.8 G/DL (ref 32–36)
MCV RBC AUTO: 93 FL (ref 82–98)
MONOCYTES # BLD AUTO: 0.8 K/UL (ref 0.3–1)
MONOCYTES NFR BLD: 7.3 % (ref 4–15)
NEUTROPHILS # BLD AUTO: 3.5 K/UL (ref 1.8–7.7)
NEUTROPHILS NFR BLD: 33.5 % (ref 38–73)
NRBC BLD-RTO: 1 /100 WBC
PLATELET # BLD AUTO: 120 K/UL (ref 150–450)
PLATELET BLD QL SMEAR: ABNORMAL
PMV BLD AUTO: 10.9 FL (ref 9.2–12.9)
POTASSIUM SERPL-SCNC: 4.1 MMOL/L (ref 3.5–5.1)
PROT SERPL-MCNC: 7.5 G/DL (ref 6–8.4)
RBC # BLD AUTO: 5.64 M/UL (ref 4.6–6.2)
SODIUM SERPL-SCNC: 139 MMOL/L (ref 136–145)
TRIGL SERPL-MCNC: 332 MG/DL (ref 30–150)
TROPONIN I SERPL DL<=0.01 NG/ML-MCNC: <0.03 NG/ML
WBC # BLD AUTO: 10.41 K/UL (ref 3.9–12.7)

## 2021-08-09 PROCEDURE — 83690 ASSAY OF LIPASE: CPT | Performed by: EMERGENCY MEDICINE

## 2021-08-09 PROCEDURE — 96375 TX/PRO/DX INJ NEW DRUG ADDON: CPT

## 2021-08-09 PROCEDURE — 85025 COMPLETE CBC W/AUTO DIFF WBC: CPT | Performed by: EMERGENCY MEDICINE

## 2021-08-09 PROCEDURE — 93010 EKG 12-LEAD: ICD-10-PCS | Mod: ,,, | Performed by: SPECIALIST

## 2021-08-09 PROCEDURE — 63600175 PHARM REV CODE 636 W HCPCS: Performed by: EMERGENCY MEDICINE

## 2021-08-09 PROCEDURE — 93005 ELECTROCARDIOGRAM TRACING: CPT | Performed by: SPECIALIST

## 2021-08-09 PROCEDURE — 25500020 PHARM REV CODE 255: Performed by: STUDENT IN AN ORGANIZED HEALTH CARE EDUCATION/TRAINING PROGRAM

## 2021-08-09 PROCEDURE — 96374 THER/PROPH/DIAG INJ IV PUSH: CPT

## 2021-08-09 PROCEDURE — 12000002 HC ACUTE/MED SURGE SEMI-PRIVATE ROOM

## 2021-08-09 PROCEDURE — 36415 COLL VENOUS BLD VENIPUNCTURE: CPT | Performed by: EMERGENCY MEDICINE

## 2021-08-09 PROCEDURE — 96376 TX/PRO/DX INJ SAME DRUG ADON: CPT

## 2021-08-09 PROCEDURE — 93010 ELECTROCARDIOGRAM REPORT: CPT | Mod: ,,, | Performed by: SPECIALIST

## 2021-08-09 PROCEDURE — 96361 HYDRATE IV INFUSION ADD-ON: CPT

## 2021-08-09 PROCEDURE — 99285 EMERGENCY DEPT VISIT HI MDM: CPT | Mod: 25

## 2021-08-09 PROCEDURE — 84478 ASSAY OF TRIGLYCERIDES: CPT | Performed by: EMERGENCY MEDICINE

## 2021-08-09 PROCEDURE — 63600175 PHARM REV CODE 636 W HCPCS: Performed by: STUDENT IN AN ORGANIZED HEALTH CARE EDUCATION/TRAINING PROGRAM

## 2021-08-09 PROCEDURE — 84484 ASSAY OF TROPONIN QUANT: CPT | Performed by: STUDENT IN AN ORGANIZED HEALTH CARE EDUCATION/TRAINING PROGRAM

## 2021-08-09 PROCEDURE — 80053 COMPREHEN METABOLIC PANEL: CPT | Performed by: EMERGENCY MEDICINE

## 2021-08-09 PROCEDURE — U0002 COVID-19 LAB TEST NON-CDC: HCPCS | Performed by: INTERNAL MEDICINE

## 2021-08-09 PROCEDURE — 83880 ASSAY OF NATRIURETIC PEPTIDE: CPT | Performed by: STUDENT IN AN ORGANIZED HEALTH CARE EDUCATION/TRAINING PROGRAM

## 2021-08-09 RX ORDER — FENOFIBRATE 54 MG/1
54 TABLET ORAL DAILY
Status: ON HOLD | COMMUNITY
End: 2022-01-26 | Stop reason: CLARIF

## 2021-08-09 RX ORDER — HYDROMORPHONE HYDROCHLORIDE 1 MG/ML
1 INJECTION, SOLUTION INTRAMUSCULAR; INTRAVENOUS; SUBCUTANEOUS
Status: COMPLETED | OUTPATIENT
Start: 2021-08-09 | End: 2021-08-09

## 2021-08-09 RX ORDER — CETIRIZINE HYDROCHLORIDE 10 MG/1
10 TABLET ORAL DAILY
COMMUNITY

## 2021-08-09 RX ORDER — LEVOTHYROXINE SODIUM 112 UG/1
112 TABLET ORAL
COMMUNITY
End: 2023-03-27

## 2021-08-09 RX ORDER — DIPHENHYDRAMINE HCL 25 MG
50 CAPSULE ORAL NIGHTLY PRN
COMMUNITY
End: 2021-08-10

## 2021-08-09 RX ORDER — ONDANSETRON 2 MG/ML
4 INJECTION INTRAMUSCULAR; INTRAVENOUS
Status: DISCONTINUED | OUTPATIENT
Start: 2021-08-09 | End: 2021-08-09

## 2021-08-09 RX ORDER — ROSUVASTATIN CALCIUM 40 MG/1
10 TABLET, COATED ORAL NIGHTLY
COMMUNITY

## 2021-08-09 RX ORDER — FLUOXETINE 10 MG/1
10 CAPSULE ORAL DAILY
COMMUNITY

## 2021-08-09 RX ORDER — PREGABALIN 200 MG/1
200 CAPSULE ORAL DAILY
COMMUNITY
End: 2022-01-25

## 2021-08-09 RX ORDER — ONDANSETRON 2 MG/ML
4 INJECTION INTRAMUSCULAR; INTRAVENOUS
Status: COMPLETED | OUTPATIENT
Start: 2021-08-09 | End: 2021-08-09

## 2021-08-09 RX ORDER — ACETAMINOPHEN 500 MG
1 TABLET ORAL DAILY
COMMUNITY

## 2021-08-09 RX ADMIN — SODIUM CHLORIDE, SODIUM LACTATE, POTASSIUM CHLORIDE, AND CALCIUM CHLORIDE 1000 ML: .6; .31; .03; .02 INJECTION, SOLUTION INTRAVENOUS at 08:08

## 2021-08-09 RX ADMIN — HYDROMORPHONE HYDROCHLORIDE 1 MG: 1 INJECTION, SOLUTION INTRAMUSCULAR; INTRAVENOUS; SUBCUTANEOUS at 08:08

## 2021-08-09 RX ADMIN — ONDANSETRON 4 MG: 2 INJECTION INTRAMUSCULAR; INTRAVENOUS at 08:08

## 2021-08-09 RX ADMIN — IOHEXOL 100 ML: 350 INJECTION, SOLUTION INTRAVENOUS at 09:08

## 2021-08-09 RX ADMIN — HYDROMORPHONE HYDROCHLORIDE 1 MG: 1 INJECTION, SOLUTION INTRAMUSCULAR; INTRAVENOUS; SUBCUTANEOUS at 09:08

## 2021-08-10 LAB
ALBUMIN SERPL BCP-MCNC: 4.3 G/DL (ref 3.5–5.2)
ALP SERPL-CCNC: 61 U/L (ref 55–135)
ALT SERPL W/O P-5'-P-CCNC: 27 U/L (ref 10–44)
AMYLASE SERPL-CCNC: 54 U/L (ref 20–110)
ANION GAP SERPL CALC-SCNC: 10 MMOL/L (ref 8–16)
AST SERPL-CCNC: 35 U/L (ref 10–40)
BASOPHILS # BLD AUTO: 0.1 K/UL (ref 0–0.2)
BASOPHILS NFR BLD: 0.8 % (ref 0–1.9)
BILIRUB SERPL-MCNC: 1 MG/DL (ref 0.1–1)
BILIRUB UR QL STRIP: NEGATIVE
BUN SERPL-MCNC: 20 MG/DL (ref 8–23)
CALCIUM SERPL-MCNC: 9.3 MG/DL (ref 8.7–10.5)
CHLORIDE SERPL-SCNC: 103 MMOL/L (ref 95–110)
CLARITY UR: CLEAR
CO2 SERPL-SCNC: 27 MMOL/L (ref 23–29)
COLOR UR: YELLOW
CREAT SERPL-MCNC: 1.3 MG/DL (ref 0.5–1.4)
CRP SERPL-MCNC: 0.55 MG/DL
DIFFERENTIAL METHOD: ABNORMAL
EOSINOPHIL # BLD AUTO: 0.1 K/UL (ref 0–0.5)
EOSINOPHIL NFR BLD: 0.9 % (ref 0–8)
ERYTHROCYTE [DISTWIDTH] IN BLOOD BY AUTOMATED COUNT: 14.4 % (ref 11.5–14.5)
EST. GFR  (AFRICAN AMERICAN): 56.3 ML/MIN/1.73 M^2
EST. GFR  (NON AFRICAN AMERICAN): 48.7 ML/MIN/1.73 M^2
GLUCOSE SERPL-MCNC: 104 MG/DL (ref 70–110)
GLUCOSE SERPL-MCNC: 109 MG/DL (ref 70–110)
GLUCOSE SERPL-MCNC: 110 MG/DL (ref 70–110)
GLUCOSE SERPL-MCNC: 110 MG/DL (ref 70–110)
GLUCOSE SERPL-MCNC: 136 MG/DL (ref 70–110)
GLUCOSE UR QL STRIP: NEGATIVE
HCT VFR BLD AUTO: 48.6 % (ref 40–54)
HGB BLD-MCNC: 15.9 G/DL (ref 14–18)
HGB UR QL STRIP: NEGATIVE
IMM GRANULOCYTES # BLD AUTO: 0.44 K/UL (ref 0–0.04)
IMM GRANULOCYTES NFR BLD AUTO: 3.3 % (ref 0–0.5)
KETONES UR QL STRIP: NEGATIVE
LEUKOCYTE ESTERASE UR QL STRIP: NEGATIVE
LIPASE SERPL-CCNC: 77 U/L (ref 4–60)
LYMPHOCYTES # BLD AUTO: 4.2 K/UL (ref 1–4.8)
LYMPHOCYTES NFR BLD: 31.8 % (ref 18–48)
MCH RBC QN AUTO: 31.5 PG (ref 27–31)
MCHC RBC AUTO-ENTMCNC: 32.7 G/DL (ref 32–36)
MCV RBC AUTO: 96 FL (ref 82–98)
MONOCYTES # BLD AUTO: 1.2 K/UL (ref 0.3–1)
MONOCYTES NFR BLD: 9.3 % (ref 4–15)
NEUTROPHILS # BLD AUTO: 7.2 K/UL (ref 1.8–7.7)
NEUTROPHILS NFR BLD: 53.9 % (ref 38–73)
NITRITE UR QL STRIP: NEGATIVE
NRBC BLD-RTO: 1 /100 WBC
PH UR STRIP: 7 [PH] (ref 5–8)
PLATELET # BLD AUTO: 113 K/UL (ref 150–450)
PMV BLD AUTO: 11.1 FL (ref 9.2–12.9)
POTASSIUM SERPL-SCNC: 4.7 MMOL/L (ref 3.5–5.1)
PROT SERPL-MCNC: 6.5 G/DL (ref 6–8.4)
PROT UR QL STRIP: NEGATIVE
RBC # BLD AUTO: 5.05 M/UL (ref 4.6–6.2)
SARS-COV-2 RDRP RESP QL NAA+PROBE: NEGATIVE
SODIUM SERPL-SCNC: 140 MMOL/L (ref 136–145)
SP GR UR STRIP: 1.02 (ref 1–1.03)
URN SPEC COLLECT METH UR: NORMAL
UROBILINOGEN UR STRIP-ACNC: NEGATIVE EU/DL
WBC # BLD AUTO: 13.28 K/UL (ref 3.9–12.7)

## 2021-08-10 PROCEDURE — 80053 COMPREHEN METABOLIC PANEL: CPT | Performed by: INTERNAL MEDICINE

## 2021-08-10 PROCEDURE — 86140 C-REACTIVE PROTEIN: CPT | Performed by: INTERNAL MEDICINE

## 2021-08-10 PROCEDURE — 12000002 HC ACUTE/MED SURGE SEMI-PRIVATE ROOM

## 2021-08-10 PROCEDURE — 82962 GLUCOSE BLOOD TEST: CPT

## 2021-08-10 PROCEDURE — C9113 INJ PANTOPRAZOLE SODIUM, VIA: HCPCS | Performed by: INTERNAL MEDICINE

## 2021-08-10 PROCEDURE — 82150 ASSAY OF AMYLASE: CPT | Performed by: INTERNAL MEDICINE

## 2021-08-10 PROCEDURE — 63600175 PHARM REV CODE 636 W HCPCS: Performed by: INTERNAL MEDICINE

## 2021-08-10 PROCEDURE — 25000003 PHARM REV CODE 250: Performed by: INTERNAL MEDICINE

## 2021-08-10 PROCEDURE — 85025 COMPLETE CBC W/AUTO DIFF WBC: CPT | Performed by: INTERNAL MEDICINE

## 2021-08-10 PROCEDURE — 36415 COLL VENOUS BLD VENIPUNCTURE: CPT | Performed by: INTERNAL MEDICINE

## 2021-08-10 PROCEDURE — 83690 ASSAY OF LIPASE: CPT | Performed by: INTERNAL MEDICINE

## 2021-08-10 PROCEDURE — 81003 URINALYSIS AUTO W/O SCOPE: CPT | Performed by: INTERNAL MEDICINE

## 2021-08-10 RX ORDER — FUROSEMIDE 10 MG/ML
40 INJECTION INTRAMUSCULAR; INTRAVENOUS DAILY
Status: DISCONTINUED | OUTPATIENT
Start: 2021-08-10 | End: 2021-08-11

## 2021-08-10 RX ORDER — FUROSEMIDE 10 MG/ML
40 INJECTION INTRAMUSCULAR; INTRAVENOUS DAILY
Status: DISCONTINUED | OUTPATIENT
Start: 2021-08-10 | End: 2021-08-10

## 2021-08-10 RX ORDER — ONDANSETRON 2 MG/ML
4 INJECTION INTRAMUSCULAR; INTRAVENOUS EVERY 8 HOURS PRN
Status: DISCONTINUED | OUTPATIENT
Start: 2021-08-10 | End: 2021-08-13 | Stop reason: HOSPADM

## 2021-08-10 RX ORDER — POTASSIUM CHLORIDE 20 MEQ/1
20 TABLET, EXTENDED RELEASE ORAL
Status: DISCONTINUED | OUTPATIENT
Start: 2021-08-10 | End: 2021-08-13 | Stop reason: HOSPADM

## 2021-08-10 RX ORDER — TALC
6 POWDER (GRAM) TOPICAL NIGHTLY PRN
Status: DISCONTINUED | OUTPATIENT
Start: 2021-08-10 | End: 2021-08-13 | Stop reason: HOSPADM

## 2021-08-10 RX ORDER — LANOLIN ALCOHOL/MO/W.PET/CERES
800 CREAM (GRAM) TOPICAL
Status: DISCONTINUED | OUTPATIENT
Start: 2021-08-10 | End: 2021-08-13 | Stop reason: HOSPADM

## 2021-08-10 RX ORDER — MEROPENEM AND SODIUM CHLORIDE 1 G/50ML
1 INJECTION, SOLUTION INTRAVENOUS
Status: DISCONTINUED | OUTPATIENT
Start: 2021-08-10 | End: 2021-08-13 | Stop reason: HOSPADM

## 2021-08-10 RX ORDER — MAGNESIUM SULFATE HEPTAHYDRATE 40 MG/ML
4 INJECTION, SOLUTION INTRAVENOUS
Status: DISCONTINUED | OUTPATIENT
Start: 2021-08-10 | End: 2021-08-13 | Stop reason: HOSPADM

## 2021-08-10 RX ORDER — LEVOFLOXACIN 5 MG/ML
500 INJECTION, SOLUTION INTRAVENOUS
Status: DISCONTINUED | OUTPATIENT
Start: 2021-08-10 | End: 2021-08-10

## 2021-08-10 RX ORDER — POTASSIUM CHLORIDE 7.45 MG/ML
20 INJECTION INTRAVENOUS
Status: DISCONTINUED | OUTPATIENT
Start: 2021-08-10 | End: 2021-08-13 | Stop reason: HOSPADM

## 2021-08-10 RX ORDER — POTASSIUM CHLORIDE 20 MEQ/1
40 TABLET, EXTENDED RELEASE ORAL
Status: DISCONTINUED | OUTPATIENT
Start: 2021-08-10 | End: 2021-08-13 | Stop reason: HOSPADM

## 2021-08-10 RX ORDER — MORPHINE SULFATE 4 MG/ML
4 INJECTION, SOLUTION INTRAMUSCULAR; INTRAVENOUS EVERY 4 HOURS PRN
Status: DISCONTINUED | OUTPATIENT
Start: 2021-08-10 | End: 2021-08-13 | Stop reason: HOSPADM

## 2021-08-10 RX ORDER — ENOXAPARIN SODIUM 100 MG/ML
40 INJECTION SUBCUTANEOUS EVERY 24 HOURS
Status: DISCONTINUED | OUTPATIENT
Start: 2021-08-10 | End: 2021-08-13 | Stop reason: HOSPADM

## 2021-08-10 RX ORDER — MAGNESIUM SULFATE HEPTAHYDRATE 40 MG/ML
2 INJECTION, SOLUTION INTRAVENOUS
Status: DISCONTINUED | OUTPATIENT
Start: 2021-08-10 | End: 2021-08-13 | Stop reason: HOSPADM

## 2021-08-10 RX ORDER — POTASSIUM CHLORIDE 7.45 MG/ML
40 INJECTION INTRAVENOUS
Status: DISCONTINUED | OUTPATIENT
Start: 2021-08-10 | End: 2021-08-13 | Stop reason: HOSPADM

## 2021-08-10 RX ORDER — SODIUM CHLORIDE 9 MG/ML
INJECTION, SOLUTION INTRAVENOUS CONTINUOUS
Status: DISCONTINUED | OUTPATIENT
Start: 2021-08-10 | End: 2021-08-10

## 2021-08-10 RX ORDER — ACETAMINOPHEN 325 MG/1
650 TABLET ORAL EVERY 8 HOURS PRN
Status: DISCONTINUED | OUTPATIENT
Start: 2021-08-10 | End: 2021-08-13 | Stop reason: HOSPADM

## 2021-08-10 RX ORDER — MORPHINE SULFATE 2 MG/ML
2 INJECTION, SOLUTION INTRAMUSCULAR; INTRAVENOUS EVERY 4 HOURS PRN
Status: DISCONTINUED | OUTPATIENT
Start: 2021-08-10 | End: 2021-08-13 | Stop reason: HOSPADM

## 2021-08-10 RX ORDER — MAGNESIUM SULFATE 1 G/100ML
1 INJECTION INTRAVENOUS
Status: DISCONTINUED | OUTPATIENT
Start: 2021-08-10 | End: 2021-08-13 | Stop reason: HOSPADM

## 2021-08-10 RX ORDER — DIPHENHYDRAMINE HCL 50 MG
50 CAPSULE ORAL NIGHTLY
COMMUNITY
End: 2023-03-27

## 2021-08-10 RX ORDER — ENOXAPARIN SODIUM 100 MG/ML
40 INJECTION SUBCUTANEOUS EVERY 12 HOURS
Status: DISCONTINUED | OUTPATIENT
Start: 2021-08-10 | End: 2021-08-10

## 2021-08-10 RX ORDER — PANTOPRAZOLE SODIUM 40 MG/10ML
40 INJECTION, POWDER, LYOPHILIZED, FOR SOLUTION INTRAVENOUS DAILY
Status: DISCONTINUED | OUTPATIENT
Start: 2021-08-10 | End: 2021-08-11

## 2021-08-10 RX ORDER — DICLOFENAC SODIUM 10 MG/G
1 GEL TOPICAL DAILY PRN
COMMUNITY
Start: 2021-03-15 | End: 2023-03-27

## 2021-08-10 RX ADMIN — MORPHINE SULFATE 2 MG: 2 INJECTION, SOLUTION INTRAMUSCULAR; INTRAVENOUS at 05:08

## 2021-08-10 RX ADMIN — ACETAMINOPHEN 650 MG: 325 TABLET, FILM COATED ORAL at 08:08

## 2021-08-10 RX ADMIN — FUROSEMIDE 40 MG: 10 INJECTION, SOLUTION INTRAMUSCULAR; INTRAVENOUS at 10:08

## 2021-08-10 RX ADMIN — MORPHINE SULFATE 2 MG: 2 INJECTION, SOLUTION INTRAMUSCULAR; INTRAVENOUS at 09:08

## 2021-08-10 RX ADMIN — ENOXAPARIN SODIUM 40 MG: 40 INJECTION SUBCUTANEOUS at 05:08

## 2021-08-10 RX ADMIN — PANTOPRAZOLE SODIUM 40 MG: 40 INJECTION, POWDER, LYOPHILIZED, FOR SOLUTION INTRAVENOUS at 05:08

## 2021-08-10 RX ADMIN — MORPHINE SULFATE 2 MG: 2 INJECTION, SOLUTION INTRAMUSCULAR; INTRAVENOUS at 01:08

## 2021-08-10 RX ADMIN — SODIUM CHLORIDE: 0.9 INJECTION, SOLUTION INTRAVENOUS at 01:08

## 2021-08-11 PROBLEM — K76.9 LIVER LESION: Status: ACTIVE | Noted: 2021-08-11

## 2021-08-11 LAB
ALBUMIN SERPL BCP-MCNC: 4.1 G/DL (ref 3.5–5.2)
ALP SERPL-CCNC: 53 U/L (ref 55–135)
ALT SERPL W/O P-5'-P-CCNC: 21 U/L (ref 10–44)
ANION GAP SERPL CALC-SCNC: 12 MMOL/L (ref 8–16)
AST SERPL-CCNC: 29 U/L (ref 10–40)
BASOPHILS # BLD AUTO: 0.12 K/UL (ref 0–0.2)
BASOPHILS NFR BLD: 1 % (ref 0–1.9)
BILIRUB SERPL-MCNC: 1.5 MG/DL (ref 0.1–1)
BNP SERPL-MCNC: 229 PG/ML (ref 0–99)
BUN SERPL-MCNC: 20 MG/DL (ref 8–23)
CALCIUM SERPL-MCNC: 9.3 MG/DL (ref 8.7–10.5)
CHLORIDE SERPL-SCNC: 100 MMOL/L (ref 95–110)
CHOLEST SERPL-MCNC: 183 MG/DL (ref 120–199)
CHOLEST/HDLC SERPL: 5.1 {RATIO} (ref 2–5)
CO2 SERPL-SCNC: 28 MMOL/L (ref 23–29)
CREAT SERPL-MCNC: 1.5 MG/DL (ref 0.5–1.4)
CRP SERPL-MCNC: 7.84 MG/DL
DIFFERENTIAL METHOD: ABNORMAL
EOSINOPHIL # BLD AUTO: 0.2 K/UL (ref 0–0.5)
EOSINOPHIL NFR BLD: 1.3 % (ref 0–8)
ERYTHROCYTE [DISTWIDTH] IN BLOOD BY AUTOMATED COUNT: 14.8 % (ref 11.5–14.5)
EST. GFR  (AFRICAN AMERICAN): 47.4 ML/MIN/1.73 M^2
EST. GFR  (NON AFRICAN AMERICAN): 41 ML/MIN/1.73 M^2
GLUCOSE SERPL-MCNC: 101 MG/DL (ref 70–110)
GLUCOSE SERPL-MCNC: 114 MG/DL (ref 70–110)
GLUCOSE SERPL-MCNC: 137 MG/DL (ref 70–110)
GLUCOSE SERPL-MCNC: 99 MG/DL (ref 70–110)
HCT VFR BLD AUTO: 53.5 % (ref 40–54)
HDLC SERPL-MCNC: 36 MG/DL (ref 40–75)
HDLC SERPL: 19.7 % (ref 20–50)
HGB BLD-MCNC: 17.6 G/DL (ref 14–18)
IMM GRANULOCYTES # BLD AUTO: 0.26 K/UL (ref 0–0.04)
IMM GRANULOCYTES NFR BLD AUTO: 2.2 % (ref 0–0.5)
LDLC SERPL CALC-MCNC: 119 MG/DL (ref 63–159)
LIPASE SERPL-CCNC: 53 U/L (ref 4–60)
LYMPHOCYTES # BLD AUTO: 4.7 K/UL (ref 1–4.8)
LYMPHOCYTES NFR BLD: 39 % (ref 18–48)
MCH RBC QN AUTO: 32.1 PG (ref 27–31)
MCHC RBC AUTO-ENTMCNC: 32.9 G/DL (ref 32–36)
MCV RBC AUTO: 98 FL (ref 82–98)
MONOCYTES # BLD AUTO: 1.4 K/UL (ref 0.3–1)
MONOCYTES NFR BLD: 11.5 % (ref 4–15)
NEUTROPHILS # BLD AUTO: 5.4 K/UL (ref 1.8–7.7)
NEUTROPHILS NFR BLD: 45 % (ref 38–73)
NONHDLC SERPL-MCNC: 147 MG/DL
NRBC BLD-RTO: 0 /100 WBC
PLATELET # BLD AUTO: 120 K/UL (ref 150–450)
PLATELET BLD QL SMEAR: ABNORMAL
PMV BLD AUTO: 12.4 FL (ref 9.2–12.9)
POTASSIUM SERPL-SCNC: 4.7 MMOL/L (ref 3.5–5.1)
PROT SERPL-MCNC: 6.5 G/DL (ref 6–8.4)
RBC # BLD AUTO: 5.48 M/UL (ref 4.6–6.2)
SODIUM SERPL-SCNC: 140 MMOL/L (ref 136–145)
TRIGL SERPL-MCNC: 140 MG/DL (ref 30–150)
WBC # BLD AUTO: 12.05 K/UL (ref 3.9–12.7)

## 2021-08-11 PROCEDURE — 87040 BLOOD CULTURE FOR BACTERIA: CPT | Mod: 59 | Performed by: INTERNAL MEDICINE

## 2021-08-11 PROCEDURE — 87086 URINE CULTURE/COLONY COUNT: CPT | Performed by: INTERNAL MEDICINE

## 2021-08-11 PROCEDURE — 82962 GLUCOSE BLOOD TEST: CPT

## 2021-08-11 PROCEDURE — 63600175 PHARM REV CODE 636 W HCPCS: Performed by: INTERNAL MEDICINE

## 2021-08-11 PROCEDURE — C9113 INJ PANTOPRAZOLE SODIUM, VIA: HCPCS | Performed by: INTERNAL MEDICINE

## 2021-08-11 PROCEDURE — 80053 COMPREHEN METABOLIC PANEL: CPT | Performed by: INTERNAL MEDICINE

## 2021-08-11 PROCEDURE — 25000003 PHARM REV CODE 250: Performed by: INTERNAL MEDICINE

## 2021-08-11 PROCEDURE — 83880 ASSAY OF NATRIURETIC PEPTIDE: CPT | Performed by: INTERNAL MEDICINE

## 2021-08-11 PROCEDURE — 83690 ASSAY OF LIPASE: CPT | Performed by: INTERNAL MEDICINE

## 2021-08-11 PROCEDURE — 86140 C-REACTIVE PROTEIN: CPT | Performed by: INTERNAL MEDICINE

## 2021-08-11 PROCEDURE — 12000002 HC ACUTE/MED SURGE SEMI-PRIVATE ROOM

## 2021-08-11 PROCEDURE — 36415 COLL VENOUS BLD VENIPUNCTURE: CPT | Performed by: INTERNAL MEDICINE

## 2021-08-11 PROCEDURE — 85025 COMPLETE CBC W/AUTO DIFF WBC: CPT | Performed by: INTERNAL MEDICINE

## 2021-08-11 PROCEDURE — 80061 LIPID PANEL: CPT | Performed by: INTERNAL MEDICINE

## 2021-08-11 RX ORDER — PANTOPRAZOLE SODIUM 40 MG/1
40 TABLET, DELAYED RELEASE ORAL DAILY
Status: DISCONTINUED | OUTPATIENT
Start: 2021-08-11 | End: 2021-08-13 | Stop reason: HOSPADM

## 2021-08-11 RX ADMIN — ACETAMINOPHEN 650 MG: 325 TABLET, FILM COATED ORAL at 08:08

## 2021-08-11 RX ADMIN — MEROPENEM AND SODIUM CHLORIDE 1 G: 1 INJECTION, SOLUTION INTRAVENOUS at 12:08

## 2021-08-11 RX ADMIN — ENOXAPARIN SODIUM 40 MG: 40 INJECTION SUBCUTANEOUS at 04:08

## 2021-08-11 RX ADMIN — MEROPENEM AND SODIUM CHLORIDE 1 G: 1 INJECTION, SOLUTION INTRAVENOUS at 09:08

## 2021-08-11 RX ADMIN — PANTOPRAZOLE SODIUM 40 MG: 40 INJECTION, POWDER, LYOPHILIZED, FOR SOLUTION INTRAVENOUS at 09:08

## 2021-08-11 RX ADMIN — MEROPENEM AND SODIUM CHLORIDE 1 G: 1 INJECTION, SOLUTION INTRAVENOUS at 04:08

## 2021-08-11 RX ADMIN — ACETAMINOPHEN 650 MG: 325 TABLET, FILM COATED ORAL at 09:08

## 2021-08-12 LAB
ALBUMIN SERPL BCP-MCNC: 3.6 G/DL (ref 3.5–5.2)
ALP SERPL-CCNC: 47 U/L (ref 55–135)
ALT SERPL W/O P-5'-P-CCNC: 21 U/L (ref 10–44)
ANION GAP SERPL CALC-SCNC: 6 MMOL/L (ref 8–16)
AST SERPL-CCNC: 25 U/L (ref 10–40)
BASOPHILS # BLD AUTO: 0.09 K/UL (ref 0–0.2)
BASOPHILS NFR BLD: 1.2 % (ref 0–1.9)
BILIRUB SERPL-MCNC: 1 MG/DL (ref 0.1–1)
BUN SERPL-MCNC: 19 MG/DL (ref 8–23)
CALCIUM SERPL-MCNC: 8.7 MG/DL (ref 8.7–10.5)
CHLORIDE SERPL-SCNC: 102 MMOL/L (ref 95–110)
CO2 SERPL-SCNC: 30 MMOL/L (ref 23–29)
CREAT SERPL-MCNC: 1.4 MG/DL (ref 0.5–1.4)
CRP SERPL-MCNC: 7.87 MG/DL
DIFFERENTIAL METHOD: ABNORMAL
EOSINOPHIL # BLD AUTO: 0.2 K/UL (ref 0–0.5)
EOSINOPHIL NFR BLD: 2.8 % (ref 0–8)
ERYTHROCYTE [DISTWIDTH] IN BLOOD BY AUTOMATED COUNT: 14.2 % (ref 11.5–14.5)
EST. GFR  (AFRICAN AMERICAN): 51.5 ML/MIN/1.73 M^2
EST. GFR  (NON AFRICAN AMERICAN): 44.5 ML/MIN/1.73 M^2
GLUCOSE SERPL-MCNC: 130 MG/DL (ref 70–110)
GLUCOSE SERPL-MCNC: 131 MG/DL (ref 70–110)
GLUCOSE SERPL-MCNC: 133 MG/DL (ref 70–110)
GLUCOSE SERPL-MCNC: 151 MG/DL (ref 70–110)
GLUCOSE SERPL-MCNC: 155 MG/DL (ref 70–110)
HCT VFR BLD AUTO: 46.5 % (ref 40–54)
HGB BLD-MCNC: 15.1 G/DL (ref 14–18)
IMM GRANULOCYTES # BLD AUTO: 0.22 K/UL (ref 0–0.04)
IMM GRANULOCYTES NFR BLD AUTO: 2.8 % (ref 0–0.5)
LYMPHOCYTES # BLD AUTO: 3.7 K/UL (ref 1–4.8)
LYMPHOCYTES NFR BLD: 47.6 % (ref 18–48)
MCH RBC QN AUTO: 31.1 PG (ref 27–31)
MCHC RBC AUTO-ENTMCNC: 32.5 G/DL (ref 32–36)
MCV RBC AUTO: 96 FL (ref 82–98)
MONOCYTES # BLD AUTO: 0.9 K/UL (ref 0.3–1)
MONOCYTES NFR BLD: 11.4 % (ref 4–15)
NEUTROPHILS # BLD AUTO: 2.7 K/UL (ref 1.8–7.7)
NEUTROPHILS NFR BLD: 34.2 % (ref 38–73)
NRBC BLD-RTO: 0 /100 WBC
PLATELET # BLD AUTO: 100 K/UL (ref 150–450)
PMV BLD AUTO: 11.3 FL (ref 9.2–12.9)
POTASSIUM SERPL-SCNC: 3.8 MMOL/L (ref 3.5–5.1)
PROT SERPL-MCNC: 5.8 G/DL (ref 6–8.4)
RBC # BLD AUTO: 4.85 M/UL (ref 4.6–6.2)
SODIUM SERPL-SCNC: 138 MMOL/L (ref 136–145)
WBC # BLD AUTO: 7.75 K/UL (ref 3.9–12.7)

## 2021-08-12 PROCEDURE — 99900035 HC TECH TIME PER 15 MIN (STAT)

## 2021-08-12 PROCEDURE — 27000221 HC OXYGEN, UP TO 24 HOURS

## 2021-08-12 PROCEDURE — 99222 1ST HOSP IP/OBS MODERATE 55: CPT | Mod: ,,, | Performed by: INTERNAL MEDICINE

## 2021-08-12 PROCEDURE — 86140 C-REACTIVE PROTEIN: CPT | Performed by: INTERNAL MEDICINE

## 2021-08-12 PROCEDURE — 36415 COLL VENOUS BLD VENIPUNCTURE: CPT | Performed by: INTERNAL MEDICINE

## 2021-08-12 PROCEDURE — 25000003 PHARM REV CODE 250: Performed by: INTERNAL MEDICINE

## 2021-08-12 PROCEDURE — 94761 N-INVAS EAR/PLS OXIMETRY MLT: CPT

## 2021-08-12 PROCEDURE — 12000002 HC ACUTE/MED SURGE SEMI-PRIVATE ROOM

## 2021-08-12 PROCEDURE — 80053 COMPREHEN METABOLIC PANEL: CPT | Performed by: INTERNAL MEDICINE

## 2021-08-12 PROCEDURE — 82962 GLUCOSE BLOOD TEST: CPT

## 2021-08-12 PROCEDURE — 99900031 HC PATIENT EDUCATION (STAT)

## 2021-08-12 PROCEDURE — 63600175 PHARM REV CODE 636 W HCPCS: Performed by: INTERNAL MEDICINE

## 2021-08-12 PROCEDURE — 85025 COMPLETE CBC W/AUTO DIFF WBC: CPT | Performed by: INTERNAL MEDICINE

## 2021-08-12 PROCEDURE — 99222 PR INITIAL HOSPITAL CARE,LEVL II: ICD-10-PCS | Mod: ,,, | Performed by: INTERNAL MEDICINE

## 2021-08-12 RX ORDER — FENOFIBRATE 48 MG/1
48 TABLET, FILM COATED ORAL DAILY
Status: DISCONTINUED | OUTPATIENT
Start: 2021-08-12 | End: 2021-08-13 | Stop reason: HOSPADM

## 2021-08-12 RX ORDER — LEVOTHYROXINE SODIUM 112 UG/1
112 TABLET ORAL
Status: DISCONTINUED | OUTPATIENT
Start: 2021-08-13 | End: 2021-08-13 | Stop reason: HOSPADM

## 2021-08-12 RX ORDER — FLUOXETINE 10 MG/1
10 CAPSULE ORAL DAILY
Status: DISCONTINUED | OUTPATIENT
Start: 2021-08-12 | End: 2021-08-13 | Stop reason: HOSPADM

## 2021-08-12 RX ORDER — FUROSEMIDE 10 MG/ML
40 INJECTION INTRAMUSCULAR; INTRAVENOUS ONCE
Status: COMPLETED | OUTPATIENT
Start: 2021-08-12 | End: 2021-08-12

## 2021-08-12 RX ORDER — ESCITALOPRAM OXALATE 10 MG/1
20 TABLET ORAL DAILY
Status: DISCONTINUED | OUTPATIENT
Start: 2021-08-12 | End: 2021-08-12

## 2021-08-12 RX ADMIN — MELATONIN TAB 3 MG 6 MG: 3 TAB at 09:08

## 2021-08-12 RX ADMIN — FUROSEMIDE 40 MG: 10 INJECTION, SOLUTION INTRAMUSCULAR; INTRAVENOUS at 09:08

## 2021-08-12 RX ADMIN — ENOXAPARIN SODIUM 40 MG: 40 INJECTION SUBCUTANEOUS at 04:08

## 2021-08-12 RX ADMIN — PANTOPRAZOLE SODIUM 40 MG: 40 TABLET, DELAYED RELEASE ORAL at 06:08

## 2021-08-12 RX ADMIN — MORPHINE SULFATE 2 MG: 2 INJECTION, SOLUTION INTRAMUSCULAR; INTRAVENOUS at 12:08

## 2021-08-12 RX ADMIN — MEROPENEM AND SODIUM CHLORIDE 1 G: 1 INJECTION, SOLUTION INTRAVENOUS at 09:08

## 2021-08-12 RX ADMIN — MEROPENEM AND SODIUM CHLORIDE 1 G: 1 INJECTION, SOLUTION INTRAVENOUS at 12:08

## 2021-08-12 RX ADMIN — POTASSIUM CHLORIDE 20 MEQ: 20 TABLET, EXTENDED RELEASE ORAL at 09:08

## 2021-08-12 RX ADMIN — FENOFIBRATE 48 MG: 48 TABLET, FILM COATED ORAL at 06:08

## 2021-08-12 RX ADMIN — MEROPENEM AND SODIUM CHLORIDE 1 G: 1 INJECTION, SOLUTION INTRAVENOUS at 04:08

## 2021-08-12 RX ADMIN — FLUOXETINE 10 MG: 10 CAPSULE ORAL at 12:08

## 2021-08-13 VITALS
OXYGEN SATURATION: 94 % | WEIGHT: 276.19 LBS | DIASTOLIC BLOOD PRESSURE: 70 MMHG | HEART RATE: 70 BPM | HEIGHT: 76 IN | SYSTOLIC BLOOD PRESSURE: 108 MMHG | TEMPERATURE: 98 F | BODY MASS INDEX: 33.63 KG/M2 | RESPIRATION RATE: 18 BRPM

## 2021-08-13 PROBLEM — K85.00 IDIOPATHIC ACUTE PANCREATITIS: Status: RESOLVED | Noted: 2021-08-09 | Resolved: 2021-08-13

## 2021-08-13 LAB
ALBUMIN SERPL BCP-MCNC: 3.7 G/DL (ref 3.5–5.2)
ALP SERPL-CCNC: 48 U/L (ref 55–135)
ALT SERPL W/O P-5'-P-CCNC: 19 U/L (ref 10–44)
ANION GAP SERPL CALC-SCNC: 11 MMOL/L (ref 8–16)
AST SERPL-CCNC: 23 U/L (ref 10–40)
BACTERIA UR CULT: NO GROWTH
BASOPHILS # BLD AUTO: 0.05 K/UL (ref 0–0.2)
BASOPHILS NFR BLD: 0.7 % (ref 0–1.9)
BILIRUB SERPL-MCNC: 1 MG/DL (ref 0.1–1)
BUN SERPL-MCNC: 17 MG/DL (ref 8–23)
CALCIUM SERPL-MCNC: 8.8 MG/DL (ref 8.7–10.5)
CHLORIDE SERPL-SCNC: 102 MMOL/L (ref 95–110)
CO2 SERPL-SCNC: 28 MMOL/L (ref 23–29)
CREAT SERPL-MCNC: 1.3 MG/DL (ref 0.5–1.4)
CRP SERPL-MCNC: 6.53 MG/DL
DIFFERENTIAL METHOD: ABNORMAL
EOSINOPHIL # BLD AUTO: 0.2 K/UL (ref 0–0.5)
EOSINOPHIL NFR BLD: 2.9 % (ref 0–8)
ERYTHROCYTE [DISTWIDTH] IN BLOOD BY AUTOMATED COUNT: 14.4 % (ref 11.5–14.5)
EST. GFR  (AFRICAN AMERICAN): 56.3 ML/MIN/1.73 M^2
EST. GFR  (NON AFRICAN AMERICAN): 48.7 ML/MIN/1.73 M^2
GLUCOSE SERPL-MCNC: 136 MG/DL (ref 70–110)
GLUCOSE SERPL-MCNC: 150 MG/DL (ref 70–110)
HCT VFR BLD AUTO: 47 % (ref 40–54)
HGB BLD-MCNC: 15.6 G/DL (ref 14–18)
IMM GRANULOCYTES # BLD AUTO: 0.22 K/UL (ref 0–0.04)
IMM GRANULOCYTES NFR BLD AUTO: 2.9 % (ref 0–0.5)
LYMPHOCYTES # BLD AUTO: 3.1 K/UL (ref 1–4.8)
LYMPHOCYTES NFR BLD: 41 % (ref 18–48)
MCH RBC QN AUTO: 31.1 PG (ref 27–31)
MCHC RBC AUTO-ENTMCNC: 33.2 G/DL (ref 32–36)
MCV RBC AUTO: 94 FL (ref 82–98)
MONOCYTES # BLD AUTO: 0.8 K/UL (ref 0.3–1)
MONOCYTES NFR BLD: 10.3 % (ref 4–15)
NEUTROPHILS # BLD AUTO: 3.2 K/UL (ref 1.8–7.7)
NEUTROPHILS NFR BLD: 42.2 % (ref 38–73)
NRBC BLD-RTO: 0 /100 WBC
PLATELET # BLD AUTO: 99 K/UL (ref 150–450)
PMV BLD AUTO: 11.7 FL (ref 9.2–12.9)
POTASSIUM SERPL-SCNC: 3.9 MMOL/L (ref 3.5–5.1)
PROT SERPL-MCNC: 6 G/DL (ref 6–8.4)
RBC # BLD AUTO: 5.01 M/UL (ref 4.6–6.2)
SODIUM SERPL-SCNC: 141 MMOL/L (ref 136–145)
WBC # BLD AUTO: 7.47 K/UL (ref 3.9–12.7)

## 2021-08-13 PROCEDURE — 99900035 HC TECH TIME PER 15 MIN (STAT)

## 2021-08-13 PROCEDURE — 94761 N-INVAS EAR/PLS OXIMETRY MLT: CPT

## 2021-08-13 PROCEDURE — 99232 SBSQ HOSP IP/OBS MODERATE 35: CPT | Mod: ,,, | Performed by: INTERNAL MEDICINE

## 2021-08-13 PROCEDURE — 85025 COMPLETE CBC W/AUTO DIFF WBC: CPT | Performed by: INTERNAL MEDICINE

## 2021-08-13 PROCEDURE — 27000221 HC OXYGEN, UP TO 24 HOURS

## 2021-08-13 PROCEDURE — 36415 COLL VENOUS BLD VENIPUNCTURE: CPT | Performed by: INTERNAL MEDICINE

## 2021-08-13 PROCEDURE — 99232 PR SUBSEQUENT HOSPITAL CARE,LEVL II: ICD-10-PCS | Mod: ,,, | Performed by: INTERNAL MEDICINE

## 2021-08-13 PROCEDURE — 86140 C-REACTIVE PROTEIN: CPT | Performed by: INTERNAL MEDICINE

## 2021-08-13 PROCEDURE — 25000003 PHARM REV CODE 250: Performed by: INTERNAL MEDICINE

## 2021-08-13 PROCEDURE — 99900031 HC PATIENT EDUCATION (STAT)

## 2021-08-13 PROCEDURE — 80053 COMPREHEN METABOLIC PANEL: CPT | Performed by: INTERNAL MEDICINE

## 2021-08-13 PROCEDURE — 63600175 PHARM REV CODE 636 W HCPCS: Performed by: INTERNAL MEDICINE

## 2021-08-13 RX ORDER — LEVOFLOXACIN 500 MG/1
500 TABLET, FILM COATED ORAL DAILY
Qty: 5 TABLET | Refills: 0 | Status: SHIPPED | OUTPATIENT
Start: 2021-08-13 | End: 2021-08-18

## 2021-08-13 RX ADMIN — MEROPENEM AND SODIUM CHLORIDE 1 G: 1 INJECTION, SOLUTION INTRAVENOUS at 12:08

## 2021-08-13 RX ADMIN — LEVOTHYROXINE SODIUM 112 MCG: 112 TABLET ORAL at 06:08

## 2021-08-13 RX ADMIN — FENOFIBRATE 48 MG: 48 TABLET, FILM COATED ORAL at 08:08

## 2021-08-13 RX ADMIN — MEROPENEM AND SODIUM CHLORIDE 1 G: 1 INJECTION, SOLUTION INTRAVENOUS at 08:08

## 2021-08-13 RX ADMIN — FLUOXETINE 10 MG: 10 CAPSULE ORAL at 08:08

## 2021-08-13 RX ADMIN — PANTOPRAZOLE SODIUM 40 MG: 40 TABLET, DELAYED RELEASE ORAL at 06:08

## 2021-08-16 LAB
BACTERIA BLD CULT: NORMAL
BACTERIA BLD CULT: NORMAL

## 2021-09-09 ENCOUNTER — OFFICE VISIT (OUTPATIENT)
Dept: PULMONOLOGY | Facility: CLINIC | Age: 86
End: 2021-09-09
Payer: MEDICARE

## 2021-09-09 ENCOUNTER — HOSPITAL ENCOUNTER (OUTPATIENT)
Dept: RADIOLOGY | Facility: HOSPITAL | Age: 86
Discharge: HOME | End: 2021-09-09
Attending: INTERNAL MEDICINE
Payer: MEDICARE

## 2021-09-09 VITALS
SYSTOLIC BLOOD PRESSURE: 106 MMHG | OXYGEN SATURATION: 96 % | BODY MASS INDEX: 33.47 KG/M2 | DIASTOLIC BLOOD PRESSURE: 76 MMHG | HEART RATE: 70 BPM | WEIGHT: 275 LBS

## 2021-09-09 DIAGNOSIS — R09.02 HYPOXEMIA: ICD-10-CM

## 2021-09-09 DIAGNOSIS — J96.21 ACUTE ON CHRONIC RESPIRATORY FAILURE WITH HYPOXIA: Primary | ICD-10-CM

## 2021-09-09 DIAGNOSIS — E66.9 OBESITY WITH BODY MASS INDEX 30 OR GREATER: ICD-10-CM

## 2021-09-09 DIAGNOSIS — J96.21 ACUTE ON CHRONIC RESPIRATORY FAILURE WITH HYPOXIA: ICD-10-CM

## 2021-09-09 DIAGNOSIS — Z87.09 HISTORY OF PULMONARY FIBROSIS: ICD-10-CM

## 2021-09-09 PROCEDURE — 1159F MED LIST DOCD IN RCRD: CPT | Mod: S$GLB,,, | Performed by: INTERNAL MEDICINE

## 2021-09-09 PROCEDURE — 71046 X-RAY EXAM CHEST 2 VIEWS: CPT | Mod: TC

## 2021-09-09 PROCEDURE — 1101F PR PT FALLS ASSESS DOC 0-1 FALLS W/OUT INJ PAST YR: ICD-10-PCS | Mod: S$GLB,,, | Performed by: INTERNAL MEDICINE

## 2021-09-09 PROCEDURE — 99214 PR OFFICE/OUTPT VISIT, EST, LEVL IV, 30-39 MIN: ICD-10-PCS | Mod: S$GLB,,, | Performed by: INTERNAL MEDICINE

## 2021-09-09 PROCEDURE — 1159F PR MEDICATION LIST DOCUMENTED IN MEDICAL RECORD: ICD-10-PCS | Mod: S$GLB,,, | Performed by: INTERNAL MEDICINE

## 2021-09-09 PROCEDURE — 99214 OFFICE O/P EST MOD 30 MIN: CPT | Mod: S$GLB,,, | Performed by: INTERNAL MEDICINE

## 2021-09-09 PROCEDURE — 3288F FALL RISK ASSESSMENT DOCD: CPT | Mod: S$GLB,,, | Performed by: INTERNAL MEDICINE

## 2021-09-09 PROCEDURE — 1111F DSCHRG MED/CURRENT MED MERGE: CPT | Mod: S$GLB,,, | Performed by: INTERNAL MEDICINE

## 2021-09-09 PROCEDURE — 3288F PR FALLS RISK ASSESSMENT DOCUMENTED: ICD-10-PCS | Mod: S$GLB,,, | Performed by: INTERNAL MEDICINE

## 2021-09-09 PROCEDURE — 1160F PR REVIEW ALL MEDS BY PRESCRIBER/CLIN PHARMACIST DOCUMENTED: ICD-10-PCS | Mod: S$GLB,,, | Performed by: INTERNAL MEDICINE

## 2021-09-09 PROCEDURE — 1101F PT FALLS ASSESS-DOCD LE1/YR: CPT | Mod: S$GLB,,, | Performed by: INTERNAL MEDICINE

## 2021-09-09 PROCEDURE — 1111F PR DISCHARGE MEDS RECONCILED W/ CURRENT OUTPATIENT MED LIST: ICD-10-PCS | Mod: S$GLB,,, | Performed by: INTERNAL MEDICINE

## 2021-09-09 PROCEDURE — 1160F RVW MEDS BY RX/DR IN RCRD: CPT | Mod: S$GLB,,, | Performed by: INTERNAL MEDICINE

## 2021-09-10 RX ORDER — PREDNISONE 10 MG/1
TABLET ORAL
Qty: 18 TABLET | Refills: 0 | Status: SHIPPED | OUTPATIENT
Start: 2021-09-10 | End: 2023-03-27

## 2021-09-16 ENCOUNTER — DOCUMENT SCAN (OUTPATIENT)
Dept: HOME HEALTH SERVICES | Facility: HOSPITAL | Age: 86
End: 2021-09-16

## 2021-09-19 ENCOUNTER — DOCUMENT SCAN (OUTPATIENT)
Dept: HOME HEALTH SERVICES | Facility: HOSPITAL | Age: 86
End: 2021-09-19

## 2021-12-30 ENCOUNTER — TELEPHONE (OUTPATIENT)
Dept: PULMONOLOGY | Facility: CLINIC | Age: 86
End: 2021-12-30
Payer: MEDICARE

## 2021-12-30 DIAGNOSIS — J84.10 PULMONARY FIBROSIS: Primary | ICD-10-CM

## 2021-12-30 RX ORDER — PIRFENIDONE 801 MG/1
1 TABLET, COATED ORAL 3 TIMES DAILY
Qty: 90 TABLET | Refills: 5 | Status: SHIPPED | OUTPATIENT
Start: 2021-12-30 | End: 2022-06-27

## 2022-01-24 DIAGNOSIS — T82.111A MALFUNCTION OF CARDIAC PACEMAKER, INITIAL ENCOUNTER: Primary | ICD-10-CM

## 2022-01-25 ENCOUNTER — HOSPITAL ENCOUNTER (OUTPATIENT)
Dept: RADIOLOGY | Facility: HOSPITAL | Age: 87
Discharge: HOME | End: 2022-01-25
Attending: INTERNAL MEDICINE
Payer: MEDICARE

## 2022-01-25 ENCOUNTER — HOSPITAL ENCOUNTER (OUTPATIENT)
Dept: PREADMISSION TESTING | Facility: HOSPITAL | Age: 87
Discharge: HOME | End: 2022-01-25
Attending: INTERNAL MEDICINE
Payer: MEDICARE

## 2022-01-25 VITALS — WEIGHT: 270 LBS | HEIGHT: 76 IN | BODY MASS INDEX: 32.88 KG/M2

## 2022-01-25 DIAGNOSIS — T82.111A MALFUNCTION OF CARDIAC PACEMAKER, INITIAL ENCOUNTER: ICD-10-CM

## 2022-01-25 DIAGNOSIS — Z01.810 PREOP CARDIOVASCULAR EXAM: Primary | ICD-10-CM

## 2022-01-25 LAB
ALBUMIN SERPL BCP-MCNC: 4.2 G/DL (ref 3.5–5.2)
ALP SERPL-CCNC: 68 U/L (ref 55–135)
ALT SERPL W/O P-5'-P-CCNC: 30 U/L (ref 10–44)
ANION GAP SERPL CALC-SCNC: 11 MMOL/L (ref 8–16)
APTT PPP: 27.7 SEC (ref 23.3–35.1)
AST SERPL-CCNC: 29 U/L (ref 10–40)
BASOPHILS # BLD AUTO: 0.05 K/UL (ref 0–0.2)
BASOPHILS NFR BLD: 0.6 % (ref 0–1.9)
BILIRUB SERPL-MCNC: 0.8 MG/DL (ref 0.1–1)
BUN SERPL-MCNC: 16 MG/DL (ref 8–23)
CALCIUM SERPL-MCNC: 9.2 MG/DL (ref 8.7–10.5)
CHLORIDE SERPL-SCNC: 105 MMOL/L (ref 95–110)
CO2 SERPL-SCNC: 24 MMOL/L (ref 23–29)
CREAT SERPL-MCNC: 1 MG/DL (ref 0.5–1.4)
DIFFERENTIAL METHOD: ABNORMAL
EOSINOPHIL # BLD AUTO: 0.3 K/UL (ref 0–0.5)
EOSINOPHIL NFR BLD: 3.3 % (ref 0–8)
ERYTHROCYTE [DISTWIDTH] IN BLOOD BY AUTOMATED COUNT: 13.5 % (ref 11.5–14.5)
EST. GFR  (AFRICAN AMERICAN): >60 ML/MIN/1.73 M^2
EST. GFR  (NON AFRICAN AMERICAN): >60 ML/MIN/1.73 M^2
GLUCOSE SERPL-MCNC: 154 MG/DL (ref 70–110)
HCT VFR BLD AUTO: 45.4 % (ref 40–54)
HGB BLD-MCNC: 15.1 G/DL (ref 14–18)
IMM GRANULOCYTES # BLD AUTO: 0.08 K/UL (ref 0–0.04)
IMM GRANULOCYTES NFR BLD AUTO: 0.9 % (ref 0–0.5)
INR PPP: 1.2
LYMPHOCYTES # BLD AUTO: 2.9 K/UL (ref 1–4.8)
LYMPHOCYTES NFR BLD: 32.2 % (ref 18–48)
MAGNESIUM SERPL-MCNC: 1.6 MG/DL (ref 1.6–2.6)
MCH RBC QN AUTO: 31.3 PG (ref 27–31)
MCHC RBC AUTO-ENTMCNC: 33.3 G/DL (ref 32–36)
MCV RBC AUTO: 94 FL (ref 82–98)
MONOCYTES # BLD AUTO: 1.1 K/UL (ref 0.3–1)
MONOCYTES NFR BLD: 11.8 % (ref 4–15)
MRSA SCREEN BY PCR: NEGATIVE
NEUTROPHILS # BLD AUTO: 4.6 K/UL (ref 1.8–7.7)
NEUTROPHILS NFR BLD: 51.2 % (ref 38–73)
NRBC BLD-RTO: 0 /100 WBC
PLATELET # BLD AUTO: 147 K/UL (ref 150–450)
PMV BLD AUTO: 10.6 FL (ref 9.2–12.9)
POTASSIUM SERPL-SCNC: 4.1 MMOL/L (ref 3.5–5.1)
PROT SERPL-MCNC: 6.5 G/DL (ref 6–8.4)
PROTHROMBIN TIME: 13.9 SEC (ref 11.4–13.7)
RBC # BLD AUTO: 4.82 M/UL (ref 4.6–6.2)
SODIUM SERPL-SCNC: 140 MMOL/L (ref 136–145)
WBC # BLD AUTO: 8.92 K/UL (ref 3.9–12.7)

## 2022-01-25 PROCEDURE — 80053 COMPREHEN METABOLIC PANEL: CPT | Performed by: INTERNAL MEDICINE

## 2022-01-25 PROCEDURE — 83735 ASSAY OF MAGNESIUM: CPT | Performed by: INTERNAL MEDICINE

## 2022-01-25 PROCEDURE — 71046 X-RAY EXAM CHEST 2 VIEWS: CPT | Mod: TC

## 2022-01-25 PROCEDURE — 93010 ELECTROCARDIOGRAM REPORT: CPT | Mod: GW,,, | Performed by: SPECIALIST

## 2022-01-25 PROCEDURE — 93010 EKG 12-LEAD: ICD-10-PCS | Mod: GW,,, | Performed by: SPECIALIST

## 2022-01-25 PROCEDURE — 93005 ELECTROCARDIOGRAM TRACING: CPT | Performed by: SPECIALIST

## 2022-01-25 PROCEDURE — 85610 PROTHROMBIN TIME: CPT | Performed by: INTERNAL MEDICINE

## 2022-01-25 PROCEDURE — 87641 MR-STAPH DNA AMP PROBE: CPT | Performed by: INTERNAL MEDICINE

## 2022-01-25 PROCEDURE — 85025 COMPLETE CBC W/AUTO DIFF WBC: CPT | Performed by: INTERNAL MEDICINE

## 2022-01-25 PROCEDURE — 36415 COLL VENOUS BLD VENIPUNCTURE: CPT | Performed by: INTERNAL MEDICINE

## 2022-01-25 PROCEDURE — 85730 THROMBOPLASTIN TIME PARTIAL: CPT | Performed by: INTERNAL MEDICINE

## 2022-01-25 RX ORDER — CEFAZOLIN SODIUM 1 G/3ML
2 INJECTION, POWDER, FOR SOLUTION INTRAMUSCULAR; INTRAVENOUS ONCE
Status: CANCELLED | OUTPATIENT
Start: 2022-01-25

## 2022-01-25 NOTE — DISCHARGE INSTRUCTIONS
 Nothing to eat or drink after midnight the night before your procedure.   Do not take any medications the morning of your procedure   Bring all your medications with you in the original pill bottles from pharmacy.   If you take blood thinners, ask your doctor if you should stop taking them.   Do not take metformin 24 hours prior to your procedure.   Adjust your insulin or other diabetes medications if needed.    Do your chlorhexidine wash the night before and morning of your procedure.   If you use a CPAP or BiPAP at home, please bring it with you the day of your procedure.   Make arrangements for someone you know to drive you home after your procedure. Taxi and Uber are not acceptable.

## 2022-01-26 ENCOUNTER — HOSPITAL ENCOUNTER (OUTPATIENT)
Facility: HOSPITAL | Age: 87
Discharge: HOME | End: 2022-01-26
Attending: INTERNAL MEDICINE | Admitting: INTERNAL MEDICINE
Payer: MEDICARE

## 2022-01-26 VITALS
OXYGEN SATURATION: 98 % | HEART RATE: 70 BPM | RESPIRATION RATE: 20 BRPM | SYSTOLIC BLOOD PRESSURE: 123 MMHG | DIASTOLIC BLOOD PRESSURE: 58 MMHG

## 2022-01-26 DIAGNOSIS — I49.9 ARRHYTHMIA: ICD-10-CM

## 2022-01-26 DIAGNOSIS — T82.111A MALFUNCTION OF CARDIAC PACEMAKER, INITIAL ENCOUNTER: ICD-10-CM

## 2022-01-26 LAB — SARS-COV-2 RDRP RESP QL NAA+PROBE: NEGATIVE

## 2022-01-26 PROCEDURE — 63600175 PHARM REV CODE 636 W HCPCS: Performed by: INTERNAL MEDICINE

## 2022-01-26 PROCEDURE — 93010 ELECTROCARDIOGRAM REPORT: CPT | Mod: GW,,, | Performed by: SPECIALIST

## 2022-01-26 PROCEDURE — 93005 ELECTROCARDIOGRAM TRACING: CPT | Performed by: SPECIALIST

## 2022-01-26 PROCEDURE — 99153 MOD SED SAME PHYS/QHP EA: CPT | Performed by: INTERNAL MEDICINE

## 2022-01-26 PROCEDURE — 27201423 OPTIME MED/SURG SUP & DEVICES STERILE SUPPLY: Performed by: INTERNAL MEDICINE

## 2022-01-26 PROCEDURE — 93010 EKG 12-LEAD: ICD-10-PCS | Mod: GW,,, | Performed by: SPECIALIST

## 2022-01-26 PROCEDURE — C1785 PMKR, DUAL, RATE-RESP: HCPCS | Performed by: INTERNAL MEDICINE

## 2022-01-26 PROCEDURE — 99152 MOD SED SAME PHYS/QHP 5/>YRS: CPT | Performed by: INTERNAL MEDICINE

## 2022-01-26 PROCEDURE — C1751 CATH, INF, PER/CENT/MIDLINE: HCPCS | Performed by: INTERNAL MEDICINE

## 2022-01-26 PROCEDURE — 33228 REMV&REPLC PM GEN DUAL LEAD: CPT | Performed by: INTERNAL MEDICINE

## 2022-01-26 PROCEDURE — C1894 INTRO/SHEATH, NON-LASER: HCPCS | Performed by: INTERNAL MEDICINE

## 2022-01-26 PROCEDURE — 25000003 PHARM REV CODE 250: Performed by: INTERNAL MEDICINE

## 2022-01-26 PROCEDURE — U0002 COVID-19 LAB TEST NON-CDC: HCPCS | Performed by: INTERNAL MEDICINE

## 2022-01-26 DEVICE — PACEMAKER
Type: IMPLANTABLE DEVICE | Site: CHEST | Status: FUNCTIONAL
Brand: ESSENTIO™ DR

## 2022-01-26 RX ORDER — FENTANYL CITRATE 50 UG/ML
INJECTION, SOLUTION INTRAMUSCULAR; INTRAVENOUS
Status: DISCONTINUED | OUTPATIENT
Start: 2022-01-26 | End: 2022-01-26 | Stop reason: HOSPADM

## 2022-01-26 RX ORDER — MIDAZOLAM HYDROCHLORIDE 1 MG/ML
INJECTION INTRAMUSCULAR; INTRAVENOUS
Status: DISCONTINUED | OUTPATIENT
Start: 2022-01-26 | End: 2022-01-26 | Stop reason: HOSPADM

## 2022-01-26 RX ORDER — CEFAZOLIN SODIUM 1 G/3ML
2 INJECTION, POWDER, FOR SOLUTION INTRAMUSCULAR; INTRAVENOUS ONCE
Status: DISCONTINUED | OUTPATIENT
Start: 2022-01-26 | End: 2022-01-26 | Stop reason: HOSPADM

## 2022-01-26 RX ORDER — LIDOCAINE HYDROCHLORIDE 10 MG/ML
INJECTION, SOLUTION EPIDURAL; INFILTRATION; INTRACAUDAL; PERINEURAL
Status: DISCONTINUED | OUTPATIENT
Start: 2022-01-26 | End: 2022-01-26 | Stop reason: HOSPADM

## 2022-01-26 RX ORDER — SODIUM CHLORIDE 450 MG/100ML
INJECTION, SOLUTION INTRAVENOUS CONTINUOUS
Status: DISCONTINUED | OUTPATIENT
Start: 2022-01-26 | End: 2022-01-26 | Stop reason: HOSPADM

## 2022-01-26 RX ADMIN — SODIUM CHLORIDE: 0.45 INJECTION, SOLUTION INTRAVENOUS at 06:01

## 2022-01-26 NOTE — Clinical Note
The site was marked. The right groin and chest was prepped. The site was prepped with ChloraPrep. The site was clipped. The patient was draped. The patient was positioned supine. The patient was secured using safety straps.

## 2022-01-26 NOTE — DISCHARGE INSTRUCTIONS
.Post Pacemaker Discharge Instructions:   DO NOT remove the flexan dressing that is covering the pacemaker incision until instructed by  your physician in your follow up appointment (usually around 10 days).   Inspect the site daily for tenderness, discharge, or signs of infection.   Keep dressing dry and intact   Do not apply powders or lotions to incision site until healed   Avoid stress to the incision/suture site. Avoid any kind of trauma to the pacemaker site.   Check your pulse daily and notify your physician if the rate is less than the pacemaker set rate.    Always carry your pacemaker ID card with you. A permanent card will be mailed to you in 6-8 weeks.    Avoid areas of high voltages such as power plants, radio transmitters, or welding equipment. You may walk through anti-theft doorways, but do not stand near them for any length of time.    Inform all Physicians and Dentists that you have a pacemaker.    Your pacemaker will need to be checked for proper functioning at intervals determined by your physician. It is very important to keep these appointments or your pacemaker may not function properly.     Activity:   Limit your activity for the next 48 hours. Avoid excessive bending or squatting.    You may use your arms but avoid reaching overhead for 6 weeks with the arm on the same side as the pacemaker.   Do not lift anything grater than 5 pounds for one month with the arm on the same side as your pacemaker.   No driving until instructed by you physician at your follow up appointment.     CALL YOUR DOCTOR IMMEDIATELY IF YOU EXPERIENCE ANY OF THE FOLLOWING:   Chest pain, palpitations, shortness of breath, excessive dizziness, fainting, nausea or vomiting.    Signs of infection including: swelling, discharge, redness, warmth , pain, fever

## 2022-01-26 NOTE — Clinical Note
The lead thresholds were tested and was connected to generator.      The chronic RA and RV lead was connected to a replacement chronic generator.

## 2022-01-26 NOTE — Clinical Note
There was an existing generator. The generator was removed. The leads were disconnected. The generator was returned to . The generator was returned due to ODELL/EOL

## 2023-02-13 ENCOUNTER — OFFICE VISIT (OUTPATIENT)
Dept: PULMONOLOGY | Facility: CLINIC | Age: 88
End: 2023-02-13
Payer: MEDICARE

## 2023-02-13 VITALS
OXYGEN SATURATION: 95 % | DIASTOLIC BLOOD PRESSURE: 80 MMHG | SYSTOLIC BLOOD PRESSURE: 132 MMHG | HEART RATE: 74 BPM | BODY MASS INDEX: 27.39 KG/M2 | WEIGHT: 225 LBS

## 2023-02-13 DIAGNOSIS — I50.9 CONGESTIVE HEART FAILURE, UNSPECIFIED HF CHRONICITY, UNSPECIFIED HEART FAILURE TYPE: ICD-10-CM

## 2023-02-13 DIAGNOSIS — J96.11 CHRONIC RESPIRATORY FAILURE WITH HYPOXIA: ICD-10-CM

## 2023-02-13 DIAGNOSIS — Z87.09 HISTORY OF PULMONARY FIBROSIS: ICD-10-CM

## 2023-02-13 PROCEDURE — 1125F AMNT PAIN NOTED PAIN PRSNT: CPT | Mod: CPTII,S$GLB,, | Performed by: INTERNAL MEDICINE

## 2023-02-13 PROCEDURE — 1159F PR MEDICATION LIST DOCUMENTED IN MEDICAL RECORD: ICD-10-PCS | Mod: CPTII,S$GLB,, | Performed by: INTERNAL MEDICINE

## 2023-02-13 PROCEDURE — 1101F PT FALLS ASSESS-DOCD LE1/YR: CPT | Mod: CPTII,S$GLB,, | Performed by: INTERNAL MEDICINE

## 2023-02-13 PROCEDURE — 1101F PR PT FALLS ASSESS DOC 0-1 FALLS W/OUT INJ PAST YR: ICD-10-PCS | Mod: CPTII,S$GLB,, | Performed by: INTERNAL MEDICINE

## 2023-02-13 PROCEDURE — 1159F MED LIST DOCD IN RCRD: CPT | Mod: CPTII,S$GLB,, | Performed by: INTERNAL MEDICINE

## 2023-02-13 PROCEDURE — 1125F PR PAIN SEVERITY QUANTIFIED, PAIN PRESENT: ICD-10-PCS | Mod: CPTII,S$GLB,, | Performed by: INTERNAL MEDICINE

## 2023-02-13 PROCEDURE — 3288F FALL RISK ASSESSMENT DOCD: CPT | Mod: CPTII,S$GLB,, | Performed by: INTERNAL MEDICINE

## 2023-02-13 PROCEDURE — 99214 PR OFFICE/OUTPT VISIT, EST, LEVL IV, 30-39 MIN: ICD-10-PCS | Mod: GV,S$GLB,, | Performed by: INTERNAL MEDICINE

## 2023-02-13 PROCEDURE — 99214 OFFICE O/P EST MOD 30 MIN: CPT | Mod: GV,S$GLB,, | Performed by: INTERNAL MEDICINE

## 2023-02-13 PROCEDURE — 3288F PR FALLS RISK ASSESSMENT DOCUMENTED: ICD-10-PCS | Mod: CPTII,S$GLB,, | Performed by: INTERNAL MEDICINE

## 2023-02-13 PROCEDURE — 1160F RVW MEDS BY RX/DR IN RCRD: CPT | Mod: CPTII,S$GLB,, | Performed by: INTERNAL MEDICINE

## 2023-02-13 PROCEDURE — 1160F PR REVIEW ALL MEDS BY PRESCRIBER/CLIN PHARMACIST DOCUMENTED: ICD-10-PCS | Mod: CPTII,S$GLB,, | Performed by: INTERNAL MEDICINE

## 2023-02-13 RX ORDER — LORAZEPAM 0.5 MG/1
TABLET ORAL
COMMUNITY

## 2023-02-13 RX ORDER — CLOTRIMAZOLE 1 %
CREAM (GRAM) TOPICAL
COMMUNITY

## 2023-02-13 RX ORDER — FLUCONAZOLE 100 MG/1
TABLET ORAL
COMMUNITY
End: 2023-03-27

## 2023-02-13 RX ORDER — DEXAMETHASONE 2 MG/1
2 TABLET ORAL EVERY MORNING
COMMUNITY
Start: 2022-12-08 | End: 2023-03-27

## 2023-02-13 RX ORDER — PANTOPRAZOLE SODIUM 40 MG/1
TABLET, DELAYED RELEASE ORAL
COMMUNITY
End: 2023-03-27

## 2023-02-13 RX ORDER — AMOXICILLIN 500 MG/1
CAPSULE ORAL
COMMUNITY
Start: 2022-12-13 | End: 2023-03-27

## 2023-02-13 RX ORDER — VALACYCLOVIR HYDROCHLORIDE 1 G/1
1000 TABLET, FILM COATED ORAL
COMMUNITY
End: 2023-03-27

## 2023-02-13 RX ORDER — CLINDAMYCIN HYDROCHLORIDE 150 MG/1
CAPSULE ORAL
COMMUNITY
End: 2023-03-27

## 2023-02-13 RX ORDER — TRAMADOL HYDROCHLORIDE 50 MG/1
TABLET ORAL
COMMUNITY
Start: 2022-11-03

## 2023-02-13 RX ORDER — ONDANSETRON 8 MG/1
TABLET, ORALLY DISINTEGRATING ORAL
COMMUNITY

## 2023-02-13 RX ORDER — BUPROPION HYDROCHLORIDE 150 MG/1
TABLET, EXTENDED RELEASE ORAL
COMMUNITY
End: 2023-03-27

## 2023-02-13 RX ORDER — DICYCLOMINE HYDROCHLORIDE 10 MG/1
CAPSULE ORAL
COMMUNITY
End: 2023-03-27

## 2023-02-13 RX ORDER — MUPIROCIN 20 MG/G
OINTMENT TOPICAL
COMMUNITY
End: 2023-03-27

## 2023-02-13 NOTE — PROGRESS NOTES
Office Visit Note *    Patient Name: Manuel Casas  MRN: [id number]  : 3/23/1933      Reason for visit: To transfer care, IPF/UIP, sleep apnea, chronic hypoxemia    HPI:     2020 - Pt with h/o IPF/UIP - diagnosed about 3-4 years ago initially on OFEV (had GI side effects), changed to ESBRIET and has no issues with it.  He has been stable (Dr Wade's last note has been reviewed PFT - - TLC -73%, DLCO 80%).   About 2 weeks ago he was sick with respiratory issues - was seen at Urgent Care and was treated with zithromax and feels better now.  He does feel that since stopping zithromax about 1 week ago he has increased SOB, no increased congestion but has had some increased cough.    2020 - Here for follow up after hospitalization for acinetobacter pneumonia and sepsis.  He is doing well and feels that he is doing better with high flow concentrator.  We discussed looking into a NIPPV.  He is interested in establishing with a cardiology group here (I have recommended Dr Stahl, et al).  He is working with PT at home and is progressing.  Patient has no known corona virus exposures and has been practicing social distancing.  We have discussed the virus and precautions and all questions have been answered.    10/14/2020 - Here for follow up, breathing is back at baseline and no new issues.  He reports that he had a syncopal episode after a prolonged hot shower (we discussed this).  He has seen Dr Stahl for cardiology and is to get pacer checked and may need a battery changed soon.  He is working on increasing activity.  Will give flu shot.  There were financial issues with NIPPV so he is using CPAP.    2021 - Here for follow up, overall has been stable but does have good days and bad days.  Using CPAP because of financial issues with NIPPV.  No new issues.  He has been started on statins for increased lipids.  Patient has no known corona virus exposures and has been practicing social distancing.   We have discussed the virus and precautions and all questions have been answered.    9/9/2021 - Here for follow up, has noted some increased SO over the last week or so.  No fever, chills, sweats, cough, sputum.  Saturations have been OK.  No CP of any type.  He denies any increase in CHF symptoms (no increase in edema).  He reports that he was hypertensive last week (SBP in 200's).  Repeat P 106 systolic (? Measuring error)    2/13/2023 - Here for follow up (late), he feels that over the last 6 months he has declined and has worsened AYON.  Reports that sats are OK on 6 LPM but worsens with exertion.  Has not noted increased edema or CHF.  Physically he is not doing at well at home.  He has signed up with hospice.  Has liquid morphine and has been using up to 5 mg/d but not daily yet.  On Esbriet 3 times a day and notes no problems.        NIPPV Order    Ordering noninvasive positive pressure volume ventilator for  PRN use to reduce the risk of exacerbation and possible hospitalization.  Home BiPAP in sufficient due to the severity of the condition.  Pulmonary fibrosis is the cause of the chronic respiratory failure/hypercapnea.    Home Oxygen  6 LPM    Face to face visit with pt regarding their home oxygen.  We have discussed the need for oxygen including continuous use, prn use or night time use (as appropriate for the pt).  We discussed the need for compliance with the therapy. We will do recertifications as necessary.       Past Medical History    Past Medical History:   Diagnosis Date    Atrial flutter     Cancer     prostate cancer, squamous cell ca left hand    Congestive heart failure     Coronary artery disease     Diabetes mellitus, type 2     Heart failure     right sided    Hyperlipidemia     Pulmonary fibrosis     Sleep apnea        Past Surgical History    Past Surgical History:   Procedure Laterality Date    ADENOIDECTOMY      CARDIAC PACEMAKER PLACEMENT      HERNIA REPAIR      left, umbilical     REPLACEMENT OF IMPLANTABLE CARDIOVERTER-DEFIBRILLATOR (ICD) GENERATOR N/A 1/26/2022    Procedure: REPLACEMENT, PULSE GENERATOR, ICD;  Surgeon: Moises Umaña MD;  Location: [facility] CATH/EP LAB;  Service: Cardiology;  Laterality: N/A;    TONSILLECTOMY         Medications      Current Outpatient Medications:     ACCU-CHEK SMARTVIEW TEST STRIP Strp, , Disp: , Rfl:     bicalutamide (CASODEX) 50 MG Tab, Take 50 mg by mouth once daily., Disp: , Rfl:     buPROPion (WELLBUTRIN SR) 150 MG TBSR 12 hr tablet, bupropion HCl  mg tablet,12 hr sustained-release  TAKE 1 TABLET BY MOUTH ONCE DAILY FOR 3 DAYS AND THEN MAY INCREASE TO TWICE DAILY, Disp: , Rfl:     cetirizine (ZYRTEC) 10 MG tablet, Take 10 mg by mouth once daily., Disp: , Rfl:     cholecalciferol, vitamin D3, (VITAMIN D3) 50 mcg (2,000 unit) Cap, Take 1 capsule by mouth once daily., Disp: , Rfl:     clindamycin (CLEOCIN) 150 MG capsule, clindamycin HCl 150 mg capsule  TAKE ONE CAPSULE BY MOUTH EVERY 8 HOURS UNTIL ALL TAKEN, Disp: , Rfl:     clotrimazole (LOTRIMIN) 1 % cream, clotrimazole 1 % topical cream  APPPLY TO THE AFFECTED AREA TWICE DAILY, Disp: , Rfl:     dexAMETHasone (DECADRON) 2 MG tablet, Take 2 mg by mouth every morning., Disp: , Rfl:     diclofenac sodium (VOLTAREN) 1 % Gel, Apply 1 Tube topically daily as needed. Not using, Disp: , Rfl:     dicyclomine (BENTYL) 10 MG capsule, dicyclomine 10 mg capsule  TAKE ONE CAPSULE BY MOUTH THREE TIMES A DAY, Disp: , Rfl:     diphenhydrAMINE (BENADRYL) 50 MG capsule, Take 50 mg by mouth nightly., Disp: , Rfl:     ELIQUIS 5 mg Tab, Take 1 tablet (5 mg total) by mouth 2 (two) times daily., Disp:  , Rfl:     fluconazole (DIFLUCAN) 100 MG tablet, fluconazole 100 mg tablet  Take 1 tablet every day by oral route for 10 days., Disp: , Rfl:     FLUoxetine 10 MG capsule, Take 10 mg by mouth once daily. For 1 week then may increase to 2 caps qd, Disp: , Rfl:     furosemide (LASIX) 40 MG tablet, Take 40 mg by mouth every  "other day. , Disp: , Rfl:     levothyroxine (SYNTHROID) 112 MCG tablet, Take 112 mcg by mouth before breakfast., Disp: , Rfl:     LORazepam (ATIVAN) 0.5 MG tablet, lorazepam 0.5 mg tablet  TAKE 1 TABLET BY MOUTH 1 HOUR BEFORE SURGERY, Disp: , Rfl:     mupirocin (BACTROBAN) 2 % ointment, mupirocin 2 % topical ointment  APPLY TO AFFECTED AREA WITH DRESSING CHANGE, Disp: , Rfl:     NOVOLIN N NPH U-100 INSULIN 100 unit/mL injection, Inject 30 Units into the skin 2 (two) times daily. , Disp: , Rfl: 5    ondansetron (ZOFRAN-ODT) 8 MG TbDL, ondansetron 8 mg disintegrating tablet  DISSOLVE 1 TABLET ON THE TONGUE THREE TIMES A DAY, Disp: , Rfl:     pantoprazole (PROTONIX) 40 MG tablet, pantoprazole 40 mg tablet,delayed release  TAKE ONE TABLET BY MOUTH ONCE DAILY, Disp: , Rfl:     pirfenidone (ESBRIET) 801 mg Tab, TAKE 1 TABLET (801MG) BY MOUTH 3 TIMES A DAY, Disp: 90 tablet, Rfl: 5    potassium chloride (KLOR-CON) 10 MEQ TbSR, Take 10 mEq by mouth every other day. , Disp: , Rfl: 11    predniSONE (DELTASONE) 10 MG tablet, Take 3 a day x 3 days then 2 a day x 3 days then 1 a day x 3 days, Disp: 18 tablet, Rfl: 0    rosuvastatin (CRESTOR) 40 MG Tab, Take 10 mg by mouth every evening., Disp: , Rfl:     traMADoL (ULTRAM) 50 mg tablet, Take by mouth., Disp: , Rfl:     ubidecarenone (CO Q-10 ORAL), Take 1 capsule by mouth once daily., Disp: , Rfl:     ULTRA COMFORT INSULIN SYRINGE 1 mL 29 gauge x 1/2" Syrg, , Disp: , Rfl:     valACYclovir (VALTREX) 1000 MG tablet, Take 1,000 mg by mouth., Disp: , Rfl:     amoxicillin (AMOXIL) 500 MG capsule, Take by mouth., Disp: , Rfl:     Allergies    Review of patient's allergies indicates:  No Known Allergies    SocHx    Social History     Tobacco Use   Smoking Status Former    Types: Cigarettes    Quit date: 1970    Years since quittin.1   Smokeless Tobacco Never   about 30 pack years, quit about 50 years ago    Social History     Substance and Sexual Activity   Alcohol Use Not Currently "    Comment: none for 6 months, occasional red wine       Drug Use - no  Occupation - retired urologist  Asbestos exposure - no  Pets - cat    FMHx    Family History   Problem Relation Age of Onset    Heart disease Mother     Diabetes Mother     Hypertension Mother          Review of Systems  Review of Systems   Constitutional:  Negative for chills, diaphoresis, fever, malaise/fatigue and weight loss.   HENT:  Negative for congestion.    Eyes:  Negative for pain.   Respiratory:  Positive for cough and shortness of breath. Negative for hemoptysis, sputum production, wheezing and stridor.    Cardiovascular:  Negative for chest pain, palpitations, orthopnea, claudication, leg swelling and PND.   Gastrointestinal:  Negative for abdominal pain, blood in stool, constipation, diarrhea, heartburn, nausea and vomiting.   Genitourinary:  Negative for dysuria, frequency, hematuria and urgency.   Musculoskeletal:  Negative for back pain, falls, joint pain, myalgias and neck pain.   Skin:  Negative for itching and rash.   Neurological:  Negative for dizziness, tingling, tremors, sensory change, speech change, focal weakness, seizures, loss of consciousness, weakness and headaches.   Endo/Heme/Allergies:  Negative for environmental allergies.   Psychiatric/Behavioral:  Negative for depression, substance abuse and suicidal ideas. The patient is not nervous/anxious.      Physical Exam    Vitals:    02/13/23 1251   BP: 132/80   BP Location: Left arm   Patient Position: Sitting   BP Method: Medium (Manual)   Pulse: 74   SpO2: 95%   Weight: 102.1 kg (225 lb)         Physical Exam  Vitals and nursing note reviewed.   Constitutional:       General: He is not in acute distress.     Appearance: He is well-developed. He is obese. He is not ill-appearing, toxic-appearing or diaphoretic.      Comments: Wearing O2 and mask   HENT:      Head: Normocephalic and atraumatic.      Right Ear: External ear normal.      Left Ear: External ear normal.       Nose: Nose normal.   Eyes:      General: No scleral icterus.        Right eye: No discharge.         Left eye: No discharge.      Extraocular Movements: Extraocular movements intact.      Conjunctiva/sclera: Conjunctivae normal.      Pupils: Pupils are equal, round, and reactive to light.   Neck:      Thyroid: No thyromegaly.      Vascular: No carotid bruit or JVD.      Trachea: No tracheal deviation.   Cardiovascular:      Rate and Rhythm: Normal rate and regular rhythm.      Pulses: Normal pulses.      Heart sounds: Normal heart sounds. No murmur heard.    No friction rub. No gallop.   Pulmonary:      Effort: Pulmonary effort is normal. No respiratory distress.      Breath sounds: No stridor. Rales present. No wheezing or rhonchi.   Chest:      Chest wall: No tenderness.   Abdominal:      General: Bowel sounds are normal. There is no distension.      Palpations: Abdomen is soft.      Tenderness: There is no abdominal tenderness. There is no guarding.      Comments: obese   Musculoskeletal:         General: No swelling, tenderness or deformity. Normal range of motion.      Cervical back: Normal range of motion and neck supple. No rigidity. No muscular tenderness.      Right lower leg: Edema (trace) present.      Left lower leg: Edema (trace) present.   Lymphadenopathy:      Cervical: No cervical adenopathy.   Skin:     General: Skin is warm and dry.      Coloration: Skin is not jaundiced.      Findings: No bruising.   Neurological:      General: No focal deficit present.      Mental Status: He is alert and oriented to person, place, and time. Mental status is at baseline.      Cranial Nerves: No cranial nerve deficit.      Deep Tendon Reflexes: Reflexes are normal and symmetric.      Comments: In wheelchair   Psychiatric:         Mood and Affect: Mood normal.         Behavior: Behavior normal.         Thought Content: Thought content normal.         Judgment: Judgment normal.       Labs    Lab Results    Component Value Date    WBC 8.92 01/25/2022    HGB 15.1 01/25/2022    HCT 45.4 01/25/2022     (L) 01/25/2022       Sodium   Date Value Ref Range Status   01/25/2022 140 136 - 145 mmol/L Final     Potassium   Date Value Ref Range Status   01/25/2022 4.1 3.5 - 5.1 mmol/L Final     Chloride   Date Value Ref Range Status   01/25/2022 105 95 - 110 mmol/L Final     CO2   Date Value Ref Range Status   01/25/2022 24 23 - 29 mmol/L Final     Glucose   Date Value Ref Range Status   01/25/2022 154 (H) 70 - 110 mg/dL Final     BUN   Date Value Ref Range Status   01/25/2022 16 8 - 23 mg/dL Final     Creatinine   Date Value Ref Range Status   01/25/2022 1.0 0.5 - 1.4 mg/dL Final     Calcium   Date Value Ref Range Status   01/25/2022 9.2 8.7 - 10.5 mg/dL Final     Total Protein   Date Value Ref Range Status   01/25/2022 6.5 6.0 - 8.4 g/dL Final     Albumin   Date Value Ref Range Status   01/25/2022 4.2 3.5 - 5.2 g/dL Final     Total Bilirubin   Date Value Ref Range Status   01/25/2022 0.8 0.1 - 1.0 mg/dL Final     Comment:     For infants and newborns, interpretation of results should be based  on gestational age, weight and in agreement with clinical  observations.    Premature Infant recommended reference ranges:  Up to 24 hours.............<8.0 mg/dL  Up to 48 hours............<12.0 mg/dL  3-5 days..................<15.0 mg/dL  6-29 days.................<15.0 mg/dL       Alkaline Phosphatase   Date Value Ref Range Status   01/25/2022 68 55 - 135 U/L Final     AST   Date Value Ref Range Status   01/25/2022 29 10 - 40 U/L Final     ALT   Date Value Ref Range Status   01/25/2022 30 10 - 44 U/L Final     Anion Gap   Date Value Ref Range Status   01/25/2022 11 8 - 16 mmol/L Final       Xrays        Impression/Plan    Problem List Items Addressed This Visit          Pulmonary    History of pulmonary fibrosis     Now with hospice  We discussed possible PT at home but he doesn't want at this time  Will have virtual visit in 6  months  May need to consider fentanyl patch for dyspnea/air hunger         Respiratory failure with hypoxia     Continue with O2            Cardiac/Vascular    Congestive heart failure     Seems compensated at this time                      Timo Ambrocio MD

## 2023-02-13 NOTE — ASSESSMENT & PLAN NOTE
· Now with hospice  · We discussed possible PT at home but he doesn't want at this time  · Will have virtual visit in 6 months  · May need to consider fentanyl patch for dyspnea/air hunger

## 2023-03-23 ENCOUNTER — TELEPHONE (OUTPATIENT)
Dept: OTOLARYNGOLOGY | Facility: CLINIC | Age: 88
End: 2023-03-23
Payer: MEDICARE

## 2023-03-23 NOTE — TELEPHONE ENCOUNTER
Called wife back. 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 Advised that I can get pt in with Dr. Bonds on Monday if he would like. Wife states that she may take pt to Urgent Care tomorrow. Will schedule for Monday if needs follow up. Thanks, Jess

## 2023-03-23 NOTE — TELEPHONE ENCOUNTER
----- Message from Tabatha Bynum sent at 3/23/2023 11:56 AM CDT -----  Contact: Pt's wife/ Hannah  Type:  Sooner Apoointment Request    Caller is requesting a sooner appointment.  Caller declined first available appointment listed below.  Caller will not accept being placed on the waitlist and is requesting a message be sent to doctor.  Name of Caller:Pt's wife/ Hannah  When is the first available appointment? 03/30/2023  Symptoms:Pressure and pain in left ear  Would the patient rather a call back or a response via [facility]? call  Best Call Back Number:[phone]  Additional Information: Pt's wife is requesting a sooner appt. Please call pt's wife to schedule.

## 2023-03-27 ENCOUNTER — OFFICE VISIT (OUTPATIENT)
Dept: OTOLARYNGOLOGY | Facility: CLINIC | Age: 88
End: 2023-03-27
Payer: MEDICARE

## 2023-03-27 VITALS
HEIGHT: 76 IN | DIASTOLIC BLOOD PRESSURE: 74 MMHG | SYSTOLIC BLOOD PRESSURE: 127 MMHG | WEIGHT: 283.06 LBS | HEART RATE: 76 BPM | BODY MASS INDEX: 34.47 KG/M2

## 2023-03-27 DIAGNOSIS — H92.21 OTORRHAGIA OF RIGHT EAR: ICD-10-CM

## 2023-03-27 DIAGNOSIS — J31.0 RHINITIS SICCA: Primary | ICD-10-CM

## 2023-03-27 DIAGNOSIS — Z92.89 HISTORY OF USE OF HEARING AID: ICD-10-CM

## 2023-03-27 DIAGNOSIS — Z99.81 OXYGEN DEPENDENT: ICD-10-CM

## 2023-03-27 DIAGNOSIS — H61.22 HEARING LOSS DUE TO CERUMEN IMPACTION, LEFT: ICD-10-CM

## 2023-03-27 PROCEDURE — 69210 EAR CERUMEN REMOVAL: ICD-10-PCS | Mod: S$GLB,,, | Performed by: OTOLARYNGOLOGY

## 2023-03-27 PROCEDURE — 69210 REMOVE IMPACTED EAR WAX UNI: CPT | Mod: S$GLB,,, | Performed by: OTOLARYNGOLOGY

## 2023-03-27 PROCEDURE — 99999 PR PBB SHADOW E&M-EST. PATIENT-LVL IV: ICD-10-PCS | Mod: PBBFAC,,, | Performed by: OTOLARYNGOLOGY

## 2023-03-27 PROCEDURE — 3288F PR FALLS RISK ASSESSMENT DOCUMENTED: ICD-10-PCS | Mod: CPTII,S$GLB,, | Performed by: OTOLARYNGOLOGY

## 2023-03-27 PROCEDURE — 3288F FALL RISK ASSESSMENT DOCD: CPT | Mod: CPTII,S$GLB,, | Performed by: OTOLARYNGOLOGY

## 2023-03-27 PROCEDURE — 99204 PR OFFICE/OUTPT VISIT, NEW, LEVL IV, 45-59 MIN: ICD-10-PCS | Mod: 25,S$GLB,, | Performed by: OTOLARYNGOLOGY

## 2023-03-27 PROCEDURE — 1125F PR PAIN SEVERITY QUANTIFIED, PAIN PRESENT: ICD-10-PCS | Mod: CPTII,S$GLB,, | Performed by: OTOLARYNGOLOGY

## 2023-03-27 PROCEDURE — 1159F PR MEDICATION LIST DOCUMENTED IN MEDICAL RECORD: ICD-10-PCS | Mod: CPTII,S$GLB,, | Performed by: OTOLARYNGOLOGY

## 2023-03-27 PROCEDURE — 1125F AMNT PAIN NOTED PAIN PRSNT: CPT | Mod: CPTII,S$GLB,, | Performed by: OTOLARYNGOLOGY

## 2023-03-27 PROCEDURE — 99204 OFFICE O/P NEW MOD 45 MIN: CPT | Mod: 25,S$GLB,, | Performed by: OTOLARYNGOLOGY

## 2023-03-27 PROCEDURE — 1159F MED LIST DOCD IN RCRD: CPT | Mod: CPTII,S$GLB,, | Performed by: OTOLARYNGOLOGY

## 2023-03-27 PROCEDURE — 99999 PR PBB SHADOW E&M-EST. PATIENT-LVL IV: CPT | Mod: PBBFAC,,, | Performed by: OTOLARYNGOLOGY

## 2023-03-27 PROCEDURE — 1101F PT FALLS ASSESS-DOCD LE1/YR: CPT | Mod: CPTII,S$GLB,, | Performed by: OTOLARYNGOLOGY

## 2023-03-27 PROCEDURE — 1101F PR PT FALLS ASSESS DOC 0-1 FALLS W/OUT INJ PAST YR: ICD-10-PCS | Mod: CPTII,S$GLB,, | Performed by: OTOLARYNGOLOGY

## 2023-03-27 RX ORDER — MUPIROCIN 20 MG/G
OINTMENT TOPICAL 3 TIMES DAILY
Qty: 22 G | Refills: 0 | Status: SHIPPED | OUTPATIENT
Start: 2023-03-27 | End: 2023-04-03

## 2023-03-27 RX ORDER — LEVOTHYROXINE SODIUM 125 UG/1
125 TABLET ORAL EVERY MORNING
COMMUNITY
Start: 2022-12-14

## 2023-03-27 NOTE — PROCEDURES
"Ear Cerumen Removal    Date/Time: 3/27/2023 1:20 PM  Performed by: Zaki Bonds MD  Authorized by: Zaki Bonds MD     Time out: Immediately prior to procedure a "time out" was called to verify the correct patient, procedure, equipment, support staff and site/side marked as required.    Consent Done?:  Yes (Verbal)    Local anesthetic:  None  Location details:  Both ears  Procedure type: curette    Cerumen  Removal Results:  Cerumen completely removed  Patient tolerance:  Patient tolerated the procedure well with no immediate complications       Deep hard wax on the left and hard wax and scab on the right cleared with a combination of suction, curette and Micro alligators using the operating microscope without trauma.  A small layer of gold wax Oats covering the posterior aspect of the tympanic membrane but the visible portion of the membrane is normal without appreciable effusion.  Right tympanic membrane middle ear space normal.  Hearing subjectively improved after left disimpaction.  "

## 2023-03-27 NOTE — PROGRESS NOTES
[facility]    Subjective:      Patient: Manuel Casas Patient PCP: Michaela Perry MD         :  3/23/1933     Sex:  male      MRN:  [id number]          Date of Visit: 2023      Chief Complaint: Cerumen Impaction (Bilaterally. States that he went to [facility] because felt that his left hearing aid was not working properly. States that his left ear seemed to have decreased hearing over the last month. States was told hearing aid was dirty, but also that he has wax in both ears that needs to be cleaned. Pt is deaf in right ear (had sudden hearing loss about 8 yrs ago). Wears hearing aids bilaterally-the right one sends info to the left.  ) and Sinus Problem (Also states has bloody purulent drainage from nose (pt on oxygen due to pulmonary fibrosis). States has been having this for about 2 weeks. )      Patient ID: Manuel Casas is a 89 y.o. male former smoker with A complex medical history including diabetes, atrial flutter status post pacemaker and congestive heart failure as well as pulmonary fibrosis with hypoxemia and hypothyroidism self-referred for  ear discomfort and wax impaction with dysfunction of hearing aids referred by [facility].  History of sudden hearing loss on the right side 8 years ago wearing what sounds like a Bi-cross hearing aid set up.  Also complains separately about bloody purulent drainage from the nose associated with oxygen for pulmonary fibrosis/hypoxemia.  Bothersome /worsening for the last 2 weeks without associated facial pressure or pain or fever.    Review of Systems     Past Medical History  He has a past medical history of Atrial flutter, Cancer, Congestive heart failure, Coronary artery disease, Diabetes mellitus, type 2, Heart failure, Hyperlipidemia, Pulmonary fibrosis, and Sleep apnea.    Family / Surgical / Social History  His family history includes Diabetes in his mother; Heart disease in his mother; Hypertension in his mother.    Past Surgical  History:   Procedure Laterality Date    ADENOIDECTOMY      CARDIAC PACEMAKER PLACEMENT      HERNIA REPAIR      left, umbilical    REPLACEMENT OF IMPLANTABLE CARDIOVERTER-DEFIBRILLATOR (ICD) GENERATOR N/A 2022    Procedure: REPLACEMENT, PULSE GENERATOR, ICD;  Surgeon: Moises Umaña MD;  Location: [facility] CATH/EP LAB;  Service: Cardiology;  Laterality: N/A;    TONSILLECTOMY         Social History     Tobacco Use    Smoking status: Former     Types: Cigarettes     Quit date: 1970     Years since quittin.2    Smokeless tobacco: Never   Substance and Sexual Activity    Alcohol use: Not Currently     Comment: none for 6 months, occasional red wine    Drug use: Never    Sexual activity: Not Currently       Medications  He has a current medication list which includes the following prescription(s): accu-chek smartview test strip, bicalutamide, cetirizine, cholecalciferol (vitamin d3), clotrimazole, eliquis, fluoxetine, furosemide, levothyroxine, lorazepam, novolin n nph u-100 insulin, ondansetron, pirfenidone, potassium chloride, rosuvastatin, tramadol, ubidecarenone, and ultra comfort insulin syringe.      Allergies  Review of patient's allergies indicates:  No Known Allergies    All medications, allergies, and past history have been reviewed.    Objective:      Vitals:  Vitals - 1 value per visit 2023 3/27/2023 3/27/2023   SYSTOLIC 132 - 127   DIASTOLIC 80 - 74   Pulse 74 - 76   Temp - - -   Resp - - -   SPO2 95 - -   Weight (lb) 225 - 283.07   Weight (kg) 102.059 - 128.4   Height - - 76   BMI (Calculated) - - 34.5   VISIT REPORT - - -   Pain Score  - 2 -       Body surface area is 2.62 meters squared.    Physical Exam:    GENERAL  APPEARANCE -  alert, appears stated age, cooperative, and mildly obese  BARRIER(S) TO COMMUNICATION -  hearing loss; MUCH improved after left disimpaction VOICE - appropriate for age and gender    INTEGUMENTARY  no suspicious head and neck lesions    HEENT  HEAD: Normocephalic,  without obvious abnormality, atraumatic  FACE: INSPECTION - Symmetric, no signs of trauma, no suspicious lesion(s)  PALPATION -  No masses SALIVARY GLANDS - non-tender with no appreciable mass  STRENGTH - facial symmetry  NECK/THYROID: normal atraumatic, no neck masses, normal thyroid, no jvd    EYES  Normal occular alignment and mobility with no visible nystagmus at rest    EARS/NOSE/MOUTH/THROAT  EARS  PINNAE AND EXTERNAL EARS - no suspicious lesion OTOSCOPIC EXAM (surgical microscopy was used for visualization/instrumentation): EAR EXAM -   Wax impactions cleared bilaterally.  See procedure note.  No bleeding no trauma.  Mild excessive wax on the left tympanic membrane but otherwise normal.  HEARING -   subjectively absent on the right much improved on the left    NOSE AND SINUSES  EXTERNAL NOSE - Grossly normal for age/sex  SEPTUM -  crusting left-greater-than-right no active bleeding minimal scabbing at the inferior spur area TURBINATES - within normal limits MUCOSA - within normal limits     MOUTH AND THROAT   ORAL CAVITY, LIPS, TEETH, GUMS & TONGUE - moist, no suspicious lesions  OROPHARYNX /TONSILS/PHARYNGEAL WALLS/HYPOPHARYNX - no erythema or exudates  NASOPHARYNX - limited mirror exam - unable to visualize due to anatomy/gag  LARYNX -  - limited mirror exam - unable to visualize due to anatomy/gag      CHEST AND LUNG   INSPECTION & AUSCULTATION - normal effort, no stridor    CARDIOVASCULAR  AUSCULTATION & PERIPHERAL VASCULAR - regular rate and rhythm.    NEUROLOGIC  MENTAL STATUS - alert, interactive CRANIAL NERVES - normal    LYMPHATIC  HEAD AND NECK - non-palpable      Procedure(s):  Cerumen removal performed.  See procedure note.    Labs:  WBC   Date Value Ref Range Status   01/25/2022 8.92 3.90 - 12.70 K/uL Final     Hemoglobin   Date Value Ref Range Status   01/25/2022 15.1 14.0 - 18.0 g/dL Final     Platelets   Date Value Ref Range Status   01/25/2022 147 (L) 150 - 450 K/uL Final     Creatinine    Date Value Ref Range Status   01/25/2022 1.0 0.5 - 1.4 mg/dL Final     Glucose   Date Value Ref Range Status   01/25/2022 154 (H) 70 - 110 mg/dL Final         Assessment:      Problem List Items Addressed This Visit    None  Visit Diagnoses       Rhinitis sicca    -  Primary    Hearing loss due to cerumen impaction, left        Otorrhagia of right ear        History of use of hearing aid        Oxygen dependent                     Plan:        Ears cleared.  A little bit of wax still in the left eardrum this should resolve with 3 days of twice daily  Debrox/ Cerumenex without the need for warm water irrigations.  Routine ear care as outlined thereafter with mineral oil at bedtime.      Nasal care with lots of saline and sinus rinses.  Use prescribed mupirocin ointment into the nostrils especially the left side twice daily for a week then switch to Aquaphor for moisturization.  Continue with humidified oxygen at home.  Return if the bleeding is worsening for further evaluation and treatment including possible cauterization which is not indicated today.    Return with any worsening of symptoms, failure to improve, or any other concerns for further evaluation and treatment.

## 2023-03-27 NOTE — PATIENT INSTRUCTIONS
Ears cleared.  A little bit of wax still in the left eardrum this should resolve with 3 days of twice daily  Debrox/ Cerumenex without the need for warm water irrigations.  Routine ear care as outlined thereafter with mineral oil at bedtime.      Nasal care with lots of saline and sinus rinses.  Use prescribed mupirocin ointment into the nostrils especially the left side twice daily for a week then switch to Aquaphor for moisturization.  Continue with humidified oxygen at home.  Return if the bleeding is worsening for further evaluation and treatment including possible cauterization which is not indicated today.    Return with any worsening of symptoms, failure to improve, or any other concerns for further evaluation and treatment.      NASAL SALINE    Still saline comes in many preparations including sprays/mists, gels, and rinses.  Different preparations served different purposes.  Saline spray helps to briefly moisturize the nose and help clear mucus.  Saline gels coat the nose for longer protective benefit of keeping the linings the nose moist.  Saline rinses clear the nose and sinuses and a more thorough way in her best used for significant postnasal drip and sinus complaints.  A combination of saline sprays/mists, gels and rinses should be used to address routine nasal clearing and dryness issues as well as flushing for better control of allergy and postnasal drip symptoms.  There is no real risk of over use of nasal saline products.  Saline sprays do not have any of the potential rebound or addiction of nasal decongestant sprays.  Nasal saline sprays and rinses should be used prior to the application of any medicated nasal sprays such as nasal steroids or nasal antihistamine sprays.      ROUTINE EAR CARE    Keep the ears dry in general.  Water and soap dry the ears increases scaling by stripping away the natural oils of the ears.    A twisted single ply of facial tissue can be used to wick out moisture  on the rare occasion when water gets stuck in the ear; or the water can be displaced with concentrated alcohol like OTC SwimEar or a few drops of 72-95% isopropyl alcohol to fill the ear canal.  Regular use will cause excess drying of the ears.    The ear should be kept moist in general with mineral oil. Three to four drops into the ear canal 2 to 3 times a week or even every night.    If the ears need to be irrigated use either a 50 50 mixture of white vinegar and distilled water or 50 50 mixture of alcohol and white vinegar.    Painful draining ears that do not resolve with conservative care could represent infection and may need microscopic office debridement/clearing of the wax, and topical antibiotic drops with or without steroids may need to be prescribed.

## 2023-04-17 ENCOUNTER — TELEPHONE (OUTPATIENT)
Dept: OTOLARYNGOLOGY | Facility: CLINIC | Age: 88
End: 2023-04-17
Payer: MEDICARE

## 2023-04-17 NOTE — TELEPHONE ENCOUNTER
Called wife back. Advised that unfortunately, we do not have any available appts today. Advised that pt should go to Urgent Care for evaluation. Wife states that they were actually on the way to an urgent care right now. Scheduled follow up with Dr. Bonds for this Thursday afternoon. Thanks, Jess

## 2023-04-17 NOTE — TELEPHONE ENCOUNTER
----- Message from Toshia Ng sent at 4/17/2023  9:43 AM CDT -----  Contact: [phone]  Type:  Same Day Appointment Request    Caller is requesting a same day appointment.  Caller declined first available appointment listed below.      Name of Caller:  Pts wife Hannah   When is the first available appointment? Wed   Symptoms:  Ear pain   Best Call Back Number:  [phone]    Additional Information:   Pls call back and advise. Hannah stated pt is in a lot of pain

## 2023-04-19 ENCOUNTER — TELEPHONE (OUTPATIENT)
Dept: OTOLARYNGOLOGY | Facility: CLINIC | Age: 88
End: 2023-04-19
Payer: MEDICARE

## 2023-04-19 NOTE — TELEPHONE ENCOUNTER
Called wife to see if pt wanted to come in today instead of tomorrow afternoon? Wife states that pt is doing better, but would like to keep appt tomorrow to follow up on his ear. Thanks, Jess

## 2023-04-20 ENCOUNTER — OFFICE VISIT (OUTPATIENT)
Dept: OTOLARYNGOLOGY | Facility: CLINIC | Age: 88
End: 2023-04-20
Payer: MEDICARE

## 2023-04-20 VITALS
WEIGHT: 283.06 LBS | DIASTOLIC BLOOD PRESSURE: 71 MMHG | HEIGHT: 76 IN | BODY MASS INDEX: 34.47 KG/M2 | SYSTOLIC BLOOD PRESSURE: 119 MMHG | HEART RATE: 76 BPM

## 2023-04-20 DIAGNOSIS — H61.22 CERUMINOSIS, LEFT: ICD-10-CM

## 2023-04-20 DIAGNOSIS — H60.392 ACUTE INFECTIVE OTITIS EXTERNA, LEFT: Primary | ICD-10-CM

## 2023-04-20 DIAGNOSIS — H60.8X3 CHRONIC ECZEMATOID OTITIS EXTERNA OF BOTH EARS: ICD-10-CM

## 2023-04-20 PROCEDURE — 1126F PR PAIN SEVERITY QUANTIFIED, NO PAIN PRESENT: ICD-10-PCS | Mod: CPTII,S$GLB,, | Performed by: OTOLARYNGOLOGY

## 2023-04-20 PROCEDURE — 99214 PR OFFICE/OUTPT VISIT, EST, LEVL IV, 30-39 MIN: ICD-10-PCS | Mod: 25,S$GLB,, | Performed by: OTOLARYNGOLOGY

## 2023-04-20 PROCEDURE — 1160F PR REVIEW ALL MEDS BY PRESCRIBER/CLIN PHARMACIST DOCUMENTED: ICD-10-PCS | Mod: CPTII,S$GLB,, | Performed by: OTOLARYNGOLOGY

## 2023-04-20 PROCEDURE — 1126F AMNT PAIN NOTED NONE PRSNT: CPT | Mod: CPTII,S$GLB,, | Performed by: OTOLARYNGOLOGY

## 2023-04-20 PROCEDURE — 1159F PR MEDICATION LIST DOCUMENTED IN MEDICAL RECORD: ICD-10-PCS | Mod: CPTII,S$GLB,, | Performed by: OTOLARYNGOLOGY

## 2023-04-20 PROCEDURE — 1159F MED LIST DOCD IN RCRD: CPT | Mod: CPTII,S$GLB,, | Performed by: OTOLARYNGOLOGY

## 2023-04-20 PROCEDURE — 99214 OFFICE O/P EST MOD 30 MIN: CPT | Mod: 25,S$GLB,, | Performed by: OTOLARYNGOLOGY

## 2023-04-20 PROCEDURE — 99999 PR PBB SHADOW E&M-EST. PATIENT-LVL III: CPT | Mod: PBBFAC,,, | Performed by: OTOLARYNGOLOGY

## 2023-04-20 PROCEDURE — 1160F RVW MEDS BY RX/DR IN RCRD: CPT | Mod: CPTII,S$GLB,, | Performed by: OTOLARYNGOLOGY

## 2023-04-20 PROCEDURE — 99999 PR PBB SHADOW E&M-EST. PATIENT-LVL III: ICD-10-PCS | Mod: PBBFAC,,, | Performed by: OTOLARYNGOLOGY

## 2023-04-20 RX ORDER — FLUOCINOLONE ACETONIDE 0.11 MG/ML
OIL AURICULAR (OTIC)
Qty: 20 ML | Refills: 0 | Status: SHIPPED | OUTPATIENT
Start: 2023-04-20

## 2023-04-20 NOTE — PROGRESS NOTES
[facility]    Subjective:      Patient: Manuel Casas Patient PCP: Michaela Perry MD         :  3/23/1933     Sex:  male      MRN:  [id number]          Date of Visit: 2023      Chief Complaint: Otalgia (Pain in left ear and itching started a week ago. Pain was 8 out of 10. Went to urgent care 23 Patient was given antibiotics, ear drops, and a shot Recofen and hydrocortisone. )      2023 established patient visit: Manuel Casas is a 89 y.o. male former smoker with A complex medical history including diabetes, atrial flutter status post pacemaker and congestive heart failure as well as pulmonary fibrosis with hypoxemia and hypothyroidism self-referred for  ear pain and wax seen 3 weeks ago.  Wax cleared other than a small amount of residual left eardrum wax for which Debrox was recommended.  Also notation of significant rhinitis sicca for which some nasal septal ulceration treatment with mupirocin ointment for a week to be followed by regular saline and Aquaphor was recommended.    He returns here today with complaints of left ear pain.  Seen and treated urgent care with Rocephin and steroid injections (in spite of known diabetes) and oral Bactrim as well as neomycin/polymyxin/HC drops all starting Friday of last week.  Resolution of pain.  No drainage.  Pain with significant with redness and swelling of the preauricular area.  Symptoms seem to start with itching and possibly digging in the ear.    2023 initial patient consultation: Manuel Casas is a 89 y.o. male former smoker with A complex medical history including diabetes, atrial flutter status post pacemaker and congestive heart failure as well as pulmonary fibrosis with hypoxemia and hypothyroidism self-referred for  ear discomfort and wax impaction with dysfunction of hearing aids referred by [facility].  History of sudden hearing loss on the right side 8 years ago wearing what sounds like a Bi-cross hearing aid  set up.  Also complains separately about bloody purulent drainage from the nose associated with oxygen for pulmonary fibrosis/hypoxemia.  Bothersome /worsening for the last 2 weeks without associated facial pressure or pain or fever.    Review of Systems     Past Medical History  He has a past medical history of Atrial flutter, Cancer, Congestive heart failure, Coronary artery disease, Diabetes mellitus, type 2, Heart failure, Hyperlipidemia, Pulmonary fibrosis, and Sleep apnea.    Family / Surgical / Social History  His family history includes Diabetes in his mother; Heart disease in his mother; Hypertension in his mother.    Past Surgical History:   Procedure Laterality Date    ADENOIDECTOMY      CARDIAC PACEMAKER PLACEMENT      HERNIA REPAIR      left, umbilical    REPLACEMENT OF IMPLANTABLE CARDIOVERTER-DEFIBRILLATOR (ICD) GENERATOR N/A 2022    Procedure: REPLACEMENT, PULSE GENERATOR, ICD;  Surgeon: Moises Umaña MD;  Location: [facility] CATH/EP LAB;  Service: Cardiology;  Laterality: N/A;    TONSILLECTOMY         Social History     Tobacco Use    Smoking status: Former     Types: Cigarettes     Quit date: 1970     Years since quittin.3    Smokeless tobacco: Never   Substance and Sexual Activity    Alcohol use: Not Currently     Comment: none for 6 months, occasional red wine    Drug use: Never    Sexual activity: Not Currently       Medications  He has a current medication list which includes the following prescription(s): accu-chek smartview test strip, bicalutamide, cetirizine, cholecalciferol (vitamin d3), clotrimazole, eliquis, fluoxetine, furosemide, levothyroxine, lorazepam, novolin n nph u-100 insulin, ondansetron, pirfenidone, potassium chloride, rosuvastatin, tramadol, ubidecarenone, and ultra comfort insulin syringe.      Allergies  Review of patient's allergies indicates:  No Known Allergies    All medications, allergies, and past history have been reviewed.    Objective:      Vitals:  Vitals  - 1 value per visit 3/27/2023 4/20/2023 4/20/2023   SYSTOLIC 127 - 119   DIASTOLIC 74 - 71   Pulse 76 - 76   Temp - - -   Resp - - -   SPO2 - - -   Weight (lb) 283.07 - 283.07   Weight (kg) 128.4 - 128.4   Height 76 - 76   BMI (Calculated) 34.5 - 34.5   VISIT REPORT - - -   Pain Score  - 0 -       Body surface area is 2.62 meters squared.    Physical Exam:    GENERAL  APPEARANCE -  alert, appears stated age, cooperative, and mildly obese  BARRIER(S) TO COMMUNICATION -  hearing loss VOICE - appropriate for age and gender    INTEGUMENTARY  no suspicious head and neck lesions    HEENT  HEAD: Normocephalic, without obvious abnormality, atraumatic  FACE: INSPECTION - Symmetric, no signs of trauma, no suspicious lesion(s)  PALPATION -  No masses SALIVARY GLANDS - non-tender with no appreciable mass  STRENGTH - facial symmetry  NECK/THYROID: normal atraumatic, no neck masses, normal thyroid, no jvd    EYES  Normal occular alignment and mobility with no visible nystagmus at rest    EARS/NOSE/MOUTH/THROAT  EARS  PINNAE AND EXTERNAL EARS - no suspicious lesion OTOSCOPIC EXAM (surgical microscopy was used for visualization/instrumentation): EAR EXAM -   right ear normal/healthy minimal dry wax removed with alligator forceps, left ear patent no edema heavy wet squamous debris all cleared with little to no touch suction only including off of the tympanic membrane.  No gross fungus, granulation, ulceration.  HEARING -   functional    CHEST AND LUNG   INSPECTION & AUSCULTATION - normal effort, no stridor    NEUROLOGIC  MENTAL STATUS - alert, interactive CRANIAL NERVES - normal        Procedure(s):  Cerumen removal performed.  See procedure note.    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  A little bit of wax still in the left eardrum this should resolve with 3 days of twice daily  Debrox/ Cerumenex without the need for warm water irrigations.  Routine ear care as outlined thereafter with mineral oil at bedtime.      Nasal care with lots of saline and sinus rinses.  Use prescribed mupirocin ointment into the nostrils especially the left side twice daily for a week then switch to Aquaphor for moisturization.  Continue with humidified oxygen at home.  Return if the bleeding is worsening for further evaluation and treatment including possible cauterization which is not indicated today.    Return with any worsening of symptoms, failure to improve, or any other concerns for further evaluation and treatment.

## 2023-04-20 NOTE — PATIENT INSTRUCTIONS
The neomycin/polymyxin/HC drops for 2 more doses.  Finish the last doses of Bactrim for full 1 week course.      Use derm otic oil as needed for control of itching as outlined and routine mineral oil for more routine maintenance of the ears to help control itching and scaling as per written instructions.      Return with any worsening of symptoms, failure to improve, or any other concerns for further evaluation and treatment.    DERMOTIC/FLUOCINOLONE OIL    Use prescribed fluocinolone/derm otic oil to both ears smearing around the outer ear scaling areas as well as down in the ear canal twice daily for a week no more than 2. At this point follow a routine ear care as outlined.  If not improved with this dose of steroid a higher concentration steroid may prove necessary.    ROUTINE EAR CARE    Keep the ears dry in general.  Water and soap dry the ears increases scaling by stripping away the natural oils of the ears.    A twisted single ply of facial tissue can be used to wick out moisture on the rare occasion when water gets stuck in the ear; or the water can be displaced with concentrated alcohol like OTC SwimEar or a few drops of 72-95% isopropyl alcohol to fill the ear canal.  Regular use will cause excess drying of the ears.    The ear should be kept moist in general with mineral oil. Three to four drops into the ear canal 2 to 3 times a week or even every night.    If the ears need to be irrigated use either a 50 50 mixture of white vinegar and distilled water or 50 50 mixture of alcohol and white vinegar.    Painful draining ears that do not resolve with conservative care could represent infection and may need microscopic office debridement/clearing of the wax, and topical antibiotic drops with or without steroids may need to be prescribed.

## 2023-04-20 NOTE — PROCEDURES
"Ear Cerumen Removal    Date/Time: 4/20/2023 3:40 PM  Performed by: Zaki Bonds MD  Authorized by: Zaki Bonds MD     Time out: Immediately prior to procedure a "time out" was called to verify the correct patient, procedure, equipment, support staff and site/side marked as required.    Consent Done?:  Yes (Verbal)    Local anesthetic:  None  Location details:  Left ear  Cerumen  Removal Results:  Cerumen completely removed  Patient tolerance:  Patient tolerated the procedure well with no immediate complications     Suction only.  See note for details.   "

## 2023-05-15 ENCOUNTER — TELEPHONE (OUTPATIENT)
Dept: PULMONOLOGY | Facility: CLINIC | Age: 88
End: 2023-05-15

## 2023-05-15 NOTE — TELEPHONE ENCOUNTER
Patient wife called an states that she needs to talk with you on the phone or do a virtual visit this week about 's condition . She has a lot of questions about medications, oxygen , etc she is waiting to speak with you before a different dr proceeds with treatment . He is in a wheelchair an cant come in the office for a visit

## 2023-08-08 ENCOUNTER — TELEPHONE (OUTPATIENT)
Dept: PULMONOLOGY | Facility: CLINIC | Age: 88
End: 2023-08-08

## 2023-08-08 NOTE — TELEPHONE ENCOUNTER
----- Message from Eula Sales MA sent at 8/7/2023  1:40 PM CDT -----  Wife called wants to reschedule his appointment on august 15th an wants to talk about different family member getting a appointment . Please call her an see what else she needs . Thank you '    Wife [phone]

## 2023-09-05 DIAGNOSIS — J84.10 PULMONARY FIBROSIS: ICD-10-CM

## 2023-09-05 RX ORDER — PIRFENIDONE 801 MG/1
TABLET, FILM COATED ORAL
Qty: 90 TABLET | Refills: 5 | Status: SHIPPED | OUTPATIENT
Start: 2023-09-05

## (undated) DEVICE — SHEATH PINNACLE 6FRX10CM W/GUIDEWIRE

## (undated) DEVICE — SHEATH INTRODUCER PRECISION ACCESS 6FX10

## (undated) DEVICE — SUT 3/0 18IN COATED VICRYLP

## (undated) DEVICE — HEMOSTAT FLOWABLE DRY 4000

## (undated) DEVICE — SUT CTD VICRYL 2-0 UND BR P

## (undated) DEVICE — CATHETER BIPOLAR PACING 5FR J-TIP

## (undated) DEVICE — SUT VICRYL PLUS 4-0 P3 18IN

## (undated) DEVICE — ELECTROCAUTERY PHOTONBLADE